# Patient Record
Sex: MALE | Race: OTHER | HISPANIC OR LATINO
[De-identification: names, ages, dates, MRNs, and addresses within clinical notes are randomized per-mention and may not be internally consistent; named-entity substitution may affect disease eponyms.]

---

## 2019-01-23 ENCOUNTER — TRANSCRIPTION ENCOUNTER (OUTPATIENT)
Age: 54
End: 2019-01-23

## 2019-06-26 ENCOUNTER — TRANSCRIPTION ENCOUNTER (OUTPATIENT)
Age: 54
End: 2019-06-26

## 2019-07-04 ENCOUNTER — TRANSCRIPTION ENCOUNTER (OUTPATIENT)
Age: 54
End: 2019-07-04

## 2019-07-22 ENCOUNTER — TRANSCRIPTION ENCOUNTER (OUTPATIENT)
Age: 54
End: 2019-07-22

## 2020-03-20 ENCOUNTER — INPATIENT (INPATIENT)
Facility: HOSPITAL | Age: 55
LOS: 4 days | Discharge: HOME | End: 2020-03-25
Attending: INTERNAL MEDICINE | Admitting: INTERNAL MEDICINE
Payer: MEDICAID

## 2020-03-20 VITALS
OXYGEN SATURATION: 100 % | WEIGHT: 130.07 LBS | RESPIRATION RATE: 16 BRPM | DIASTOLIC BLOOD PRESSURE: 87 MMHG | HEART RATE: 103 BPM | SYSTOLIC BLOOD PRESSURE: 122 MMHG | TEMPERATURE: 99 F

## 2020-03-20 LAB
ALBUMIN SERPL ELPH-MCNC: 3.2 G/DL — LOW (ref 3.3–5)
ALBUMIN SERPL ELPH-MCNC: 3.5 G/DL — SIGNIFICANT CHANGE UP (ref 3.3–5)
ALP SERPL-CCNC: 47 U/L — SIGNIFICANT CHANGE UP (ref 40–120)
ALP SERPL-CCNC: 51 U/L — SIGNIFICANT CHANGE UP (ref 40–120)
ALT FLD-CCNC: 68 U/L — HIGH (ref 4–41)
ALT FLD-CCNC: 76 U/L — HIGH (ref 4–41)
ANION GAP SERPL CALC-SCNC: 10 MMO/L — SIGNIFICANT CHANGE UP (ref 7–14)
ANION GAP SERPL CALC-SCNC: 11 MMO/L — SIGNIFICANT CHANGE UP (ref 7–14)
APPEARANCE UR: CLEAR — SIGNIFICANT CHANGE UP
APTT BLD: 25.9 SEC — LOW (ref 27.5–36.3)
AST SERPL-CCNC: 250 U/L — HIGH (ref 4–40)
AST SERPL-CCNC: 297 U/L — HIGH (ref 4–40)
B PERT DNA SPEC QL NAA+PROBE: NOT DETECTED — SIGNIFICANT CHANGE UP
BACTERIA # UR AUTO: NEGATIVE — SIGNIFICANT CHANGE UP
BASE EXCESS BLDV CALC-SCNC: 3.8 MMOL/L — SIGNIFICANT CHANGE UP
BASOPHILS # BLD AUTO: 0.01 K/UL — SIGNIFICANT CHANGE UP (ref 0–0.2)
BASOPHILS NFR BLD AUTO: 0.2 % — SIGNIFICANT CHANGE UP (ref 0–2)
BILIRUB SERPL-MCNC: 0.2 MG/DL — SIGNIFICANT CHANGE UP (ref 0.2–1.2)
BILIRUB SERPL-MCNC: 0.3 MG/DL — SIGNIFICANT CHANGE UP (ref 0.2–1.2)
BILIRUB UR-MCNC: NEGATIVE — SIGNIFICANT CHANGE UP
BLOOD GAS VENOUS - CREATININE: 0.88 MG/DL — SIGNIFICANT CHANGE UP (ref 0.5–1.3)
BLOOD GAS VENOUS - FIO2: 21 — SIGNIFICANT CHANGE UP
BLOOD UR QL VISUAL: NEGATIVE — SIGNIFICANT CHANGE UP
BUN SERPL-MCNC: 8 MG/DL — SIGNIFICANT CHANGE UP (ref 7–23)
BUN SERPL-MCNC: 9 MG/DL — SIGNIFICANT CHANGE UP (ref 7–23)
C PNEUM DNA SPEC QL NAA+PROBE: NOT DETECTED — SIGNIFICANT CHANGE UP
CALCIUM SERPL-MCNC: 8 MG/DL — LOW (ref 8.4–10.5)
CALCIUM SERPL-MCNC: 8.6 MG/DL — SIGNIFICANT CHANGE UP (ref 8.4–10.5)
CHLORIDE BLDV-SCNC: 103 MMOL/L — SIGNIFICANT CHANGE UP (ref 96–108)
CHLORIDE SERPL-SCNC: 102 MMOL/L — SIGNIFICANT CHANGE UP (ref 98–107)
CHLORIDE SERPL-SCNC: 98 MMOL/L — SIGNIFICANT CHANGE UP (ref 98–107)
CO2 SERPL-SCNC: 23 MMOL/L — SIGNIFICANT CHANGE UP (ref 22–31)
CO2 SERPL-SCNC: 27 MMOL/L — SIGNIFICANT CHANGE UP (ref 22–31)
COLOR SPEC: SIGNIFICANT CHANGE UP
CREAT SERPL-MCNC: 0.71 MG/DL — SIGNIFICANT CHANGE UP (ref 0.5–1.3)
CREAT SERPL-MCNC: 0.72 MG/DL — SIGNIFICANT CHANGE UP (ref 0.5–1.3)
EOSINOPHIL # BLD AUTO: 0 K/UL — SIGNIFICANT CHANGE UP (ref 0–0.5)
EOSINOPHIL NFR BLD AUTO: 0 % — SIGNIFICANT CHANGE UP (ref 0–6)
FLUAV H1 2009 PAND RNA SPEC QL NAA+PROBE: NOT DETECTED — SIGNIFICANT CHANGE UP
FLUAV H1 RNA SPEC QL NAA+PROBE: NOT DETECTED — SIGNIFICANT CHANGE UP
FLUAV H3 RNA SPEC QL NAA+PROBE: NOT DETECTED — SIGNIFICANT CHANGE UP
FLUAV SUBTYP SPEC NAA+PROBE: NOT DETECTED — SIGNIFICANT CHANGE UP
FLUBV RNA SPEC QL NAA+PROBE: NOT DETECTED — SIGNIFICANT CHANGE UP
GAS PNL BLDV: 136 MMOL/L — SIGNIFICANT CHANGE UP (ref 136–146)
GLUCOSE BLDV-MCNC: 100 MG/DL — HIGH (ref 70–99)
GLUCOSE SERPL-MCNC: 119 MG/DL — HIGH (ref 70–99)
GLUCOSE SERPL-MCNC: 84 MG/DL — SIGNIFICANT CHANGE UP (ref 70–99)
GLUCOSE UR-MCNC: NEGATIVE — SIGNIFICANT CHANGE UP
HADV DNA SPEC QL NAA+PROBE: NOT DETECTED — SIGNIFICANT CHANGE UP
HCO3 BLDV-SCNC: 27 MMOL/L — SIGNIFICANT CHANGE UP (ref 20–27)
HCOV PNL SPEC NAA+PROBE: SIGNIFICANT CHANGE UP
HCT VFR BLD CALC: 41.3 % — SIGNIFICANT CHANGE UP (ref 39–50)
HCT VFR BLDV CALC: 41.8 % — SIGNIFICANT CHANGE UP (ref 39–51)
HGB BLD-MCNC: 14 G/DL — SIGNIFICANT CHANGE UP (ref 13–17)
HGB BLDV-MCNC: 13.6 G/DL — SIGNIFICANT CHANGE UP (ref 13–17)
HMPV RNA SPEC QL NAA+PROBE: NOT DETECTED — SIGNIFICANT CHANGE UP
HPIV1 RNA SPEC QL NAA+PROBE: NOT DETECTED — SIGNIFICANT CHANGE UP
HPIV2 RNA SPEC QL NAA+PROBE: NOT DETECTED — SIGNIFICANT CHANGE UP
HPIV3 RNA SPEC QL NAA+PROBE: NOT DETECTED — SIGNIFICANT CHANGE UP
HPIV4 RNA SPEC QL NAA+PROBE: NOT DETECTED — SIGNIFICANT CHANGE UP
HYALINE CASTS # UR AUTO: NEGATIVE — SIGNIFICANT CHANGE UP
IMM GRANULOCYTES NFR BLD AUTO: 0.2 % — SIGNIFICANT CHANGE UP (ref 0–1.5)
INR BLD: 0.99 — SIGNIFICANT CHANGE UP (ref 0.88–1.17)
KETONES UR-MCNC: HIGH
LACTATE BLDV-MCNC: 1.1 MMOL/L — SIGNIFICANT CHANGE UP (ref 0.5–2)
LEUKOCYTE ESTERASE UR-ACNC: NEGATIVE — SIGNIFICANT CHANGE UP
LYMPHOCYTES # BLD AUTO: 0.98 K/UL — LOW (ref 1–3.3)
LYMPHOCYTES # BLD AUTO: 22.1 % — SIGNIFICANT CHANGE UP (ref 13–44)
MANUAL SMEAR VERIFICATION: SIGNIFICANT CHANGE UP
MCHC RBC-ENTMCNC: 30.4 PG — SIGNIFICANT CHANGE UP (ref 27–34)
MCHC RBC-ENTMCNC: 33.9 % — SIGNIFICANT CHANGE UP (ref 32–36)
MCV RBC AUTO: 89.8 FL — SIGNIFICANT CHANGE UP (ref 80–100)
MONOCYTES # BLD AUTO: 0.23 K/UL — SIGNIFICANT CHANGE UP (ref 0–0.9)
MONOCYTES NFR BLD AUTO: 5.2 % — SIGNIFICANT CHANGE UP (ref 2–14)
MORPHOLOGY BLD-IMP: SIGNIFICANT CHANGE UP
NEUTROPHILS # BLD AUTO: 3.21 K/UL — SIGNIFICANT CHANGE UP (ref 1.8–7.4)
NEUTROPHILS NFR BLD AUTO: 72.3 % — SIGNIFICANT CHANGE UP (ref 43–77)
NITRITE UR-MCNC: NEGATIVE — SIGNIFICANT CHANGE UP
NRBC # FLD: 0 K/UL — SIGNIFICANT CHANGE UP (ref 0–0)
PCO2 BLDV: 47 MMHG — SIGNIFICANT CHANGE UP (ref 41–51)
PH BLDV: 7.4 PH — SIGNIFICANT CHANGE UP (ref 7.32–7.43)
PH UR: 7 — SIGNIFICANT CHANGE UP (ref 5–8)
PLATELET # BLD AUTO: 75 K/UL — LOW (ref 150–400)
PLATELET COUNT - ESTIMATE: SIGNIFICANT CHANGE UP
PMV BLD: 11.8 FL — SIGNIFICANT CHANGE UP (ref 7–13)
PO2 BLDV: 37 MMHG — SIGNIFICANT CHANGE UP (ref 35–40)
POTASSIUM BLDV-SCNC: 3.7 MMOL/L — SIGNIFICANT CHANGE UP (ref 3.4–4.5)
POTASSIUM SERPL-MCNC: 3.7 MMOL/L — SIGNIFICANT CHANGE UP (ref 3.5–5.3)
POTASSIUM SERPL-MCNC: 4.6 MMOL/L — SIGNIFICANT CHANGE UP (ref 3.5–5.3)
POTASSIUM SERPL-SCNC: 3.7 MMOL/L — SIGNIFICANT CHANGE UP (ref 3.5–5.3)
POTASSIUM SERPL-SCNC: 4.6 MMOL/L — SIGNIFICANT CHANGE UP (ref 3.5–5.3)
PROT SERPL-MCNC: 6.3 G/DL — SIGNIFICANT CHANGE UP (ref 6–8.3)
PROT SERPL-MCNC: 6.8 G/DL — SIGNIFICANT CHANGE UP (ref 6–8.3)
PROT UR-MCNC: 20 — SIGNIFICANT CHANGE UP
PROTHROM AB SERPL-ACNC: 11.4 SEC — SIGNIFICANT CHANGE UP (ref 9.8–13.1)
RBC # BLD: 4.6 M/UL — SIGNIFICANT CHANGE UP (ref 4.2–5.8)
RBC # FLD: 12.4 % — SIGNIFICANT CHANGE UP (ref 10.3–14.5)
RBC CASTS # UR COMP ASSIST: SIGNIFICANT CHANGE UP (ref 0–?)
RSV RNA SPEC QL NAA+PROBE: NOT DETECTED — SIGNIFICANT CHANGE UP
RV+EV RNA SPEC QL NAA+PROBE: NOT DETECTED — SIGNIFICANT CHANGE UP
SAO2 % BLDV: 67.2 % — SIGNIFICANT CHANGE UP (ref 60–85)
SODIUM SERPL-SCNC: 135 MMOL/L — SIGNIFICANT CHANGE UP (ref 135–145)
SODIUM SERPL-SCNC: 136 MMOL/L — SIGNIFICANT CHANGE UP (ref 135–145)
SP GR SPEC: 1.01 — SIGNIFICANT CHANGE UP (ref 1–1.04)
SQUAMOUS # UR AUTO: SIGNIFICANT CHANGE UP
UROBILINOGEN FLD QL: NORMAL — SIGNIFICANT CHANGE UP
WBC # BLD: 4.44 K/UL — SIGNIFICANT CHANGE UP (ref 3.8–10.5)
WBC # FLD AUTO: 4.44 K/UL — SIGNIFICANT CHANGE UP (ref 3.8–10.5)
WBC UR QL: SIGNIFICANT CHANGE UP (ref 0–?)

## 2020-03-20 PROCEDURE — 71045 X-RAY EXAM CHEST 1 VIEW: CPT | Mod: 26

## 2020-03-20 RX ORDER — SODIUM CHLORIDE 9 MG/ML
1000 INJECTION INTRAMUSCULAR; INTRAVENOUS; SUBCUTANEOUS ONCE
Refills: 0 | Status: COMPLETED | OUTPATIENT
Start: 2020-03-20 | End: 2020-03-20

## 2020-03-20 RX ADMIN — SODIUM CHLORIDE 1000 MILLILITER(S): 9 INJECTION INTRAMUSCULAR; INTRAVENOUS; SUBCUTANEOUS at 19:37

## 2020-03-20 RX ADMIN — SODIUM CHLORIDE 1000 MILLILITER(S): 9 INJECTION INTRAMUSCULAR; INTRAVENOUS; SUBCUTANEOUS at 17:40

## 2020-03-20 NOTE — ED ADULT NURSE NOTE - INTERVENTIONS DEFINITIONS
Room bathroom lighting operational/Provide visual clues: red socks/Review medications for side effects contributing to fall risk

## 2020-03-20 NOTE — ED ADULT TRIAGE NOTE - CHIEF COMPLAINT QUOTE
from [facility] - group home with aide- fever (100) x 4 days- no other complaints. pt nonverbal= baseline

## 2020-03-20 NOTE — ED PROVIDER NOTE - PHYSICAL EXAMINATION
Const: Well-nourished, Well-developed, appearing stated age.  Eyes: no conjunctival injection, and symmetrical lids.  HEENT: Head NCAT, no lesions. Atraumatic external nose and ears + Dry MM  Neck: Symmetric, trachea midline,  CVS: +S1/S2, Peripheral pulses 2+ and equal in all extremities.  RESP: Unlabored respiratory effort. Clear to auscultation bilaterally.  GI: Nontender/Nondistended, No hepatosplenomegaly.  MSK: Normocephalic/Atraumatic, Extremities w/o deformity or ttp.   Skin: Warm, dry and intact. No rashes or lesions.  Psych:  Appropriate mood and affect.

## 2020-03-20 NOTE — ED PROVIDER NOTE - ATTENDING CONTRIBUTION TO CARE
54M, MR (non verbal at baseline) p/w fevers x5 days. Pper nursing home nurse, Tm 102 and has been getting tylenol daily for fevers. No reported cough, abd pain, vomiting/diarrhea. No foul smelling urine or urinary frequency. Pt has been at baseline per staff though did note decreased appetite x2 days. No known sick contacts in nursing home. Pt has been isolated from other members due to fevers.  VS reviewed - tachycardic and afebrile here (last dose tylenol last night)  Gen: nad, grunting which is baseline per nursing home staff  CV: tachy, regular  Pulm: clear lungs  Abd: soft, nt, nd  Ext: no edema, no rash  MDM: 54M, MR (non verbal at baseline) p/w fevers x5 days without any other symptoms- unclear source of fevers at time time, as such will do infectious workup including labs, blood and urine cultures, UA, RVP and covid given residence in group home, CXR. Dispo pending w/u

## 2020-03-20 NOTE — ED PROVIDER NOTE - OBJECTIVE STATEMENT
54M, MR (non verbal at baseline) no pmhx presenting with CC of fevers x 5days (per nursing home nurse). No complaints of fowl urine, nasal congestion, cough, diarrhea. Other than the fevers, patient has been at his baseline. Fevers have mostly been in the evening with Tmax of 102F, given tylenol. Last dose was yesterday evening. No known sick contacts in nursing home.     [phone], Nurse Essie

## 2020-03-20 NOTE — ED CLERICAL - NS ED CLERK NOTE PRE-ARRIVAL INFORMATION; ADDITIONAL PRE-ARRIVAL INFORMATION
Pt being sent in for fever x 4 days sent in for R/O aspiration PNA pt has difficulty swallowing as per RN.

## 2020-03-20 NOTE — ED ADULT NURSE REASSESSMENT NOTE - NS ED NURSE REASSESS COMMENT FT1
pt straight cathed for urine using sterile technique. Pt noted to have 700 cc of dark yellow urine noted. repeat labs drawn and sent. pt resting comfortably in bed. NAD. awaiting further orders will continue to monitor.

## 2020-03-20 NOTE — ED PROVIDER NOTE - NS ED ROS FT
Obtained from Nurse Essie    CONST: + fevers, + chills, no trauma  ENT: no rhinorrhea  CV: no extremity pain or swelling  RESP: no shortness of breath, no cough, no sputum, no pleurisy, no wheezing  ABD: no abdominal pain, no vomiting, no diarrhea  : no dysuria, no hematuria, no frequency, no urgency  NEURO: no focal weakness  HEME: no easy bleeding or bruising  SKIN: no diaphoresis, no rash

## 2020-03-20 NOTE — ED ADULT NURSE NOTE - CHIEF COMPLAINT QUOTE
from [facility] - group home with aide- fever (100) x 4 days- no other complaints. pt nonverbal= baseline

## 2020-03-20 NOTE — ED PROVIDER NOTE - CLINICAL SUMMARY MEDICAL DECISION MAKING FREE TEXT BOX
54M hx of MR presenting for fevers PE: tachycardic to 103, otherwise unremarkable exam Ddx: Viral illness 2/2 to flu vs COVID vs other Plan: Septic work up, labs, cxr, urine, fluids

## 2020-03-21 DIAGNOSIS — Z02.9 ENCOUNTER FOR ADMINISTRATIVE EXAMINATIONS, UNSPECIFIED: ICD-10-CM

## 2020-03-21 DIAGNOSIS — R62.50 UNSPECIFIED LACK OF EXPECTED NORMAL PHYSIOLOGICAL DEVELOPMENT IN CHILDHOOD: ICD-10-CM

## 2020-03-21 DIAGNOSIS — B34.9 VIRAL INFECTION, UNSPECIFIED: ICD-10-CM

## 2020-03-21 DIAGNOSIS — D69.6 THROMBOCYTOPENIA, UNSPECIFIED: ICD-10-CM

## 2020-03-21 LAB
ALBUMIN SERPL ELPH-MCNC: 3.4 G/DL — SIGNIFICANT CHANGE UP (ref 3.3–5)
ALP SERPL-CCNC: 53 U/L — SIGNIFICANT CHANGE UP (ref 40–120)
ALT FLD-CCNC: 72 U/L — HIGH (ref 4–41)
ANION GAP SERPL CALC-SCNC: 11 MMO/L — SIGNIFICANT CHANGE UP (ref 7–14)
AST SERPL-CCNC: 235 U/L — HIGH (ref 4–40)
BASOPHILS # BLD AUTO: 0 K/UL — SIGNIFICANT CHANGE UP (ref 0–0.2)
BASOPHILS NFR BLD AUTO: 0 % — SIGNIFICANT CHANGE UP (ref 0–2)
BILIRUB SERPL-MCNC: 0.3 MG/DL — SIGNIFICANT CHANGE UP (ref 0.2–1.2)
BUN SERPL-MCNC: 10 MG/DL — SIGNIFICANT CHANGE UP (ref 7–23)
CALCIUM SERPL-MCNC: 8.3 MG/DL — LOW (ref 8.4–10.5)
CHLORIDE SERPL-SCNC: 101 MMOL/L — SIGNIFICANT CHANGE UP (ref 98–107)
CO2 SERPL-SCNC: 24 MMOL/L — SIGNIFICANT CHANGE UP (ref 22–31)
CREAT SERPL-MCNC: 0.79 MG/DL — SIGNIFICANT CHANGE UP (ref 0.5–1.3)
D DIMER BLD IA.RAPID-MCNC: 263 NG/ML — SIGNIFICANT CHANGE UP
EOSINOPHIL # BLD AUTO: 0.01 K/UL — SIGNIFICANT CHANGE UP (ref 0–0.5)
EOSINOPHIL NFR BLD AUTO: 0.2 % — SIGNIFICANT CHANGE UP (ref 0–6)
FIBRINOGEN PPP-MCNC: 553 MG/DL — HIGH (ref 300–520)
GLUCOSE SERPL-MCNC: 75 MG/DL — SIGNIFICANT CHANGE UP (ref 70–99)
HCT VFR BLD CALC: 41.9 % — SIGNIFICANT CHANGE UP (ref 39–50)
HGB BLD-MCNC: 13.9 G/DL — SIGNIFICANT CHANGE UP (ref 13–17)
IMM GRANULOCYTES NFR BLD AUTO: 0.4 % — SIGNIFICANT CHANGE UP (ref 0–1.5)
LYMPHOCYTES # BLD AUTO: 1.19 K/UL — SIGNIFICANT CHANGE UP (ref 1–3.3)
LYMPHOCYTES # BLD AUTO: 22.1 % — SIGNIFICANT CHANGE UP (ref 13–44)
MAGNESIUM SERPL-MCNC: 2.4 MG/DL — SIGNIFICANT CHANGE UP (ref 1.6–2.6)
MCHC RBC-ENTMCNC: 30.3 PG — SIGNIFICANT CHANGE UP (ref 27–34)
MCHC RBC-ENTMCNC: 33.2 % — SIGNIFICANT CHANGE UP (ref 32–36)
MCV RBC AUTO: 91.3 FL — SIGNIFICANT CHANGE UP (ref 80–100)
MONOCYTES # BLD AUTO: 0.25 K/UL — SIGNIFICANT CHANGE UP (ref 0–0.9)
MONOCYTES NFR BLD AUTO: 4.6 % — SIGNIFICANT CHANGE UP (ref 2–14)
NEUTROPHILS # BLD AUTO: 3.92 K/UL — SIGNIFICANT CHANGE UP (ref 1.8–7.4)
NEUTROPHILS NFR BLD AUTO: 72.7 % — SIGNIFICANT CHANGE UP (ref 43–77)
NRBC # FLD: 0 K/UL — SIGNIFICANT CHANGE UP (ref 0–0)
PHOSPHATE SERPL-MCNC: 2.9 MG/DL — SIGNIFICANT CHANGE UP (ref 2.5–4.5)
PLATELET # BLD AUTO: 84 K/UL — LOW (ref 150–400)
PMV BLD: 12.3 FL — SIGNIFICANT CHANGE UP (ref 7–13)
POTASSIUM SERPL-MCNC: 3.8 MMOL/L — SIGNIFICANT CHANGE UP (ref 3.5–5.3)
POTASSIUM SERPL-SCNC: 3.8 MMOL/L — SIGNIFICANT CHANGE UP (ref 3.5–5.3)
PROT SERPL-MCNC: 6.7 G/DL — SIGNIFICANT CHANGE UP (ref 6–8.3)
RBC # BLD: 4.59 M/UL — SIGNIFICANT CHANGE UP (ref 4.2–5.8)
RBC # FLD: 12.8 % — SIGNIFICANT CHANGE UP (ref 10.3–14.5)
SODIUM SERPL-SCNC: 136 MMOL/L — SIGNIFICANT CHANGE UP (ref 135–145)
WBC # BLD: 5.39 K/UL — SIGNIFICANT CHANGE UP (ref 3.8–10.5)
WBC # FLD AUTO: 5.39 K/UL — SIGNIFICANT CHANGE UP (ref 3.8–10.5)

## 2020-03-21 PROCEDURE — 99223 1ST HOSP IP/OBS HIGH 75: CPT

## 2020-03-21 PROCEDURE — 12345: CPT | Mod: NC

## 2020-03-21 RX ORDER — TAMSULOSIN HYDROCHLORIDE 0.4 MG/1
0.4 CAPSULE ORAL AT BEDTIME
Refills: 0 | Status: DISCONTINUED | OUTPATIENT
Start: 2020-03-21 | End: 2020-03-25

## 2020-03-21 RX ORDER — KETOTIFEN FUMARATE 0.34 MG/ML
1 SOLUTION OPHTHALMIC
Refills: 0 | Status: DISCONTINUED | OUTPATIENT
Start: 2020-03-21 | End: 2020-03-25

## 2020-03-21 RX ORDER — ACETAMINOPHEN 500 MG
650 TABLET ORAL EVERY 6 HOURS
Refills: 0 | Status: DISCONTINUED | OUTPATIENT
Start: 2020-03-21 | End: 2020-03-25

## 2020-03-21 RX ORDER — BENZTROPINE MESYLATE 1 MG
0.5 TABLET ORAL
Refills: 0 | Status: DISCONTINUED | OUTPATIENT
Start: 2020-03-21 | End: 2020-03-25

## 2020-03-21 RX ORDER — CHLORPROMAZINE HCL 10 MG
25 TABLET ORAL DAILY
Refills: 0 | Status: DISCONTINUED | OUTPATIENT
Start: 2020-03-21 | End: 2020-03-25

## 2020-03-21 RX ORDER — CHLORPROMAZINE HCL 10 MG
100 TABLET ORAL AT BEDTIME
Refills: 0 | Status: DISCONTINUED | OUTPATIENT
Start: 2020-03-21 | End: 2020-03-25

## 2020-03-21 RX ORDER — POLYETHYLENE GLYCOL 3350 17 G/17G
17 POWDER, FOR SOLUTION ORAL DAILY
Refills: 0 | Status: DISCONTINUED | OUTPATIENT
Start: 2020-03-21 | End: 2020-03-25

## 2020-03-21 RX ADMIN — KETOTIFEN FUMARATE 1 DROP(S): 0.34 SOLUTION OPHTHALMIC at 17:16

## 2020-03-21 RX ADMIN — Medication 25 MILLIGRAM(S): at 11:46

## 2020-03-21 RX ADMIN — Medication 0.5 MILLIGRAM(S): at 07:55

## 2020-03-21 RX ADMIN — Medication 100 MILLIGRAM(S): at 21:51

## 2020-03-21 RX ADMIN — Medication 0.5 MILLIGRAM(S): at 17:16

## 2020-03-21 RX ADMIN — Medication 1 TABLET(S): at 11:46

## 2020-03-21 RX ADMIN — TAMSULOSIN HYDROCHLORIDE 0.4 MILLIGRAM(S): 0.4 CAPSULE ORAL at 21:51

## 2020-03-21 RX ADMIN — KETOTIFEN FUMARATE 1 DROP(S): 0.34 SOLUTION OPHTHALMIC at 11:46

## 2020-03-21 NOTE — PROGRESS NOTE ADULT - PROBLEM SELECTOR PLAN 4
If pt remains clinically stable without O2 requirements, can consider d/c home tomorrow or the day after.

## 2020-03-21 NOTE — H&P ADULT - PROBLEM SELECTOR PLAN 2
- continue chlorpromazine Unclear cause.  No baseline level available  - will recheck in am, and check d dimer and fibrinogen

## 2020-03-21 NOTE — H&P ADULT - NSHPLABSRESULTS_GEN_ALL_CORE
136  |  102  |  8   ----------------------------<  84  3.7   |  23  |  0.71    Ca    8.0<L>      20 Mar 2020 22:28    TPro  6.3  /  Alb  3.2<L>  /  TBili  0.2  /  DBili  x   /  AST  250<H>  /  ALT  68<H>  /  AlkPhos  47                              14.0   4.44  )-----------( 75       ( 20 Mar 2020 16:55 )             41.3             PT/INR - ( 20 Mar 2020 16:55 )   PT: 11.4 SEC;   INR: 0.99          PTT - ( 20 Mar 2020 16:55 )  PTT:25.9 SEC    Urinalysis Basic - ( 20 Mar 2020 22:28 )    Color: LIGHT YELLOW / Appearance: CLEAR / S.012 / pH: 7.0  Gluc: NEGATIVE / Ketone: MODERATE  / Bili: NEGATIVE / Urobili: NORMAL   Blood: NEGATIVE / Protein: 20 / Nitrite: NEGATIVE   Leuk Esterase: NEGATIVE / RBC: 0-2 / WBC 0-2   Sq Epi: OCC / Non Sq Epi: x / Bacteria: NEGATIVE    cxr film reviewed: Clear lungs

## 2020-03-21 NOTE — PROVIDER CONTACT NOTE (OTHER) - ASSESSMENT
pt nonverbal, has MR, A&OX0, incontinent x2. ate breakfast and lunch, able to swallow without difficulty. VS stable, room air, not in any acute distress.

## 2020-03-21 NOTE — PROGRESS NOTE ADULT - SUBJECTIVE AND OBJECTIVE BOX
Patient is a 54y old  Male who presents with a chief complaint of fever (21 Mar 2020 02:01)        SUBJECTIVE / OVERNIGHT EVENTS:      MEDICATIONS  (STANDING):  benztropine 0.5 milliGRAM(s) Oral two times a day  chlorproMAZINE    Tablet 100 milliGRAM(s) Oral at bedtime  chlorproMAZINE    Tablet 25 milliGRAM(s) Oral daily  ketotifen 0.025% Ophthalmic Solution 1 Drop(s) Both EYES two times a day  multivitamin 1 Tablet(s) Oral daily  tamsulosin 0.4 milliGRAM(s) Oral at bedtime    MEDICATIONS  (PRN):  acetaminophen   Tablet .. 650 milliGRAM(s) Oral every 6 hours PRN Temp greater or equal to 38C (100.4F)  polyethylene glycol 3350 17 Gram(s) Oral daily PRN Constipation      Vital Signs Last 24 Hrs  T(C): 36.6 (21 Mar 2020 15:40), Max: 36.9 (21 Mar 2020 11:44)  T(F): 97.9 (21 Mar 2020 15:40), Max: 98.4 (21 Mar 2020 11:44)  HR: 84 (21 Mar 2020 15:40) (84 - 95)  BP: 119/80 (21 Mar 2020 15:40) (111/70 - 119/80)  BP(mean): --  RR: 20 (21 Mar 2020 15:40) (17 - 20)  SpO2: 99% (21 Mar 2020 15:40) (98% - 100%)  CAPILLARY BLOOD GLUCOSE        I&O's Summary        PHYSICAL EXAM  GENERAL: NAD, well-developed  HEAD:  Atraumatic, Normocephalic  EYES: EOMI, PERRLA, conjunctiva and sclera clear  NECK: Supple, No JVD  CHEST/LUNG: Clear to auscultation bilaterally; No wheeze  HEART: Regular rate and rhythm; No murmurs, rubs, or gallops  ABDOMEN: Soft, Nontender, Nondistended; Bowel sounds present  EXTREMITIES:  2+ Peripheral Pulses, No clubbing, cyanosis, or edema  PSYCH: AAOx3  SKIN: No rashes or lesions    LABS:                        13.9   5.39  )-----------( 84       ( 21 Mar 2020 07:05 )             41.9     03-21    136  |  101  |  10  ----------------------------<  75  3.8   |  24  |  0.79    Ca    8.3<L>      21 Mar 2020 07:05  Phos  2.9     -  Mg     2.4         TPro  6.7  /  Alb  3.4  /  TBili  0.3  /  DBili  x   /  AST  235<H>  /  ALT  72<H>  /  AlkPhos  53  03-21    PT/INR - ( 20 Mar 2020 16:55 )   PT: 11.4 SEC;   INR: 0.99          PTT - ( 20 Mar 2020 16:55 )  PTT:25.9 SEC      Urinalysis Basic - ( 20 Mar 2020 22:28 )    Color: LIGHT YELLOW / Appearance: CLEAR / S.012 / pH: 7.0  Gluc: NEGATIVE / Ketone: MODERATE  / Bili: NEGATIVE / Urobili: NORMAL   Blood: NEGATIVE / Protein: 20 / Nitrite: NEGATIVE   Leuk Esterase: NEGATIVE / RBC: 0-2 / WBC 0-2   Sq Epi: OCC / Non Sq Epi: x / Bacteria: NEGATIVE        RADIOLOGY & ADDITIONAL TESTS:    Imaging Personally Reviewed:  Consultant(s) Notes Reviewed:    Care Discussed with Consultants/Other Providers: Patient is a 54y old  Male who presents with a chief complaint of fever (21 Mar 2020 02:01)    SUBJECTIVE / OVERNIGHT EVENTS:  Unable to obtain ROS. Patient non-verbal and not following commands. No active issues as per patient's day nurse.     MEDICATIONS  (STANDING):  benztropine 0.5 milliGRAM(s) Oral two times a day  chlorproMAZINE    Tablet 100 milliGRAM(s) Oral at bedtime  chlorproMAZINE    Tablet 25 milliGRAM(s) Oral daily  ketotifen 0.025% Ophthalmic Solution 1 Drop(s) Both EYES two times a day  multivitamin 1 Tablet(s) Oral daily  tamsulosin 0.4 milliGRAM(s) Oral at bedtime    MEDICATIONS  (PRN):  acetaminophen   Tablet .. 650 milliGRAM(s) Oral every 6 hours PRN Temp greater or equal to 38C (100.4F)  polyethylene glycol 3350 17 Gram(s) Oral daily PRN Constipation      Vital Signs Last 24 Hrs  T(C): 36.6 (21 Mar 2020 15:40), Max: 36.9 (21 Mar 2020 11:44)  T(F): 97.9 (21 Mar 2020 15:40), Max: 98.4 (21 Mar 2020 11:44)  HR: 84 (21 Mar 2020 15:40) (84 - 95)  BP: 119/80 (21 Mar 2020 15:40) (111/70 - 119/80)  BP(mean): --  RR: 20 (21 Mar 2020 15:40) (17 - 20)  SpO2: 99% (21 Mar 2020 15:40) (98% - 100%)        PHYSICAL EXAM  GENERAL: NAD  CHEST/LUNG: Normal respiratory effort, no use of accessory muscles.   HEART: Regular rate and rhythm;  ABDOMEN: Soft, Nontender, Nondistended; Bowel sounds present  EXTREMITIES:  2+ Peripheral Pulses, No clubbing, cyanosis, or edema  PSYCH: AAOx3        LABS:                        13.9   5.39  )-----------( 84       ( 21 Mar 2020 07:05 )             41.9     03-21    136  |  101  |  10  ----------------------------<  75  3.8   |  24  |  0.79    Ca    8.3<L>      21 Mar 2020 07:05  Phos  2.9     03-21  Mg     2.4     03-21    TPro  6.7  /  Alb  3.4  /  TBili  0.3  /  DBili  x   /  AST  235<H>  /  ALT  72<H>  /  AlkPhos  53  03-21

## 2020-03-21 NOTE — H&P ADULT - HISTORY OF PRESENT ILLNESS
53 y/o M with MR (non verbal at baseline) 2M, MR p/w fever for the past 5 days.  Pt had temp as high as 102F today.  Aide at bedside also reports that pt has had cough.  No abdominal pain noted.  No vomiting or diarrhea.  Per aide, no other known sick contacts in group home.      In the ED, pt was tachycardic to 103.  He was given 2L NS. 55 y/o M with MR (non verbal at baseline), autism p/w fever for the past 5 days.  Pt had temp as high as 102F today.  Aide at bedside also reports that pt has had cough.  No abdominal pain noted.  No vomiting or diarrhea.  Per aide, no other known sick contacts in group home.      In the ED, pt was tachycardic to 103.  He was given 2L NS.

## 2020-03-21 NOTE — PROVIDER CONTACT NOTE (OTHER) - ASSESSMENT
pt nonverbal, unable to make his needs known, a&ox0 and MR.  pt voided once throughout shift (incontinent count) and put out 175ml post straight catheterization.

## 2020-03-22 LAB
ANION GAP SERPL CALC-SCNC: 12 MMO/L — SIGNIFICANT CHANGE UP (ref 7–14)
BASOPHILS # BLD AUTO: 0.01 K/UL — SIGNIFICANT CHANGE UP (ref 0–0.2)
BASOPHILS NFR BLD AUTO: 0.3 % — SIGNIFICANT CHANGE UP (ref 0–2)
BUN SERPL-MCNC: 11 MG/DL — SIGNIFICANT CHANGE UP (ref 7–23)
CALCIUM SERPL-MCNC: 8.7 MG/DL — SIGNIFICANT CHANGE UP (ref 8.4–10.5)
CHLORIDE SERPL-SCNC: 102 MMOL/L — SIGNIFICANT CHANGE UP (ref 98–107)
CO2 SERPL-SCNC: 24 MMOL/L — SIGNIFICANT CHANGE UP (ref 22–31)
CREAT SERPL-MCNC: 0.69 MG/DL — SIGNIFICANT CHANGE UP (ref 0.5–1.3)
CULTURE RESULTS: SIGNIFICANT CHANGE UP
EOSINOPHIL # BLD AUTO: 0.02 K/UL — SIGNIFICANT CHANGE UP (ref 0–0.5)
EOSINOPHIL NFR BLD AUTO: 0.5 % — SIGNIFICANT CHANGE UP (ref 0–6)
GLUCOSE SERPL-MCNC: 92 MG/DL — SIGNIFICANT CHANGE UP (ref 70–99)
HCT VFR BLD CALC: 44.8 % — SIGNIFICANT CHANGE UP (ref 39–50)
HGB BLD-MCNC: 15.1 G/DL — SIGNIFICANT CHANGE UP (ref 13–17)
IMM GRANULOCYTES NFR BLD AUTO: 0.3 % — SIGNIFICANT CHANGE UP (ref 0–1.5)
LYMPHOCYTES # BLD AUTO: 1.31 K/UL — SIGNIFICANT CHANGE UP (ref 1–3.3)
LYMPHOCYTES # BLD AUTO: 35.6 % — SIGNIFICANT CHANGE UP (ref 13–44)
MAGNESIUM SERPL-MCNC: 2.3 MG/DL — SIGNIFICANT CHANGE UP (ref 1.6–2.6)
MCHC RBC-ENTMCNC: 30.8 PG — SIGNIFICANT CHANGE UP (ref 27–34)
MCHC RBC-ENTMCNC: 33.7 % — SIGNIFICANT CHANGE UP (ref 32–36)
MCV RBC AUTO: 91.4 FL — SIGNIFICANT CHANGE UP (ref 80–100)
MONOCYTES # BLD AUTO: 0.28 K/UL — SIGNIFICANT CHANGE UP (ref 0–0.9)
MONOCYTES NFR BLD AUTO: 7.6 % — SIGNIFICANT CHANGE UP (ref 2–14)
NEUTROPHILS # BLD AUTO: 2.05 K/UL — SIGNIFICANT CHANGE UP (ref 1.8–7.4)
NEUTROPHILS NFR BLD AUTO: 55.7 % — SIGNIFICANT CHANGE UP (ref 43–77)
NRBC # FLD: 0 K/UL — SIGNIFICANT CHANGE UP (ref 0–0)
PLATELET # BLD AUTO: 97 K/UL — LOW (ref 150–400)
PMV BLD: 11.7 FL — SIGNIFICANT CHANGE UP (ref 7–13)
POTASSIUM SERPL-MCNC: 4 MMOL/L — SIGNIFICANT CHANGE UP (ref 3.5–5.3)
POTASSIUM SERPL-SCNC: 4 MMOL/L — SIGNIFICANT CHANGE UP (ref 3.5–5.3)
RBC # BLD: 4.9 M/UL — SIGNIFICANT CHANGE UP (ref 4.2–5.8)
RBC # FLD: 12.7 % — SIGNIFICANT CHANGE UP (ref 10.3–14.5)
SARS-COV-2 RNA SPEC QL NAA+PROBE: DETECTED
SODIUM SERPL-SCNC: 138 MMOL/L — SIGNIFICANT CHANGE UP (ref 135–145)
SPECIMEN SOURCE: SIGNIFICANT CHANGE UP
WBC # BLD: 3.68 K/UL — LOW (ref 3.8–10.5)
WBC # FLD AUTO: 3.68 K/UL — LOW (ref 3.8–10.5)

## 2020-03-22 PROCEDURE — 99232 SBSQ HOSP IP/OBS MODERATE 35: CPT

## 2020-03-22 RX ADMIN — Medication 25 MILLIGRAM(S): at 12:07

## 2020-03-22 RX ADMIN — KETOTIFEN FUMARATE 1 DROP(S): 0.34 SOLUTION OPHTHALMIC at 06:11

## 2020-03-22 RX ADMIN — TAMSULOSIN HYDROCHLORIDE 0.4 MILLIGRAM(S): 0.4 CAPSULE ORAL at 21:53

## 2020-03-22 RX ADMIN — Medication 0.5 MILLIGRAM(S): at 18:32

## 2020-03-22 RX ADMIN — Medication 0.5 MILLIGRAM(S): at 06:11

## 2020-03-22 RX ADMIN — Medication 1 TABLET(S): at 12:07

## 2020-03-22 RX ADMIN — Medication 100 MILLIGRAM(S): at 21:53

## 2020-03-22 RX ADMIN — KETOTIFEN FUMARATE 1 DROP(S): 0.34 SOLUTION OPHTHALMIC at 18:24

## 2020-03-22 NOTE — PROGRESS NOTE ADULT - SUBJECTIVE AND OBJECTIVE BOX
PROGRESS NOTE:   Authored by Dr. Sugar Quevedo Pager [phone]    Patient is a 54y old  Male who presents with a chief complaint of fever (21 Mar 2020 17:38)      SUBJECTIVE / OVERNIGHT EVENTS: Patient non verbal at baseline however appears to be in good spirits - smiling and shook my hand.     ADDITIONAL REVIEW OF SYSTEMS:  Unable to obtain.    MEDICATIONS  (STANDING):  benztropine 0.5 milliGRAM(s) Oral two times a day  chlorproMAZINE    Tablet 100 milliGRAM(s) Oral at bedtime  chlorproMAZINE    Tablet 25 milliGRAM(s) Oral daily  ketotifen 0.025% Ophthalmic Solution 1 Drop(s) Both EYES two times a day  multivitamin 1 Tablet(s) Oral daily  tamsulosin 0.4 milliGRAM(s) Oral at bedtime    MEDICATIONS  (PRN):  acetaminophen   Tablet .. 650 milliGRAM(s) Oral every 6 hours PRN Temp greater or equal to 38C (100.4F)  polyethylene glycol 3350 17 Gram(s) Oral daily PRN Constipation      CAPILLARY BLOOD GLUCOSE        I&O's Summary    21 Mar 2020 07:01  -  22 Mar 2020 07:00  --------------------------------------------------------  IN: 0 mL / OUT: 175 mL / NET: -175 mL        PHYSICAL EXAM:  Vital Signs Last 24 Hrs  T(C): 36.7 (22 Mar 2020 12:03), Max: 36.8 (21 Mar 2020 19:12)  T(F): 98 (22 Mar 2020 12:03), Max: 98.2 (21 Mar 2020 19:12)  HR: 82 (22 Mar 2020 12:03) (80 - 90)  BP: 100/68 (22 Mar 2020 12:03) (100/61 - 119/80)  BP(mean): --  RR: 18 (22 Mar 2020 12:03) (17 - 20)  SpO2: 100% (22 Mar 2020 12:03) (98% - 100%)    CONSTITUTIONAL: NAD, well-developed  RESPIRATORY: Normal respiratory effort; lungs are clear to auscultation bilaterally  CARDIOVASCULAR: Regular rate and rhythm, normal S1 and S2, no murmur/rub/gallop; No lower extremity edema; Peripheral pulses are 2+ bilaterally  ABDOMEN: Nontender to palpation, normoactive bowel sounds, no rebound/guarding; No hepatosplenomegaly  MUSCLOSKELETAL: no clubbing or cyanosis of digits; no joint swelling or tenderness to palpation  PSYCH: unable to assess orientation.    LABS:                        15.1   3.68  )-----------( 97       ( 22 Mar 2020 06:05 )             44.8     03-22    138  |  102  |  11  ----------------------------<  92  4.0   |  24  |  0.69    Ca    8.7      22 Mar 2020 06:05  Phos  2.9     -21  Mg     2.3     22    TPro  6.7  /  Alb  3.4  /  TBili  0.3  /  DBili  x   /  AST  235<H>  /  ALT  72<H>  /  AlkPhos  53  03-21    PT/INR - ( 20 Mar 2020 16:55 )   PT: 11.4 SEC;   INR: 0.99          PTT - ( 20 Mar 2020 16:55 )  PTT:25.9 SEC      Urinalysis Basic - ( 20 Mar 2020 22:28 )    Color: LIGHT YELLOW / Appearance: CLEAR / S.012 / pH: 7.0  Gluc: NEGATIVE / Ketone: MODERATE  / Bili: NEGATIVE / Urobili: NORMAL   Blood: NEGATIVE / Protein: 20 / Nitrite: NEGATIVE   Leuk Esterase: NEGATIVE / RBC: 0-2 / WBC 0-2   Sq Epi: OCC / Non Sq Epi: x / Bacteria: NEGATIVE        Culture - Urine (collected 21 Mar 2020 06:14)  Source: .Urine Clean Catch (Midstream)  Final Report (22 Mar 2020 08:19):    <10,000 CFU/mL Normal Urogenital Yael    Culture - Blood (collected 20 Mar 2020 21:05)  Source: .Blood Blood-Peripheral  Preliminary Report (21 Mar 2020 22:01):    No growth to date.    Culture - Blood (collected 20 Mar 2020 21:05)  Source: .Blood Blood-Peripheral  Preliminary Report (21 Mar 2020 22:01):    No growth to date.        RADIOLOGY & ADDITIONAL TESTS:  Results Reviewed:   Imaging Personally Reviewed:  Electrocardiogram Personally Reviewed:    COORDINATION OF CARE:  Care Discussed with Consultants/Other Providers [Y/N]:  Prior or Outpatient Records Reviewed [Y/N]:

## 2020-03-22 NOTE — PROGRESS NOTE ADULT - PROBLEM SELECTOR PLAN 4
-clinically stable for discharge however group home is not sure if they can receive him. I spoke with AMADOR Fountain and explained his positive result to him. She is going to speak with her medical director and after they speak will reach out to me to speak with the medical director as well. If this does not occur today then will call tomorrow.

## 2020-03-22 NOTE — CHART NOTE - NSCHARTNOTEFT_GEN_A_CORE
I spoke with  AMADOR Fountain at [phone] from Group Home again who gave me Medical Director's phone number: [phone] to call tomorrow as the Group Home will prepare for Mr. Olivera's discharge.     She also reviewed his labs and confirmed thrombocytopenia is chronic however in 100-200 range and was being followed by his PMD and hematology however hematology recently signed off. She is not sure if labs can be checked in the group home  next week to ensure they are improving but will look into this.    Sugar Quevedo MD  Internal Medicine  Pager [phone] | [phone]

## 2020-03-23 DIAGNOSIS — B97.21 SARS-ASSOCIATED CORONAVIRUS AS THE CAUSE OF DISEASES CLASSIFIED ELSEWHERE: ICD-10-CM

## 2020-03-23 LAB
ALBUMIN SERPL ELPH-MCNC: 3.5 G/DL — SIGNIFICANT CHANGE UP (ref 3.3–5)
ALP SERPL-CCNC: 150 U/L — HIGH (ref 40–120)
ALT FLD-CCNC: 36 U/L — SIGNIFICANT CHANGE UP (ref 4–41)
ANION GAP SERPL CALC-SCNC: 12 MMO/L — SIGNIFICANT CHANGE UP (ref 7–14)
AST SERPL-CCNC: 42 U/L — HIGH (ref 4–40)
BILIRUB DIRECT SERPL-MCNC: < 0.2 MG/DL — SIGNIFICANT CHANGE UP (ref 0.1–0.2)
BILIRUB SERPL-MCNC: 0.3 MG/DL — SIGNIFICANT CHANGE UP (ref 0.2–1.2)
BUN SERPL-MCNC: 11 MG/DL — SIGNIFICANT CHANGE UP (ref 7–23)
CALCIUM SERPL-MCNC: 8.9 MG/DL — SIGNIFICANT CHANGE UP (ref 8.4–10.5)
CHLORIDE SERPL-SCNC: 103 MMOL/L — SIGNIFICANT CHANGE UP (ref 98–107)
CO2 SERPL-SCNC: 20 MMOL/L — LOW (ref 22–31)
CREAT SERPL-MCNC: 0.61 MG/DL — SIGNIFICANT CHANGE UP (ref 0.5–1.3)
GLUCOSE SERPL-MCNC: 93 MG/DL — SIGNIFICANT CHANGE UP (ref 70–99)
HCT VFR BLD CALC: 33.9 % — LOW (ref 39–50)
HCT VFR BLD CALC: 42.2 % — SIGNIFICANT CHANGE UP (ref 39–50)
HGB BLD-MCNC: 11 G/DL — LOW (ref 13–17)
HGB BLD-MCNC: 14.2 G/DL — SIGNIFICANT CHANGE UP (ref 13–17)
MAGNESIUM SERPL-MCNC: 2 MG/DL — SIGNIFICANT CHANGE UP (ref 1.6–2.6)
MCHC RBC-ENTMCNC: 25.9 PG — LOW (ref 27–34)
MCHC RBC-ENTMCNC: 30.3 PG — SIGNIFICANT CHANGE UP (ref 27–34)
MCHC RBC-ENTMCNC: 32.4 % — SIGNIFICANT CHANGE UP (ref 32–36)
MCHC RBC-ENTMCNC: 33.6 % — SIGNIFICANT CHANGE UP (ref 32–36)
MCV RBC AUTO: 80 FL — SIGNIFICANT CHANGE UP (ref 80–100)
MCV RBC AUTO: 90.2 FL — SIGNIFICANT CHANGE UP (ref 80–100)
NRBC # FLD: 0 K/UL — SIGNIFICANT CHANGE UP (ref 0–0)
NRBC # FLD: 0 K/UL — SIGNIFICANT CHANGE UP (ref 0–0)
PLATELET # BLD AUTO: 119 K/UL — LOW (ref 150–400)
PLATELET # BLD AUTO: 223 K/UL — SIGNIFICANT CHANGE UP (ref 150–400)
PMV BLD: 11.3 FL — SIGNIFICANT CHANGE UP (ref 7–13)
PMV BLD: 11.6 FL — SIGNIFICANT CHANGE UP (ref 7–13)
POTASSIUM SERPL-MCNC: 4 MMOL/L — SIGNIFICANT CHANGE UP (ref 3.5–5.3)
POTASSIUM SERPL-SCNC: 4 MMOL/L — SIGNIFICANT CHANGE UP (ref 3.5–5.3)
PROT SERPL-MCNC: 7.4 G/DL — SIGNIFICANT CHANGE UP (ref 6–8.3)
RBC # BLD: 4.24 M/UL — SIGNIFICANT CHANGE UP (ref 4.2–5.8)
RBC # BLD: 4.68 M/UL — SIGNIFICANT CHANGE UP (ref 4.2–5.8)
RBC # FLD: 12.6 % — SIGNIFICANT CHANGE UP (ref 10.3–14.5)
RBC # FLD: 13.8 % — SIGNIFICANT CHANGE UP (ref 10.3–14.5)
SODIUM SERPL-SCNC: 135 MMOL/L — SIGNIFICANT CHANGE UP (ref 135–145)
WBC # BLD: 3.63 K/UL — LOW (ref 3.8–10.5)
WBC # BLD: 4.69 K/UL — SIGNIFICANT CHANGE UP (ref 3.8–10.5)
WBC # FLD AUTO: 3.63 K/UL — LOW (ref 3.8–10.5)
WBC # FLD AUTO: 4.69 K/UL — SIGNIFICANT CHANGE UP (ref 3.8–10.5)

## 2020-03-23 PROCEDURE — 99232 SBSQ HOSP IP/OBS MODERATE 35: CPT

## 2020-03-23 RX ADMIN — Medication 25 MILLIGRAM(S): at 11:23

## 2020-03-23 RX ADMIN — Medication 0.5 MILLIGRAM(S): at 17:05

## 2020-03-23 RX ADMIN — TAMSULOSIN HYDROCHLORIDE 0.4 MILLIGRAM(S): 0.4 CAPSULE ORAL at 21:30

## 2020-03-23 RX ADMIN — KETOTIFEN FUMARATE 1 DROP(S): 0.34 SOLUTION OPHTHALMIC at 05:28

## 2020-03-23 RX ADMIN — Medication 0.5 MILLIGRAM(S): at 05:28

## 2020-03-23 RX ADMIN — Medication 1 TABLET(S): at 11:23

## 2020-03-23 RX ADMIN — KETOTIFEN FUMARATE 1 DROP(S): 0.34 SOLUTION OPHTHALMIC at 17:05

## 2020-03-23 RX ADMIN — Medication 100 MILLIGRAM(S): at 21:29

## 2020-03-23 NOTE — PROGRESS NOTE ADULT - ATTENDING COMMENTS
- Patient is clinically and hemodynamically stable for discharge.   - Discharge back to group home, SW involvement to coordinate with group home.  - Will need to self quarantine for 14 days post discharge.   - Determine if group home can accommodate the SAFEST COVID d/c instructions.  - Discharge planning time: 34 min in coordinating discharge plan with team.

## 2020-03-23 NOTE — PROGRESS NOTE ADULT - SUBJECTIVE AND OBJECTIVE BOX
PROGRESS NOTE:     Patient is a 54y old  Male who presents with a chief complaint of fever (22 Mar 2020 13:17)    SUBJECTIVE / OVERNIGHT EVENTS:  Patient seen and evaluated at bedside.   Appears comfortable, nonverbal, follows minimal commands.   Put his finger forward to allow me to check his 02 saturation.   Saturating well on room air, 97%.   No distress evident, no coughing noted, afebrile overnight.     ADDITIONAL REVIEW OF SYSTEMS:    MEDICATIONS  (STANDING):  benztropine 0.5 milliGRAM(s) Oral two times a day  chlorproMAZINE    Tablet 100 milliGRAM(s) Oral at bedtime  chlorproMAZINE    Tablet 25 milliGRAM(s) Oral daily  ketotifen 0.025% Ophthalmic Solution 1 Drop(s) Both EYES two times a day  multivitamin 1 Tablet(s) Oral daily  tamsulosin 0.4 milliGRAM(s) Oral at bedtime    MEDICATIONS  (PRN):  acetaminophen   Tablet .. 650 milliGRAM(s) Oral every 6 hours PRN Temp greater or equal to 38C (100.4F)  polyethylene glycol 3350 17 Gram(s) Oral daily PRN Constipation    CAPILLARY BLOOD GLUCOSE    I&O's Summary    22 Mar 2020 07:01  -  23 Mar 2020 07:00  --------------------------------------------------------  IN: 100 mL / OUT: 200 mL / NET: -100 mL    PHYSICAL EXAM:  Vital Signs Last 24 Hrs  T(C): 36.9 (23 Mar 2020 12:40), Max: 36.9 (22 Mar 2020 20:45)  T(F): 98.5 (23 Mar 2020 12:40), Max: 98.5 (22 Mar 2020 20:45)  HR: 88 (23 Mar 2020 12:40) (88 - 100)  BP: 111/72 (23 Mar 2020 12:40) (105/67 - 112/70)  BP(mean): --  RR: 17 (23 Mar 2020 12:40) (17 - 18)  SpO2: 97% (23 Mar 2020 12:40) (97% - 100%)    CONSTITUTIONAL: NAD, alert, awake, makes sounds to communicate, comfortable.   RESPIRATORY: Normal respiratory effort; lungs are clear to auscultation bilaterally  CARDIOVASCULAR: Regular rate and rhythm, normal S1 and S2, No lower extremity edema;   Peripheral pulses are 2+ bilaterally  ABDOMEN: Nontender to palpation, normoactive bowel sounds,  MUSCLOSKELETAL: no clubbing or cyanosis of digits; no joint swelling or tenderness to palpation  PSYCH: unable to assess given intellectual disability.     LABS: reviewed                         11.0   3.63  )-----------( 223      ( 23 Mar 2020 06:00 )             33.9     03-23    135  |  103  |  11  ----------------------------<  93  4.0   |  20<L>  |  0.61    Ca    8.9      23 Mar 2020 06:00  Mg     2.0     03-23    TPro  7.4  /  Alb  3.5  /  TBili  0.3  /  DBili  < 0.2  /  AST  42<H>  /  ALT  36  /  AlkPhos  150<H>  03-23    Culture - Urine (collected 21 Mar 2020 06:14)  Source: .Urine Clean Catch (Midstream)  Final Report (22 Mar 2020 08:19):    <10,000 CFU/mL Normal Urogenital Yael    Culture - Blood (collected 20 Mar 2020 21:05)  Source: .Blood Blood-Peripheral  Preliminary Report (21 Mar 2020 22:01):    No growth to date.    Culture - Blood (collected 20 Mar 2020 21:05)  Source: .Blood Blood-Peripheral  Preliminary Report (21 Mar 2020 22:01):    No growth to date.    RADIOLOGY & ADDITIONAL TESTS:  Results Reviewed:   Imaging Personally Reviewed:  Electrocardiogram Personally Reviewed:    COORDINATION OF CARE:  Care Discussed with Consultants/Other Providers [Y/N]:  Prior or Outpatient Records Reviewed [Y/N]:

## 2020-03-23 NOTE — PROGRESS NOTE ADULT - PROBLEM SELECTOR PLAN 4
Transitions of Care Status:  1.  Name of PCP:  2.  PCP Contacted on Admission: [ ] Y    [x ] N    3.  PCP contacted at Discharge: [ ] Y    [ ] N    [ ] N/A  4.  Post-Discharge Appointment Date and Location:  5.  Summary of Handoff given to PCP:

## 2020-03-24 ENCOUNTER — EMERGENCY (EMERGENCY)
Facility: HOSPITAL | Age: 55
LOS: 1 days | Discharge: STILL ADMITTED OR ADMINISTRATIVE | End: 2020-03-24
Admitting: EMERGENCY MEDICINE

## 2020-03-24 ENCOUNTER — TRANSCRIPTION ENCOUNTER (OUTPATIENT)
Age: 55
End: 2020-03-24

## 2020-03-24 PROCEDURE — 99231 SBSQ HOSP IP/OBS SF/LOW 25: CPT

## 2020-03-24 RX ORDER — BENZTROPINE MESYLATE 1 MG
1 TABLET ORAL
Qty: 60 | Refills: 0
Start: 2020-03-24

## 2020-03-24 RX ORDER — TAMSULOSIN HYDROCHLORIDE 0.4 MG/1
1 CAPSULE ORAL
Qty: 0 | Refills: 0 | DISCHARGE

## 2020-03-24 RX ORDER — CHLORPROMAZINE HCL 10 MG
1 TABLET ORAL
Qty: 30 | Refills: 0
Start: 2020-03-24

## 2020-03-24 RX ORDER — MIRABEGRON 50 MG/1
1 TABLET, EXTENDED RELEASE ORAL
Qty: 0 | Refills: 0 | DISCHARGE

## 2020-03-24 RX ORDER — POLYETHYLENE GLYCOL 3350 17 G/17G
0 POWDER, FOR SOLUTION ORAL
Qty: 0 | Refills: 0 | DISCHARGE

## 2020-03-24 RX ORDER — CHLORPROMAZINE HCL 10 MG
1 TABLET ORAL
Qty: 0 | Refills: 0 | DISCHARGE

## 2020-03-24 RX ORDER — OLOPATADINE HYDROCHLORIDE 1 MG/ML
1 SOLUTION/ DROPS OPHTHALMIC
Qty: 1 | Refills: 0
Start: 2020-03-24

## 2020-03-24 RX ORDER — TAMSULOSIN HYDROCHLORIDE 0.4 MG/1
1 CAPSULE ORAL
Qty: 30 | Refills: 0
Start: 2020-03-24

## 2020-03-24 RX ORDER — OLOPATADINE HYDROCHLORIDE 1 MG/ML
1 SOLUTION/ DROPS OPHTHALMIC
Qty: 0 | Refills: 0 | DISCHARGE

## 2020-03-24 RX ORDER — BENZTROPINE MESYLATE 1 MG
1 TABLET ORAL
Qty: 0 | Refills: 0 | DISCHARGE

## 2020-03-24 RX ADMIN — TAMSULOSIN HYDROCHLORIDE 0.4 MILLIGRAM(S): 0.4 CAPSULE ORAL at 22:20

## 2020-03-24 RX ADMIN — Medication 25 MILLIGRAM(S): at 12:34

## 2020-03-24 RX ADMIN — Medication 1 TABLET(S): at 12:34

## 2020-03-24 RX ADMIN — KETOTIFEN FUMARATE 1 DROP(S): 0.34 SOLUTION OPHTHALMIC at 05:43

## 2020-03-24 RX ADMIN — KETOTIFEN FUMARATE 1 DROP(S): 0.34 SOLUTION OPHTHALMIC at 17:36

## 2020-03-24 RX ADMIN — Medication 0.5 MILLIGRAM(S): at 05:43

## 2020-03-24 RX ADMIN — Medication 0.5 MILLIGRAM(S): at 17:37

## 2020-03-24 RX ADMIN — Medication 100 MILLIGRAM(S): at 22:20

## 2020-03-24 NOTE — DISCHARGE NOTE PROVIDER - NSDCCPCAREPLAN_GEN_ALL_CORE_FT
PRINCIPAL DISCHARGE DIAGNOSIS  Diagnosis: Viral syndrome  Assessment and Plan of Treatment: You were found to be positive COVID 19 virus. Please follow full instructions listed in your paperwork. Please self quarantine at home for 14 days from discharge and stay 6 feet away minimum from other individuals or animals. Do not go to work, school, public areas, or use public transportaion. Restrict outside activity except for  medical care. Please call ahead if you have an appointment with your doctor to inform them of your COVID positive status. Always wear a facemask at home. Cover your sneezes and coughs, and wash hands with soap and water for at least 20 seconds frequently. Avoid sharing personal household items. Clean all surfaces where you contact frequently such as coutners and doorknobs frequently.     If you have any worsening breathing, faster breathing or trouble with breathing call 911 immediately or your healthcare provider ahead of time and wear facemask before being seen by medical personel.   Take tylenol for your fevers. Please follow state and local rules and regulations regarding coming off of isolation.      SECONDARY DISCHARGE DIAGNOSES  Diagnosis: Thrombocytopenia  Assessment and Plan of Treatment: Stable - Outpatient follow-up

## 2020-03-24 NOTE — PROGRESS NOTE ADULT - SUBJECTIVE AND OBJECTIVE BOX
PROGRESS NOTE:     Patient is a 54y old  Male who presents with a chief complaint of fever (22 Mar 2020 13:17)    SUBJECTIVE / OVERNIGHT EVENTS:  Patient seen and evaluated at bedside.   Appears comfortable, nonverbal, follows minimal commands.   Again placed finger forward to allow me to check his 02 saturation.   Smiled, no distress evident, no coughing noted, afebrile overnight.   Saturating well on room air.    ADDITIONAL REVIEW OF SYSTEMS:    MEDICATIONS  (STANDING):  benztropine 0.5 milliGRAM(s) Oral two times a day  chlorproMAZINE    Tablet 100 milliGRAM(s) Oral at bedtime  chlorproMAZINE    Tablet 25 milliGRAM(s) Oral daily  ketotifen 0.025% Ophthalmic Solution 1 Drop(s) Both EYES two times a day  multivitamin 1 Tablet(s) Oral daily  tamsulosin 0.4 milliGRAM(s) Oral at bedtime    MEDICATIONS  (PRN):  acetaminophen   Tablet .. 650 milliGRAM(s) Oral every 6 hours PRN Temp greater or equal to 38C (100.4F)  polyethylene glycol 3350 17 Gram(s) Oral daily PRN Constipation    CAPILLARY BLOOD GLUCOSE    I&O's Summary    23 Mar 2020 07:01  -  24 Mar 2020 07:00  --------------------------------------------------------  IN: 458 mL / OUT: 400 mL / NET: 58 mL    PHYSICAL EXAM:  Vital Signs Last 24 Hrs  T(C): 36.6 (24 Mar 2020 05:33), Max: 36.9 (23 Mar 2020 21:23)  T(F): 97.9 (24 Mar 2020 05:33), Max: 98.4 (23 Mar 2020 21:23)  HR: 91 (24 Mar 2020 05:33) (84 - 91)  BP: 110/69 (24 Mar 2020 05:33) (103/66 - 110/69)  BP(mean): --  RR: 17 (24 Mar 2020 05:33) (17 - 18)  SpO2: 98% (24 Mar 2020 05:33) (97% - 98%)    CONSTITUTIONAL: NAD, alert, awake, +eye contact, makes sounds to communicate, comfortable.   RESPIRATORY: Normal respiratory effort; lungs are clear to auscultation bilaterally  CARDIOVASCULAR: Regular rate and rhythm, normal S1 and S2, No lower extremity edema;   Peripheral pulses are 2+ bilaterally  ABDOMEN: Nontender to palpation, normoactive bowel sounds,  MUSCLOSKELETAL: no clubbing or cyanosis of digits; no joint swelling or tenderness to palpation  PSYCH: unable to assess given intellectual disability.     LABS: reviewed                         14.2   4.69  )-----------( 119      ( 23 Mar 2020 18:14 )             42.2     03-23    135  |  103  |  11  ----------------------------<  93  4.0   |  20<L>  |  0.61    Ca    8.9      23 Mar 2020 06:00  Mg     2.0     03-23    TPro  7.4  /  Alb  3.5  /  TBili  0.3  /  DBili  < 0.2  /  AST  42<H>  /  ALT  36  /  AlkPhos  150<H>  03-23    RADIOLOGY & ADDITIONAL TESTS:  Results Reviewed:   Imaging Personally Reviewed:  Electrocardiogram Personally Reviewed:    COORDINATION OF CARE:  Care Discussed with Consultants/Other Providers [Y/N]:  Prior or Outpatient Records Reviewed [Y/N]:

## 2020-03-24 NOTE — PROGRESS NOTE ADULT - PROBLEM SELECTOR PLAN 4
Transitions of Care Status:  1.  Name of PCP:  2.  PCP Contacted on Admission: [ ] Y    [x ] N    3.  PCP contacted at Discharge: [ ] Y    [ ] N    [ ] N/A  4.  Post-Discharge Appointment Date and Location:  5.  Summary of Handoff given to PCP: Transitions of Care Status:  1.  Name of PCP:  2.  PCP Contacted on Admission: [ ] Y    [x ] N    3.  PCP contacted at Discharge: [ ] Y    [ ] N    [ ] N/A  4.  Post-Discharge Appointment Date and Location:  5.  Summary of Handoff given to PCP:    - Discharge planning time 34 min in coordinating d/c plan with team.

## 2020-03-24 NOTE — DISCHARGE NOTE PROVIDER - NSDCMRMEDTOKEN_GEN_ALL_CORE_FT
benztropine 0.5 mg oral tablet: 1 tab(s) orally 2 times a day  chlorproMAZINE 100 mg oral tablet: 1 tab(s) orally once a day (at bedtime)  chlorproMAZINE 25 mg oral tablet: 1 tab(s) orally once a day in the morning  Flomax 0.4 mg oral capsule: 1 cap(s) orally once a day  MiraLax oral powder for reconstitution: 1 packet(s) orally once a day  Multiple Vitamins oral tablet: 1 tab(s) orally once a day  Pazeo 0.7% ophthalmic solution: 1 drop(s) to each affected eye 2 times a day

## 2020-03-24 NOTE — DISCHARGE NOTE PROVIDER - HOSPITAL COURSE
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 On room air not requiring supplemental O2.        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 Plan to discharge patient back to group home.

## 2020-03-25 ENCOUNTER — TRANSCRIPTION ENCOUNTER (OUTPATIENT)
Age: 55
End: 2020-03-25

## 2020-03-25 VITALS
TEMPERATURE: 98 F | SYSTOLIC BLOOD PRESSURE: 106 MMHG | DIASTOLIC BLOOD PRESSURE: 67 MMHG | RESPIRATION RATE: 17 BRPM | HEART RATE: 83 BPM | OXYGEN SATURATION: 100 %

## 2020-03-25 LAB
CULTURE RESULTS: SIGNIFICANT CHANGE UP
CULTURE RESULTS: SIGNIFICANT CHANGE UP
SPECIMEN SOURCE: SIGNIFICANT CHANGE UP
SPECIMEN SOURCE: SIGNIFICANT CHANGE UP

## 2020-03-25 PROCEDURE — 99239 HOSP IP/OBS DSCHRG MGMT >30: CPT

## 2020-03-25 RX ADMIN — KETOTIFEN FUMARATE 1 DROP(S): 0.34 SOLUTION OPHTHALMIC at 06:29

## 2020-03-25 RX ADMIN — Medication 25 MILLIGRAM(S): at 11:45

## 2020-03-25 RX ADMIN — Medication 1 TABLET(S): at 11:46

## 2020-03-25 RX ADMIN — Medication 0.5 MILLIGRAM(S): at 06:29

## 2020-03-25 NOTE — PROGRESS NOTE ADULT - PROBLEM SELECTOR PROBLEM 1
SARS-associated coronavirus infection
Viral syndrome
Viral syndrome

## 2020-03-25 NOTE — PROGRESS NOTE ADULT - PROBLEM SELECTOR PLAN 4
Transitions of Care Status:  1.  Name of PCP:  2.  PCP Contacted on Admission: [ ] Y    [x ] N    3.  PCP contacted at Discharge: [ ] Y    [ ] N    [ ] N/A  4.  Post-Discharge Appointment Date and Location:  5.  Summary of Handoff given to PCP:    - Discharge planning time 34 min in coordinating d/c plan with team.

## 2020-03-25 NOTE — PROGRESS NOTE ADULT - SUBJECTIVE AND OBJECTIVE BOX
PROGRESS NOTE:     Patient is a 54y old  Male who presents with a chief complaint of fever (22 Mar 2020 13:17)    SUBJECTIVE / OVERNIGHT EVENTS:  Patient seen and evaluated at bedside.   No acute distress noted, patient appears at baseline: nonverbal, maintaining eye contact.   No overnight events reported.   Remains clinically and hemodynamically stable, afebrile, saturating well.     ADDITIONAL REVIEW OF SYSTEMS: unable to attain given baseline mental status.     MEDICATIONS  (STANDING):  benztropine 0.5 milliGRAM(s) Oral two times a day  chlorproMAZINE    Tablet 100 milliGRAM(s) Oral at bedtime  chlorproMAZINE    Tablet 25 milliGRAM(s) Oral daily  ketotifen 0.025% Ophthalmic Solution 1 Drop(s) Both EYES two times a day  multivitamin 1 Tablet(s) Oral daily  tamsulosin 0.4 milliGRAM(s) Oral at bedtime    MEDICATIONS  (PRN):  acetaminophen   Tablet .. 650 milliGRAM(s) Oral every 6 hours PRN Temp greater or equal to 38C (100.4F)  polyethylene glycol 3350 17 Gram(s) Oral daily PRN Constipation    CAPILLARY BLOOD GLUCOSE    I&O's Summary    PHYSICAL EXAM:  Vital Signs Last 24 Hrs  T(C): 36.7 (25 Mar 2020 12:57), Max: 37.1 (24 Mar 2020 21:34)  T(F): 98.1 (25 Mar 2020 12:57), Max: 98.7 (24 Mar 2020 21:34)  HR: 83 (25 Mar 2020 12:57) (83 - 88)  BP: 106/67 (25 Mar 2020 12:57) (103/69 - 110/72)  BP(mean): --  RR: 17 (25 Mar 2020 12:57) (17 - 18)  SpO2: 100% (25 Mar 2020 12:57) (97% - 100%)    CONSTITUTIONAL: NAD, alert, awake, +eye contact, makes sounds to communicate, comfortable.   RESPIRATORY: Normal respiratory effort; lungs are clear to auscultation bilaterally  CARDIOVASCULAR: Regular rate and rhythm, normal S1 and S2, No lower extremity edema;   Peripheral pulses are 2+ bilaterally  ABDOMEN: Nontender to palpation, normoactive bowel sounds,  MUSCLOSKELETAL: no clubbing or cyanosis of digits; no joint swelling or tenderness to palpation  PSYCH: unable to assess given intellectual disability.     LABS: none new to review.    RADIOLOGY & ADDITIONAL TESTS:  Results Reviewed:   Imaging Personally Reviewed:  Electrocardiogram Personally Reviewed:    COORDINATION OF CARE:  Care Discussed with Consultants/Other Providers [Y/N]:  Prior or Outpatient Records Reviewed [Y/N]:

## 2020-03-25 NOTE — PROGRESS NOTE ADULT - PROBLEM SELECTOR PLAN 3
- Continue Chlorpromazine and Benztropine

## 2020-03-25 NOTE — PROGRESS NOTE ADULT - PROBLEM SELECTOR PLAN 2
- Chlorpromazine can cause thrombocytopenia  - Per RN Essie from [facility] reports thrombocytopenia chronic.
- Chlorpromazine can cause thrombocytopenia  - Per RN Essie from [facility] reports thrombocytopenia chronic.
- Chlorpromazine can cause thrombocytopenia  - Per RN Essie from [facility] reports thrombocytopenia chronic.
-chlorpromazine can cause thrombocytopenia  -will spoke with AMADOR Fountain from [facility] who stated she would have records available tomorrow to see if thrombocytopenia was chronic
Unclear etiology. No baseline level available. Spontaneously improved on repeat testing. No signs of acute bleeding  - no need for further inpatient w/u. Recommend outpatient f/u

## 2020-03-25 NOTE — PROGRESS NOTE ADULT - ASSESSMENT
54M with developmental delay (non verbal at baseline) presenting from adult home residence with symptoms of fever and cough likely secondary to COVID 19.
A 54 year old Male patient with developmental delay (non verbal at baseline) presenting from adult home residence with symptoms of fever and cough likely secondary to COVID 19.
Mr. Olivera is a 53 yo man with developmental delay (non verbal at baseline) presenting from adult home residence with symptoms of fever and cough, admitted for w/u and COVID-19 testing. Pt currently clinically stable.

## 2020-03-25 NOTE — PROGRESS NOTE ADULT - PROBLEM SELECTOR PLAN 1
- COVID 19 Positive for SARS associated coronavirus infection.   - Symptoms: non able to attain but appears comfortable, no coughing, or distress.  - Oxygen requirements: Still saturating 100% on room air.   - Monitored respiratory status, stable >24 hours.   - Supportive measures, Tylenol PRN,  monitor respiratory status  - Patient is clinically and hemodynamically stable for discharge.   - Discharge back to group home, SW involvement to coordinate with group home.  - Will need to self quarantine for 14 days post discharge.   - Determine if group home can accommodate the SAFEST COVID d/c instructions.
- COVID 19 Positive for SARS associated coronavirus infection.   - Symptoms: non able to attain but appears comfortable, no coughing, or distress.  - Oxygen requirements: Still saturating 100% on room air.   - Monitored respiratory status, stable >24 hours.   - Supportive measures, Tylenol PRN,  monitor respiratory status  - Patient is clinically and hemodynamically stable for discharge.   - Discharge back to group home, SW involvement to coordinate with group home.  - Will need to self quarantine for 14 days post discharge.   - Determine if group home can accommodate the SAFEST COVID d/c instructions.
- COVID 19 Positive for SARS associated coronavirus infection.   - Symptoms: non able to attain but appears comfortable, no coughing, or distress.  - Oxygen requirements: Still saturating 100% on room air.   - Monitored respiratory status, stable >24 hours.   - Supportive measures, Tylenol PRN,  monitor respiratory status  - Patient is clinically and hemodynamically stable for discharge.   - Discharge back to group home, SW involvement to coordinate with group home.  - Will need to self quarantine for 14 days post discharge.   - Determine if group home can accommodate the SAFEST COVID d/c instructions.  - Per SW, patient accepted back to group home on 3/25.
-COVID Positive, still saturating 100% on room air  - Tylenol PRN,  monitor respiratory status  -possibly ready for discharge
RVP negative. CXR negative. Currently with adequate oxygenation on RA. VS stable.   - f/u results of COVID 19 test  - Tylenol PRN  - monitor respiratory status.

## 2020-03-25 NOTE — DISCHARGE NOTE NURSING/CASE MANAGEMENT/SOCIAL WORK - PATIENT PORTAL LINK FT
You can access the FollowMyHealth Patient Portal offered by [facility] by registering at the following website: [email or web address]. By joining [employer]’s FollowMyHealth portal, you will also be able to view your health information using other applications (apps) compatible with our system.

## 2021-01-14 ENCOUNTER — INPATIENT (INPATIENT)
Facility: HOSPITAL | Age: 56
LOS: 20 days | Discharge: NURSING, REHAB OR LONG-TERM CARE | End: 2021-02-04
Attending: SURGERY | Admitting: SURGERY
Payer: MEDICAID

## 2021-01-14 VITALS
OXYGEN SATURATION: 99 % | DIASTOLIC BLOOD PRESSURE: 83 MMHG | HEART RATE: 128 BPM | RESPIRATION RATE: 18 BRPM | TEMPERATURE: 98 F | SYSTOLIC BLOOD PRESSURE: 123 MMHG

## 2021-01-14 DIAGNOSIS — K56.2 VOLVULUS: ICD-10-CM

## 2021-01-14 LAB
ALBUMIN SERPL ELPH-MCNC: 4.3 G/DL — SIGNIFICANT CHANGE UP (ref 3.3–5)
ALP SERPL-CCNC: 77 U/L — SIGNIFICANT CHANGE UP (ref 40–120)
ALT FLD-CCNC: 17 U/L — SIGNIFICANT CHANGE UP (ref 4–41)
ANION GAP SERPL CALC-SCNC: 10 MMOL/L — SIGNIFICANT CHANGE UP (ref 7–14)
ANION GAP SERPL CALC-SCNC: 15 MMOL/L — HIGH (ref 7–14)
ANISOCYTOSIS BLD QL: SLIGHT — SIGNIFICANT CHANGE UP
APTT BLD: 33.8 SEC — SIGNIFICANT CHANGE UP (ref 27–36.3)
AST SERPL-CCNC: 32 U/L — SIGNIFICANT CHANGE UP (ref 4–40)
BASE EXCESS BLDV CALC-SCNC: 2.8 MMOL/L — HIGH (ref -3–2)
BASOPHILS # BLD AUTO: 0.01 K/UL — SIGNIFICANT CHANGE UP (ref 0–0.2)
BASOPHILS NFR BLD AUTO: 0.1 % — SIGNIFICANT CHANGE UP (ref 0–2)
BILIRUB SERPL-MCNC: 0.2 MG/DL — SIGNIFICANT CHANGE UP (ref 0.2–1.2)
BLD GP AB SCN SERPL QL: NEGATIVE — SIGNIFICANT CHANGE UP
BLOOD GAS VENOUS - CREATININE: 0.7 MG/DL — SIGNIFICANT CHANGE UP (ref 0.5–1.3)
BLOOD GAS VENOUS COMPREHENSIVE RESULT: SIGNIFICANT CHANGE UP
BLOOD GAS VENOUS COMPREHENSIVE RESULT: SIGNIFICANT CHANGE UP
BUN SERPL-MCNC: 21 MG/DL — SIGNIFICANT CHANGE UP (ref 7–23)
BUN SERPL-MCNC: 24 MG/DL — HIGH (ref 7–23)
CALCIUM SERPL-MCNC: 8.1 MG/DL — LOW (ref 8.4–10.5)
CALCIUM SERPL-MCNC: 8.9 MG/DL — SIGNIFICANT CHANGE UP (ref 8.4–10.5)
CHLORIDE BLDV-SCNC: 110 MMOL/L — HIGH (ref 96–108)
CHLORIDE SERPL-SCNC: 100 MMOL/L — SIGNIFICANT CHANGE UP (ref 98–107)
CHLORIDE SERPL-SCNC: 106 MMOL/L — SIGNIFICANT CHANGE UP (ref 98–107)
CO2 SERPL-SCNC: 24 MMOL/L — SIGNIFICANT CHANGE UP (ref 22–31)
CO2 SERPL-SCNC: 25 MMOL/L — SIGNIFICANT CHANGE UP (ref 22–31)
CREAT SERPL-MCNC: 0.71 MG/DL — SIGNIFICANT CHANGE UP (ref 0.5–1.3)
CREAT SERPL-MCNC: 0.78 MG/DL — SIGNIFICANT CHANGE UP (ref 0.5–1.3)
EOSINOPHIL # BLD AUTO: 0 K/UL — SIGNIFICANT CHANGE UP (ref 0–0.5)
EOSINOPHIL NFR BLD AUTO: 0 % — SIGNIFICANT CHANGE UP (ref 0–6)
GAS PNL BLDV: 140 MMOL/L — SIGNIFICANT CHANGE UP (ref 136–146)
GLUCOSE BLDC GLUCOMTR-MCNC: 111 MG/DL — HIGH (ref 70–99)
GLUCOSE BLDV-MCNC: 100 MG/DL — HIGH (ref 70–99)
GLUCOSE SERPL-MCNC: 106 MG/DL — HIGH (ref 70–99)
GLUCOSE SERPL-MCNC: 289 MG/DL — HIGH (ref 70–99)
HCO3 BLDV-SCNC: 26 MMOL/L — SIGNIFICANT CHANGE UP (ref 20–27)
HCT VFR BLD CALC: 40 % — SIGNIFICANT CHANGE UP (ref 39–50)
HCT VFR BLD CALC: 43 % — SIGNIFICANT CHANGE UP (ref 39–50)
HCT VFR BLDA CALC: 42.3 % — SIGNIFICANT CHANGE UP (ref 39–51)
HGB BLD CALC-MCNC: 13.8 G/DL — SIGNIFICANT CHANGE UP (ref 13–17)
HGB BLD-MCNC: 13.3 G/DL — SIGNIFICANT CHANGE UP (ref 13–17)
HGB BLD-MCNC: 14.3 G/DL — SIGNIFICANT CHANGE UP (ref 13–17)
IANC: 9.62 K/UL — HIGH (ref 1.5–8.5)
IMM GRANULOCYTES NFR BLD AUTO: 0.6 % — SIGNIFICANT CHANGE UP (ref 0–1.5)
INR BLD: 1.1 RATIO — SIGNIFICANT CHANGE UP (ref 0.88–1.16)
LACTATE BLDV-MCNC: 0.9 MMOL/L — SIGNIFICANT CHANGE UP (ref 0.5–2)
LACTATE SERPL-SCNC: 0.8 MMOL/L — SIGNIFICANT CHANGE UP (ref 0.5–2)
LIDOCAIN IGE QN: 14 U/L — SIGNIFICANT CHANGE UP (ref 7–60)
LYMPHOCYTES # BLD AUTO: 0.42 K/UL — LOW (ref 1–3.3)
LYMPHOCYTES # BLD AUTO: 4 % — LOW (ref 13–44)
MACROCYTES BLD QL: SLIGHT — SIGNIFICANT CHANGE UP
MAGNESIUM SERPL-MCNC: 2.7 MG/DL — HIGH (ref 1.6–2.6)
MANUAL SMEAR VERIFICATION: SIGNIFICANT CHANGE UP
MCHC RBC-ENTMCNC: 30.2 PG — SIGNIFICANT CHANGE UP (ref 27–34)
MCHC RBC-ENTMCNC: 30.4 PG — SIGNIFICANT CHANGE UP (ref 27–34)
MCHC RBC-ENTMCNC: 33.3 GM/DL — SIGNIFICANT CHANGE UP (ref 32–36)
MCHC RBC-ENTMCNC: 33.3 GM/DL — SIGNIFICANT CHANGE UP (ref 32–36)
MCV RBC AUTO: 90.9 FL — SIGNIFICANT CHANGE UP (ref 80–100)
MCV RBC AUTO: 91.3 FL — SIGNIFICANT CHANGE UP (ref 80–100)
MONOCYTES # BLD AUTO: 0.43 K/UL — SIGNIFICANT CHANGE UP (ref 0–0.9)
MONOCYTES NFR BLD AUTO: 4.1 % — SIGNIFICANT CHANGE UP (ref 2–14)
NEUTROPHILS # BLD AUTO: 9.62 K/UL — HIGH (ref 1.8–7.4)
NEUTROPHILS NFR BLD AUTO: 91.2 % — HIGH (ref 43–77)
NRBC # BLD: 0 /100 WBCS — SIGNIFICANT CHANGE UP
NRBC # BLD: 0 /100 WBCS — SIGNIFICANT CHANGE UP
NRBC # FLD: 0 K/UL — SIGNIFICANT CHANGE UP
NRBC # FLD: 0 K/UL — SIGNIFICANT CHANGE UP
PCO2 BLDV: 46 MMHG — SIGNIFICANT CHANGE UP (ref 41–51)
PH BLDV: 7.39 — SIGNIFICANT CHANGE UP (ref 7.32–7.43)
PHOSPHATE SERPL-MCNC: 2 MG/DL — LOW (ref 2.5–4.5)
PLAT MORPH BLD: NORMAL — SIGNIFICANT CHANGE UP
PLATELET # BLD AUTO: 146 K/UL — LOW (ref 150–400)
PLATELET # BLD AUTO: 155 K/UL — SIGNIFICANT CHANGE UP (ref 150–400)
PLATELET COUNT - ESTIMATE: NORMAL — SIGNIFICANT CHANGE UP
PO2 BLDV: 68 MMHG — HIGH (ref 35–40)
POTASSIUM BLDV-SCNC: 2.9 MMOL/L — CRITICAL LOW (ref 3.4–4.5)
POTASSIUM SERPL-MCNC: 3.1 MMOL/L — LOW (ref 3.5–5.3)
POTASSIUM SERPL-MCNC: 4.1 MMOL/L — SIGNIFICANT CHANGE UP (ref 3.5–5.3)
POTASSIUM SERPL-SCNC: 3.1 MMOL/L — LOW (ref 3.5–5.3)
POTASSIUM SERPL-SCNC: 4.1 MMOL/L — SIGNIFICANT CHANGE UP (ref 3.5–5.3)
PROT SERPL-MCNC: 7.5 G/DL — SIGNIFICANT CHANGE UP (ref 6–8.3)
PROTHROM AB SERPL-ACNC: 12.6 SEC — SIGNIFICANT CHANGE UP (ref 10.6–13.6)
RBC # BLD: 4.38 M/UL — SIGNIFICANT CHANGE UP (ref 4.2–5.8)
RBC # BLD: 4.73 M/UL — SIGNIFICANT CHANGE UP (ref 4.2–5.8)
RBC # FLD: 12.9 % — SIGNIFICANT CHANGE UP (ref 10.3–14.5)
RBC # FLD: 12.9 % — SIGNIFICANT CHANGE UP (ref 10.3–14.5)
RBC BLD AUTO: ABNORMAL
RH IG SCN BLD-IMP: POSITIVE — SIGNIFICANT CHANGE UP
SAO2 % BLDV: 92.8 % — HIGH (ref 60–85)
SARS-COV-2 RNA SPEC QL NAA+PROBE: SIGNIFICANT CHANGE UP
SODIUM SERPL-SCNC: 139 MMOL/L — SIGNIFICANT CHANGE UP (ref 135–145)
SODIUM SERPL-SCNC: 141 MMOL/L — SIGNIFICANT CHANGE UP (ref 135–145)
VARIANT LYMPHS # BLD: 1.7 % — SIGNIFICANT CHANGE UP (ref 0–6)
WBC # BLD: 10.54 K/UL — HIGH (ref 3.8–10.5)
WBC # BLD: 13.09 K/UL — HIGH (ref 3.8–10.5)
WBC # FLD AUTO: 10.54 K/UL — HIGH (ref 3.8–10.5)
WBC # FLD AUTO: 13.09 K/UL — HIGH (ref 3.8–10.5)

## 2021-01-14 PROCEDURE — 99222 1ST HOSP IP/OBS MODERATE 55: CPT

## 2021-01-14 PROCEDURE — 74177 CT ABD & PELVIS W/CONTRAST: CPT | Mod: 26

## 2021-01-14 PROCEDURE — 99285 EMERGENCY DEPT VISIT HI MDM: CPT

## 2021-01-14 RX ORDER — ACETAMINOPHEN 500 MG
650 TABLET ORAL EVERY 6 HOURS
Refills: 0 | Status: DISCONTINUED | OUTPATIENT
Start: 2021-01-14 | End: 2021-01-18

## 2021-01-14 RX ORDER — POTASSIUM CHLORIDE 20 MEQ
10 PACKET (EA) ORAL
Refills: 0 | Status: COMPLETED | OUTPATIENT
Start: 2021-01-14 | End: 2021-01-14

## 2021-01-14 RX ORDER — SODIUM CHLORIDE 9 MG/ML
1000 INJECTION INTRAMUSCULAR; INTRAVENOUS; SUBCUTANEOUS ONCE
Refills: 0 | Status: COMPLETED | OUTPATIENT
Start: 2021-01-14 | End: 2021-01-14

## 2021-01-14 RX ORDER — TAMSULOSIN HYDROCHLORIDE 0.4 MG/1
0.4 CAPSULE ORAL AT BEDTIME
Refills: 0 | Status: DISCONTINUED | OUTPATIENT
Start: 2021-01-14 | End: 2021-02-04

## 2021-01-14 RX ORDER — CHLORPROMAZINE HCL 10 MG
100 TABLET ORAL AT BEDTIME
Refills: 0 | Status: DISCONTINUED | OUTPATIENT
Start: 2021-01-14 | End: 2021-02-04

## 2021-01-14 RX ORDER — BENZTROPINE MESYLATE 1 MG
0.5 TABLET ORAL
Refills: 0 | Status: DISCONTINUED | OUTPATIENT
Start: 2021-01-14 | End: 2021-02-04

## 2021-01-14 RX ORDER — CHLORPROMAZINE HCL 10 MG
25 TABLET ORAL DAILY
Refills: 0 | Status: DISCONTINUED | OUTPATIENT
Start: 2021-01-14 | End: 2021-02-04

## 2021-01-14 RX ORDER — ENOXAPARIN SODIUM 100 MG/ML
40 INJECTION SUBCUTANEOUS DAILY
Refills: 0 | Status: DISCONTINUED | OUTPATIENT
Start: 2021-01-14 | End: 2021-02-04

## 2021-01-14 RX ORDER — MORPHINE SULFATE 50 MG/1
4 CAPSULE, EXTENDED RELEASE ORAL ONCE
Refills: 0 | Status: DISCONTINUED | OUTPATIENT
Start: 2021-01-14 | End: 2021-01-14

## 2021-01-14 RX ORDER — SODIUM CHLORIDE 9 MG/ML
1000 INJECTION, SOLUTION INTRAVENOUS
Refills: 0 | Status: DISCONTINUED | OUTPATIENT
Start: 2021-01-14 | End: 2021-01-15

## 2021-01-14 RX ORDER — POTASSIUM PHOSPHATE, MONOBASIC POTASSIUM PHOSPHATE, DIBASIC 236; 224 MG/ML; MG/ML
15 INJECTION, SOLUTION INTRAVENOUS ONCE
Refills: 0 | Status: COMPLETED | OUTPATIENT
Start: 2021-01-14 | End: 2021-01-14

## 2021-01-14 RX ADMIN — ENOXAPARIN SODIUM 40 MILLIGRAM(S): 100 INJECTION SUBCUTANEOUS at 14:39

## 2021-01-14 RX ADMIN — SODIUM CHLORIDE 1000 MILLILITER(S): 9 INJECTION INTRAMUSCULAR; INTRAVENOUS; SUBCUTANEOUS at 12:27

## 2021-01-14 RX ADMIN — SODIUM CHLORIDE 1000 MILLILITER(S): 9 INJECTION INTRAMUSCULAR; INTRAVENOUS; SUBCUTANEOUS at 11:07

## 2021-01-14 RX ADMIN — Medication 100 MILLIEQUIVALENT(S): at 20:26

## 2021-01-14 RX ADMIN — TAMSULOSIN HYDROCHLORIDE 0.4 MILLIGRAM(S): 0.4 CAPSULE ORAL at 22:33

## 2021-01-14 RX ADMIN — Medication 100 MILLIGRAM(S): at 23:08

## 2021-01-14 RX ADMIN — SODIUM CHLORIDE 125 MILLILITER(S): 9 INJECTION, SOLUTION INTRAVENOUS at 14:39

## 2021-01-14 RX ADMIN — Medication 100 MILLIEQUIVALENT(S): at 18:00

## 2021-01-14 RX ADMIN — MORPHINE SULFATE 4 MILLIGRAM(S): 50 CAPSULE, EXTENDED RELEASE ORAL at 11:07

## 2021-01-14 RX ADMIN — POTASSIUM PHOSPHATE, MONOBASIC POTASSIUM PHOSPHATE, DIBASIC 62.5 MILLIMOLE(S): 236; 224 INJECTION, SOLUTION INTRAVENOUS at 18:09

## 2021-01-14 RX ADMIN — Medication 0.5 MILLIGRAM(S): at 22:33

## 2021-01-14 RX ADMIN — SODIUM CHLORIDE 125 MILLILITER(S): 9 INJECTION, SOLUTION INTRAVENOUS at 22:33

## 2021-01-14 RX ADMIN — Medication 100 MILLIEQUIVALENT(S): at 19:12

## 2021-01-14 NOTE — ED PROVIDER NOTE - SEVERE SEPSIS ALERT DETAILS
Dr. Dumont: Noted elevated lactate and tachycardia mostly likely do to volvulus over an infectious etiology.

## 2021-01-14 NOTE — H&P ADULT - ATTENDING COMMENTS
The patient was seen and examined, chart and notes reviewed.  The current diagnosis, plan of care and alternatives have been discussed with the patient.  All questions have been answered and updates have been discussed.  The case was discussed with B team residents/PA's and medical students at morning B surgical rounds and throughout the course of the day.    Sigmoid volvulus  a.  Admit to B surgery  b.  CT reviewed  c.  Consistent with sigmoid volvulus  d.  Discuss with GI, to undergo emergent colonoscopy  e.  Concern for ischemia as pt both hypotensive and with elevated lactate  but exam reveals distention and non-peritoneal    At risk for malnutrition  a.  NPO  b.  Continue IVF resuscitation

## 2021-01-14 NOTE — ED PROVIDER NOTE - CLINICAL SUMMARY MEDICAL DECISION MAKING FREE TEXT BOX
middle aged M very uncomfortable appearing with distended rigid abdomen, tachycardic. Concern for peritonitis. will check labs, lactate, IVF and morphine. Surg consult 56 y/o male male with PMhx of MR and Seizures, very uncomfortable appearing with distended rigid abdomen, tachycardic. Concern for peritonitis/perforation/SBO  will check labs, lactate, IVF and morphine. Surg consult

## 2021-01-14 NOTE — ED ADULT NURSE NOTE - OBJECTIVE STATEMENT
Pt to ED from [facility] with [facility] staff, A/o to stimuli, non verbal at baseline, but with aid who is familiar with the pt. Per aid pt c/o abd pain. She states the pt has been acting differently for the past 2 days. Noticed a rigid, distended abdomin and is grunting more than usual. Pt had a BM last night and ate breakfast this morning. Pt had one episode of vomiting this AM in route to ED. Pt ambulates with assistance, denies fever, chills, cough, diarrhea, or hematuria. Connected to cardiac monitor, sinus tach, 20g IV RAC placed, patent, and no pain at site. Bed in lowest position, will continue to assess.

## 2021-01-14 NOTE — ED PROVIDER NOTE - ATTENDING CONTRIBUTION TO CARE
Pt was seen and evaluated by me. Pt is a 56y/o M w/ h/o MR, non verbal, and Seizures who presented to the ED for abd pain X 2 days. Pt presented from a facility with aides who noted pt has had abd pain for the past 2 days and then distention with grunting today. Pt did attempt to eat today but reportedly spit it out. Pt is reported to have a BM this morning. Pt is noted to be uncomfortable with abd distention. Lungs CTA b/l. Tachy. Firm abd pain.   Concern for peritonitis/perforation/SBO  Labs, CT, Analgesia

## 2021-01-14 NOTE — CONSULT NOTE ADULT - SUBJECTIVE AND OBJECTIVE BOX
Chief Complaint:  Patient is a 55y old  Male who presents with a chief complaint of     HPI:  Mr. Olivera is a 54yo M with PMH of MR (non verbal at baseline) who presents with abdominal pain. GI consulted for volvulus.    Per aid patient was seem to be more uncomfortable for the past 2days, with worsening abdominal distention for 1 day. Patient is constipated at baseline, last BM was this morning. Denies fevers, chills, cough, diarrhea, hematuria, history of surgeries. No similar episodes in the past.  Patient reportedly ate the morning of admission (but spit out some food).    In the ED: VSS, CT abdomen done, GI consulted.     Allergies:  No Known Allergies      Home Medications:  MiraLax oral powder for reconstitution: 1 packet(s) orally once a day (24 Mar 2020 17:54)  Multiple Vitamins oral tablet: 1 tab(s) orally once a day (21 Mar 2020 02:01)    Hospital Medications:      PMHX/PSHX:  Seizure disorder    Autism disorder    No significant past surgical history        Family history:  No pertinent family history in first degree relatives        Denies family history of colon cancer/polyps, stomach cancer/polyps, pancreatic cancer/masses, liver cancer/disease, ovarian cancer and endometrial cancer.    Social History:     Tob: Denies  EtOH: Denies  Illicit Drugs: Denies    ROS:     General:  No wt loss, fevers, chills, night sweats, fatigue  Eyes:  Good vision, no reported pain  ENT:  No sore throat, pain, runny nose, dysphagia  CV:  No pain, palpitations, hypo/hypertension  Pulm:  No dyspnea, cough, tachypnea, wheezing  GI:  No pain, No nausea, No vomiting, No diarrhea, No constipation, No weight loss, No fever, No pruritis, No rectal bleeding, No tarry stools, No dysphagia,  :  No pain, bleeding, incontinence, nocturia  Muscle:  No pain, weakness  Neuro:  No weakness, tingling, memory problems  Psych:  No fatigue, insomnia, mood problems, depression  Endocrine:  No polyuria, polydipsia, cold/heat intolerance  Heme:  No petechiae, ecchymosis, easy bruisability  Skin:  No rash, tattoos, scars, edema    PHYSICAL EXAM:     GENERAL:  No acute distress  HEENT:  Normocephalic/atraumatic, no scleral icterus  CHEST:  Clear to auscultation bilaterally, no wheezes/rales/ronchi, no accessory muscle use  HEART:  Regular rate and rhythm, no murmurs/rubs/gallops  ABDOMEN:  Soft, non-tender, non-distended, normoactive bowel sounds,  no masses, no hepato-splenomegaly, no signs of chronic liver disease  EXTREMITIES: No cyanosis, clubbing, or edema  SKIN:  No rash/erythema/ecchymoses/petechiae/wounds/abscess/warm/dry  NEURO:  Alert and oriented x 3, no asterixis    Vital Signs:  Vital Signs Last 24 Hrs  T(C): 36.7 (14 Jan 2021 09:51), Max: 36.7 (14 Jan 2021 09:51)  T(F): 98 (14 Jan 2021 09:51), Max: 98 (14 Jan 2021 09:51)  HR: 114 (14 Jan 2021 10:51) (114 - 128)  BP: 139/96 (14 Jan 2021 10:51) (123/83 - 139/96)  BP(mean): --  RR: 20 (14 Jan 2021 10:51) (18 - 20)  SpO2: 99% (14 Jan 2021 10:51) (99% - 99%)  Daily     Daily     LABS:                        14.3   10.54 )-----------( 155      ( 14 Jan 2021 11:12 )             43.0     Mean Cell Volume: 90.9 fL (01-14-21 @ 11:12)    01-14    139  |  100  |  24<H>  ----------------------------<  289<H>  4.1   |  24  |  0.78    Ca    8.9      14 Jan 2021 11:12    TPro  7.5  /  Alb  4.3  /  TBili  0.2  /  DBili  x   /  AST  32  /  ALT  17  /  AlkPhos  77  01-14    LIVER FUNCTIONS - ( 14 Jan 2021 11:12 )  Alb: 4.3 g/dL / Pro: 7.5 g/dL / ALK PHOS: 77 U/L / ALT: 17 U/L / AST: 32 U/L / GGT: x           PT/INR - ( 14 Jan 2021 11:12 )   PT: 12.6 sec;   INR: 1.10 ratio         PTT - ( 14 Jan 2021 11:12 )  PTT:33.8 sec    Amylase Serum--      Lipase serum14       Ammonia--                          14.3   10.54 )-----------( 155      ( 14 Jan 2021 11:12 )             43.0       Imaging:           Chief Complaint:  Patient is a 55y old  Male who presents with a chief complaint of abdominal distension    HPI:  Mr. Olivera is a 56yo M with PMH of MR (non verbal at baseline) who presents with abdominal pain. GI consulted for volvulus.    Per aid patient was seem to be more uncomfortable for the past 2days, with worsening abdominal distention for 1 day. Patient is constipated at baseline, last BM was this morning. Denies fevers, chills, cough, diarrhea, hematuria, history of surgeries. No similar episodes in the past.  Patient reportedly ate the morning of admission (but spit out some food).    In the ED: VSS, CT abdomen done, GI consulted.     Allergies:  No Known Allergies      Home Medications:  MiraLax oral powder for reconstitution: 1 packet(s) orally once a day (24 Mar 2020 17:54)  Multiple Vitamins oral tablet: 1 tab(s) orally once a day (21 Mar 2020 02:01)    Hospital Medications:      PMHX/PSHX:  Seizure disorder    Autism disorder    No significant past surgical history        Family history:  No pertinent family history in first degree relatives        Denies family history of colon cancer/polyps, stomach cancer/polyps, pancreatic cancer/masses, liver cancer/disease, ovarian cancer and endometrial cancer.    Social History:     Tob: Denies  EtOH: Denies  Illicit Drugs: Denies    ROS:     General:  No wt loss, fevers, chills, night sweats, fatigue  Eyes:  Good vision, no reported pain  ENT:  No sore throat, pain, runny nose, dysphagia  CV:  No pain, palpitations, hypo/hypertension  Pulm:  No dyspnea, cough, tachypnea, wheezing  GI:  No pain, No nausea, No vomiting, No diarrhea, No constipation, No weight loss, No fever, No pruritis, No rectal bleeding, No tarry stools, No dysphagia,  :  No pain, bleeding, incontinence, nocturia  Muscle:  No pain, weakness  Neuro:  No weakness, tingling, memory problems  Psych:  No fatigue, insomnia, mood problems, depression  Endocrine:  No polyuria, polydipsia, cold/heat intolerance  Heme:  No petechiae, ecchymosis, easy bruisability  Skin:  No rash, tattoos, scars, edema    PHYSICAL EXAM:     GENERAL:  No acute distress  HEENT:  Normocephalic/atraumatic, no scleral icterus  CHEST:  Clear to auscultation bilaterally, no wheezes/rales/ronchi, no accessory muscle use  HEART:  Regular rate and rhythm, no murmurs/rubs/gallops  ABDOMEN:  Soft, non-tender, non-distended, normoactive bowel sounds,  no masses, no hepato-splenomegaly, no signs of chronic liver disease  EXTREMITIES: No cyanosis, clubbing, or edema  SKIN:  No rash/erythema/ecchymoses/petechiae/wounds/abscess/warm/dry  NEURO:  Alert and oriented x 3, no asterixis    Vital Signs:  Vital Signs Last 24 Hrs  T(C): 36.7 (14 Jan 2021 09:51), Max: 36.7 (14 Jan 2021 09:51)  T(F): 98 (14 Jan 2021 09:51), Max: 98 (14 Jan 2021 09:51)  HR: 114 (14 Jan 2021 10:51) (114 - 128)  BP: 139/96 (14 Jan 2021 10:51) (123/83 - 139/96)  BP(mean): --  RR: 20 (14 Jan 2021 10:51) (18 - 20)  SpO2: 99% (14 Jan 2021 10:51) (99% - 99%)  Daily     Daily     LABS:                        14.3   10.54 )-----------( 155      ( 14 Jan 2021 11:12 )             43.0     Mean Cell Volume: 90.9 fL (01-14-21 @ 11:12)    01-14    139  |  100  |  24<H>  ----------------------------<  289<H>  4.1   |  24  |  0.78    Ca    8.9      14 Jan 2021 11:12    TPro  7.5  /  Alb  4.3  /  TBili  0.2  /  DBili  x   /  AST  32  /  ALT  17  /  AlkPhos  77  01-14    LIVER FUNCTIONS - ( 14 Jan 2021 11:12 )  Alb: 4.3 g/dL / Pro: 7.5 g/dL / ALK PHOS: 77 U/L / ALT: 17 U/L / AST: 32 U/L / GGT: x           PT/INR - ( 14 Jan 2021 11:12 )   PT: 12.6 sec;   INR: 1.10 ratio         PTT - ( 14 Jan 2021 11:12 )  PTT:33.8 sec    Amylase Serum--      Lipase serum14       Ammonia--                          14.3   10.54 )-----------( 155      ( 14 Jan 2021 11:12 )             43.0       Imaging:

## 2021-01-14 NOTE — ED ADULT NURSE NOTE - CHIEF COMPLAINT QUOTE
Patient brought to ER by Staff from [facility] for c/o abdominal pain. His stomach is rigid and hard. Had an episode of vomiting times one. Pt is on a thickener diet when drinking. Pt has 2 satff members with him.

## 2021-01-14 NOTE — ED ADULT TRIAGE NOTE - CHIEF COMPLAINT QUOTE
Patient brought to ER by Staff from [facility] for c/o abdominal pain. His stomach is rigid and hard. Had an episode of vomiting times one. Pt is on a thickener diet when drinking. Pt has 2 satff members with him.

## 2021-01-14 NOTE — ED PROVIDER NOTE - OBJECTIVE STATEMENT
56y/o M w/ h/o MR (non verbal at baseline) p/w abdominal pain. Pt non verbal but with aid who knows pt well. States has been acting differently for the past 2d, noticed abdomen distended today and is grunting. Pt had meal this AM and spit it out en route to hospital. 1 BM this morning and urinary incontinence in the stretcher, normally able to urinate ok. Denies fevers, chills, cough, diarrhea, hematuria, history of surgeries.

## 2021-01-14 NOTE — H&P ADULT - HISTORY OF PRESENT ILLNESS
54y/o M w/ h/o MR (non verbal at baseline) p/w abdominal pain. Pt non verbal but with aid who knows pt well. States has been acting differently for the past 2d, noticed abdomen distended today and is grunting. Pt had meal this AM and spit it out en route to hospital. No episodes of vomiting. 1 BM this morning and urinary incontinence in the stretcher, normally able to urinate ok. Denies fevers, chills, cough, diarrhea, hematuria, history of surgeries.

## 2021-01-14 NOTE — H&P ADULT - NSHPLABSRESULTS_GEN_ALL_CORE
Labs:                        14.3   10.54 )-----------( 155      ( 14 Jan 2021 11:12 )             43.0     PT/INR - ( 14 Jan 2021 11:12 )   PT: 12.6 sec;   INR: 1.10 ratio         PTT - ( 14 Jan 2021 11:12 )  PTT:33.8 sec  01-14    139  |  100  |  24<H>  ----------------------------<  289<H>  4.1   |  24  |  0.78    Ca    8.9      14 Jan 2021 11:12    TPro  7.5  /  Alb  4.3  /  TBili  0.2  /  DBili  x   /  AST  32  /  ALT  17  /  AlkPhos  77  01-14            Imaging and other studies:  < from: CT Abdomen and Pelvis w/ IV Cont (01.14.21 @ 10:52) >      COMPARISON: None.    PROCEDURE:  CT of the Abdomen and Pelvis was performed with intravenous contrast.  Intravenous contrast: 90 ml Omnipaque 350. 10 ml discarded.  Oral contrast: None.  Sagittal and coronal reformats were performed.    FINDINGS:  LOWER CHEST: Coronary artery calcification.    LIVER: Within normal limits.  BILE DUCTS: Normal caliber.  GALLBLADDER: Within normal limits.  SPLEEN: Within normal limits.  PANCREAS: Within normal limits.  ADRENALS: Within normal limits.  KIDNEYS/URETERS: Subcentimeter hypodensities bilaterally that are too small to characterize.    BLADDER: Within normal limits.  REPRODUCTIVE ORGANS: The prostate is normal in size.    BOWEL: Markedly dilated and twisted loop of sigmoid colon in a "coffee bean" configuration with 2 adjacent points of transition in the sigmoid mesentery, compatible with sigmoid volvulus resulting in a large bowel obstruction. Upstream colon is dilated with the cecum measuring up to 11.1 cm. Associated mesenteric edema, but no pneumatosis. No small bowel distention. Normal appendix.  PERITONEUM: Mesenteric edema and trace pelvic ascites. No pneumoperitoneum.  VESSELS: Within normal limits. Patent portal veins.  RETROPERITONEUM/LYMPH NODES: No lymphadenopathy.  ABDOMINAL WALL: Within normal limits.  BONES: Degenerative changes.    IMPRESSION:  Sigmoid volvulus resulting in large bowel obstruction.    No pneumatosis or portal venous gas. However, given presence of trace pelvic ascites and mesenteric edema, ischemia cannot be excluded. Correlate clinically.

## 2021-01-14 NOTE — ED PROVIDER NOTE - PROGRESS NOTE DETAILS
José Miguel, PGY2: pt wheeled over to CT scan for concern for peritonitis. surg consulted agree with plan. Becky BRYSON: GI have been paged for consult

## 2021-01-14 NOTE — H&P ADULT - ASSESSMENT
54 y/o non verbal at baseline presenting with abd. distension found to have sigmoid volvulus     - admit to Dr. Boswell   - NPO   - IVF   - trend lactate   - serial abd. exams   - GI consulted- planned for decompression   - patients brother Sarah Veronica consents for patient [phone]     Discussed with attending Dr. Boswell   [id number] 54 y/o non verbal at baseline presenting with abd. distension found to have sigmoid volvulus     - admit to Dr. Boswell   - NPO   - IVF   - trend lactate   - serial abd. exams   - GI consulted- planned for decompression   - patients brother Sarah Veronica consents for patient [phone]      Discussed with attending Dr. Boswell   [id number]

## 2021-01-14 NOTE — H&P ADULT - NSHPPHYSICALEXAM_GEN_ALL_CORE
Physical Exam  T(C): 36.7  HR: 114 (114 - 128)  BP: 139/96 (123/83 - 139/96)  RR: 20 (18 - 20)  SpO2: 99% (99% - 99%)  Tmax: T(C): , Max: 36.7 (01-14-21 @ 09:51)    General: well developed, well nourished, NAD  Neuro: alert and oriented, no focal deficits, moves all extremities spontaneously  HEENT: NCAT, EOMI, anicteric, mucosa moist  Respiratory: airway patent, respirations unlabored  CVS: regular rate and rhythm  Abdomen: soft, distended   Extremities: no edema, sensation and movement grossly intact  Skin: warm, dry, appropriate color

## 2021-01-14 NOTE — ED ADULT NURSE NOTE - NSIMPLEMENTINTERV_GEN_ALL_ED
Implemented All Fall Risk Interventions:  [place] to call system. Call bell, personal items and telephone within reach. Instruct patient to call for assistance. Room bathroom lighting operational. Non-slip footwear when patient is off stretcher. Physically safe environment: no spills, clutter or unnecessary equipment. Stretcher in lowest position, wheels locked, appropriate side rails in place. Provide visual cue, wrist band, yellow gown, etc. Monitor gait and stability. Monitor for mental status changes and reorient to person, place, and time. Review medications for side effects contributing to fall risk. Reinforce activity limits and safety measures with patient and family.

## 2021-01-14 NOTE — CONSULT NOTE ADULT - ASSESSMENT
likely sigmoid volvulus  possible ischemia    Rec:  1. await covid swab to see if he could be brought to endoscopy suite  2. will need urgent decompression flex sig  3. Surgery consultation for redundant sigmoid/pexy/ischemia

## 2021-01-15 LAB
ANION GAP SERPL CALC-SCNC: 6 MMOL/L — LOW (ref 7–14)
ANION GAP SERPL CALC-SCNC: 8 MMOL/L — SIGNIFICANT CHANGE UP (ref 7–14)
ANION GAP SERPL CALC-SCNC: 8 MMOL/L — SIGNIFICANT CHANGE UP (ref 7–14)
BUN SERPL-MCNC: 12 MG/DL — SIGNIFICANT CHANGE UP (ref 7–23)
BUN SERPL-MCNC: 14 MG/DL — SIGNIFICANT CHANGE UP (ref 7–23)
BUN SERPL-MCNC: 14 MG/DL — SIGNIFICANT CHANGE UP (ref 7–23)
CALCIUM SERPL-MCNC: 8 MG/DL — LOW (ref 8.4–10.5)
CALCIUM SERPL-MCNC: 8 MG/DL — LOW (ref 8.4–10.5)
CALCIUM SERPL-MCNC: 8.1 MG/DL — LOW (ref 8.4–10.5)
CHLORIDE SERPL-SCNC: 105 MMOL/L — SIGNIFICANT CHANGE UP (ref 98–107)
CHLORIDE SERPL-SCNC: 105 MMOL/L — SIGNIFICANT CHANGE UP (ref 98–107)
CHLORIDE SERPL-SCNC: 106 MMOL/L — SIGNIFICANT CHANGE UP (ref 98–107)
CO2 SERPL-SCNC: 28 MMOL/L — SIGNIFICANT CHANGE UP (ref 22–31)
CREAT SERPL-MCNC: 0.71 MG/DL — SIGNIFICANT CHANGE UP (ref 0.5–1.3)
CREAT SERPL-MCNC: 0.72 MG/DL — SIGNIFICANT CHANGE UP (ref 0.5–1.3)
CREAT SERPL-MCNC: 0.73 MG/DL — SIGNIFICANT CHANGE UP (ref 0.5–1.3)
GLUCOSE SERPL-MCNC: 77 MG/DL — SIGNIFICANT CHANGE UP (ref 70–99)
GLUCOSE SERPL-MCNC: 78 MG/DL — SIGNIFICANT CHANGE UP (ref 70–99)
GLUCOSE SERPL-MCNC: 97 MG/DL — SIGNIFICANT CHANGE UP (ref 70–99)
HCT VFR BLD CALC: 36.6 % — LOW (ref 39–50)
HGB BLD-MCNC: 11.7 G/DL — LOW (ref 13–17)
MAGNESIUM SERPL-MCNC: 2.3 MG/DL — SIGNIFICANT CHANGE UP (ref 1.6–2.6)
MAGNESIUM SERPL-MCNC: 2.4 MG/DL — SIGNIFICANT CHANGE UP (ref 1.6–2.6)
MAGNESIUM SERPL-MCNC: 2.4 MG/DL — SIGNIFICANT CHANGE UP (ref 1.6–2.6)
MCHC RBC-ENTMCNC: 30.4 PG — SIGNIFICANT CHANGE UP (ref 27–34)
MCHC RBC-ENTMCNC: 32 GM/DL — SIGNIFICANT CHANGE UP (ref 32–36)
MCV RBC AUTO: 95.1 FL — SIGNIFICANT CHANGE UP (ref 80–100)
NRBC # BLD: 0 /100 WBCS — SIGNIFICANT CHANGE UP
NRBC # FLD: 0 K/UL — SIGNIFICANT CHANGE UP
PHOSPHATE SERPL-MCNC: 2 MG/DL — LOW (ref 2.5–4.5)
PHOSPHATE SERPL-MCNC: 2.5 MG/DL — SIGNIFICANT CHANGE UP (ref 2.5–4.5)
PHOSPHATE SERPL-MCNC: 5.5 MG/DL — HIGH (ref 2.5–4.5)
PLATELET # BLD AUTO: 111 K/UL — LOW (ref 150–400)
POTASSIUM SERPL-MCNC: 3.1 MMOL/L — LOW (ref 3.5–5.3)
POTASSIUM SERPL-MCNC: 3.6 MMOL/L — SIGNIFICANT CHANGE UP (ref 3.5–5.3)
POTASSIUM SERPL-MCNC: 3.6 MMOL/L — SIGNIFICANT CHANGE UP (ref 3.5–5.3)
POTASSIUM SERPL-SCNC: 3.1 MMOL/L — LOW (ref 3.5–5.3)
POTASSIUM SERPL-SCNC: 3.6 MMOL/L — SIGNIFICANT CHANGE UP (ref 3.5–5.3)
POTASSIUM SERPL-SCNC: 3.6 MMOL/L — SIGNIFICANT CHANGE UP (ref 3.5–5.3)
RBC # BLD: 3.85 M/UL — LOW (ref 4.2–5.8)
RBC # FLD: 13.1 % — SIGNIFICANT CHANGE UP (ref 10.3–14.5)
SODIUM SERPL-SCNC: 140 MMOL/L — SIGNIFICANT CHANGE UP (ref 135–145)
SODIUM SERPL-SCNC: 141 MMOL/L — SIGNIFICANT CHANGE UP (ref 135–145)
SODIUM SERPL-SCNC: 141 MMOL/L — SIGNIFICANT CHANGE UP (ref 135–145)
WBC # BLD: 8.79 K/UL — SIGNIFICANT CHANGE UP (ref 3.8–10.5)
WBC # FLD AUTO: 8.79 K/UL — SIGNIFICANT CHANGE UP (ref 3.8–10.5)

## 2021-01-15 PROCEDURE — 74018 RADEX ABDOMEN 1 VIEW: CPT | Mod: 26

## 2021-01-15 PROCEDURE — 99232 SBSQ HOSP IP/OBS MODERATE 35: CPT

## 2021-01-15 RX ORDER — POLYETHYLENE GLYCOL 3350 17 G/17G
17 POWDER, FOR SOLUTION ORAL
Refills: 0 | Status: DISCONTINUED | OUTPATIENT
Start: 2021-01-15 | End: 2021-01-15

## 2021-01-15 RX ORDER — METRONIDAZOLE 500 MG
250 TABLET ORAL
Refills: 0 | Status: DISCONTINUED | OUTPATIENT
Start: 2021-01-15 | End: 2021-01-16

## 2021-01-15 RX ORDER — CIPROFLOXACIN LACTATE 400MG/40ML
250 VIAL (ML) INTRAVENOUS ONCE
Refills: 0 | Status: COMPLETED | OUTPATIENT
Start: 2021-01-15 | End: 2021-01-15

## 2021-01-15 RX ORDER — POTASSIUM PHOSPHATE, MONOBASIC POTASSIUM PHOSPHATE, DIBASIC 236; 224 MG/ML; MG/ML
30 INJECTION, SOLUTION INTRAVENOUS ONCE
Refills: 0 | Status: COMPLETED | OUTPATIENT
Start: 2021-01-15 | End: 2021-01-16

## 2021-01-15 RX ORDER — POTASSIUM CHLORIDE 20 MEQ
20 PACKET (EA) ORAL
Refills: 0 | Status: DISCONTINUED | OUTPATIENT
Start: 2021-01-15 | End: 2021-01-15

## 2021-01-15 RX ORDER — POTASSIUM CHLORIDE 20 MEQ
10 PACKET (EA) ORAL
Refills: 0 | Status: COMPLETED | OUTPATIENT
Start: 2021-01-15 | End: 2021-01-16

## 2021-01-15 RX ORDER — DEXTROSE MONOHYDRATE, SODIUM CHLORIDE, AND POTASSIUM CHLORIDE 50; .745; 4.5 G/1000ML; G/1000ML; G/1000ML
1000 INJECTION, SOLUTION INTRAVENOUS
Refills: 0 | Status: DISCONTINUED | OUTPATIENT
Start: 2021-01-15 | End: 2021-01-18

## 2021-01-15 RX ORDER — POLYETHYLENE GLYCOL 3350 17 G/17G
17 POWDER, FOR SOLUTION ORAL
Refills: 0 | Status: DISCONTINUED | OUTPATIENT
Start: 2021-01-15 | End: 2021-01-16

## 2021-01-15 RX ORDER — METRONIDAZOLE 500 MG
250 TABLET ORAL ONCE
Refills: 0 | Status: COMPLETED | OUTPATIENT
Start: 2021-01-15 | End: 2021-01-15

## 2021-01-15 RX ORDER — CIPROFLOXACIN LACTATE 400MG/40ML
250 VIAL (ML) INTRAVENOUS
Refills: 0 | Status: DISCONTINUED | OUTPATIENT
Start: 2021-01-15 | End: 2021-01-16

## 2021-01-15 RX ADMIN — ENOXAPARIN SODIUM 40 MILLIGRAM(S): 100 INJECTION SUBCUTANEOUS at 13:31

## 2021-01-15 RX ADMIN — POLYETHYLENE GLYCOL 3350 17 GRAM(S): 17 POWDER, FOR SOLUTION ORAL at 19:18

## 2021-01-15 RX ADMIN — POLYETHYLENE GLYCOL 3350 17 GRAM(S): 17 POWDER, FOR SOLUTION ORAL at 22:16

## 2021-01-15 RX ADMIN — Medication 100 MILLIEQUIVALENT(S): at 22:16

## 2021-01-15 RX ADMIN — DEXTROSE MONOHYDRATE, SODIUM CHLORIDE, AND POTASSIUM CHLORIDE 125 MILLILITER(S): 50; .745; 4.5 INJECTION, SOLUTION INTRAVENOUS at 21:44

## 2021-01-15 RX ADMIN — Medication 250 MILLIGRAM(S): at 13:10

## 2021-01-15 RX ADMIN — Medication 0.5 MILLIGRAM(S): at 21:44

## 2021-01-15 RX ADMIN — TAMSULOSIN HYDROCHLORIDE 0.4 MILLIGRAM(S): 0.4 CAPSULE ORAL at 22:16

## 2021-01-15 RX ADMIN — POLYETHYLENE GLYCOL 3350 17 GRAM(S): 17 POWDER, FOR SOLUTION ORAL at 21:44

## 2021-01-15 RX ADMIN — POLYETHYLENE GLYCOL 3350 17 GRAM(S): 17 POWDER, FOR SOLUTION ORAL at 18:20

## 2021-01-15 RX ADMIN — DEXTROSE MONOHYDRATE, SODIUM CHLORIDE, AND POTASSIUM CHLORIDE 125 MILLILITER(S): 50; .745; 4.5 INJECTION, SOLUTION INTRAVENOUS at 16:20

## 2021-01-15 RX ADMIN — Medication 0.5 MILLIGRAM(S): at 13:31

## 2021-01-15 RX ADMIN — Medication 250 MILLIGRAM(S): at 21:44

## 2021-01-15 RX ADMIN — POLYETHYLENE GLYCOL 3350 17 GRAM(S): 17 POWDER, FOR SOLUTION ORAL at 16:17

## 2021-01-15 RX ADMIN — Medication 250 MILLIGRAM(S): at 16:17

## 2021-01-15 RX ADMIN — Medication 100 MILLIEQUIVALENT(S): at 23:47

## 2021-01-15 RX ADMIN — Medication 650 MILLIGRAM(S): at 01:26

## 2021-01-15 RX ADMIN — Medication 25 MILLIGRAM(S): at 13:50

## 2021-01-15 RX ADMIN — Medication 100 MILLIGRAM(S): at 22:16

## 2021-01-15 NOTE — PROGRESS NOTE ADULT - SUBJECTIVE AND OBJECTIVE BOX
B TEAM SURGERY    Patient seen and examined at bedside. Patient nonverbal, appears to be resting comfortably.    VITALS  T(C): 37.1 (01-15-21 @ 05:20), Max: 37.2 (01-14-21 @ 21:00)  HR: 98 (01-15-21 @ 05:20) (98 - 128)  BP: 123/67 (01-15-21 @ 05:20) (116/86 - 139/96)  BP(mean): 106 (01-14-21 @ 19:30) (66 - 106)  ABP: --  ABP(mean): --  RR: 16 (01-15-21 @ 05:20) (16 - 20)  SpO2: 98% (01-15-21 @ 05:20) (97% - 100%)  Wt(kg): --  CVP(mm Hg): --  CI: --  CAPILLARY BLOOD GLUCOSE      POCT Blood Glucose.: 111 mg/dL (14 Jan 2021 14:34)   N/A    EXAM  Gen: NAD  Abd: softly distended, NT      LABS  CBC (01-14 @ 15:20)                              13.3                           13.09<H>  )----------------(  146<L>     --    % Neutrophils, --    % Lymphocytes, ANC: --                                  40.0    CBC (01-14 @ 11:12)                              14.3                           10.54<H>  )----------------(  155        91.2<H>% Neutrophils, 4.0<L>% Lymphocytes, ANC: 9.62<H>                              43.0      BMP (01-14 @ 15:20)             141     |  106     |  21    		Ca++ --      Ca 8.1<L>             ---------------------------------( 106<H>		Mg 2.7<H>             3.1<L>  |  25      |  0.71  			Ph 2.0<L>  BMP (01-14 @ 11:12)             139     |  100     |  24<H> 		Ca++ --      Ca 8.9                ---------------------------------( 289<H>		Mg --                 4.1     |  24      |  0.78  			Ph --        LFTs (01-14 @ 11:12)      TPro 7.5 / Alb 4.3 / TBili 0.2 / DBili -- / AST 32 / ALT 17 / AlkPhos 77    Coags (01-14 @ 11:12)  aPTT 33.8 / INR 1.10 / PT 12.6      ABG (01-14 @ 15:20)      /  /  /  /  / %     Lactate:  0.8   ABG (01-14 @ 14:19)      /  /  /  /  / %     Lactate:   0.9    VBG (01-14 @ 14:19)     7.39 / 46 / 68<H> / 26 / 2.8<H> / 92.8<H>%  VBG (01-14 @ 11:41)     7.40 / 45 / 66<H> / 27 / 3.1<H> / 92.9<H>%      A/P 55M with a sigmoid volvulus s/p GI decompression  - LR @125, consider changing to maintenance IVF  - DVT ppx with lovenox  - NPO x meds  - continue home meds  possible surgery for bowel resection next week    [id number]

## 2021-01-15 NOTE — PROGRESS NOTE ADULT - SUBJECTIVE AND OBJECTIVE BOX
Chief Complaint:  Patient is a 55y old  Male who presents with a chief complaint of sigmoid volvulus (15 Dmitry 2021 07:37)      Interval Events:   - Pt with abd much improved today compared to yesterday, soft   - Rectal tube in place in AM     Allergies:  No Known Allergies        Hospital Medications:  acetaminophen   Tablet .. 650 milliGRAM(s) Oral every 6 hours PRN  benztropine 0.5 milliGRAM(s) Oral two times a day  chlorproMAZINE    Tablet 100 milliGRAM(s) Oral at bedtime  chlorproMAZINE    Tablet 25 milliGRAM(s) Oral daily  ciprofloxacin     Tablet 250 milliGRAM(s) Oral <User Schedule>  ciprofloxacin     Tablet 250 milliGRAM(s) Oral once  enoxaparin Injectable 40 milliGRAM(s) SubCutaneous daily  lactated ringers. 1000 milliLiter(s) IV Continuous <Continuous>  metroNIDAZOLE    Tablet 250 milliGRAM(s) Oral once  metroNIDAZOLE    Tablet 250 milliGRAM(s) Oral <User Schedule>  polyethylene glycol 3350 17 Gram(s) Oral <User Schedule>  tamsulosin 0.4 milliGRAM(s) Oral at bedtime      PMHX/PSHX:  Seizure disorder    Autism disorder    No significant past surgical history        Family history:  No pertinent family history in first degree relatives        ROS:     General:  No wt loss, fevers, chills, night sweats, fatigue,   Eyes:  Good vision, no reported pain  ENT:  No sore throat, pain, runny nose, dysphagia  CV:  No pain, palpitations, hypo/hypertension  Pulm:  No dyspnea, cough, tachypnea, wheezing  GI:  No pain, No nausea, No vomiting, No diarrhea, No constipation, No weight loss, No fever, No pruritis, No rectal bleeding, No tarry stools, No dysphagia  :  No pain, bleeding, incontinence, nocturia  Muscle:  No pain, weakness  Neuro:  No weakness, tingling, memory problems  Psych:  No fatigue, insomnia, mood problems, depression  Endocrine:  No polyuria, polydipsia, cold/heat intolerance  Heme:  No petechiae, ecchymosis, easy bruisability  Skin:  No rash, tattoos, scars, edema      PHYSICAL EXAM:   Vital Signs:  Vital Signs Last 24 Hrs  T(C): 37.1 (15 Dmitry 2021 05:20), Max: 37.2 (14 Jan 2021 21:00)  T(F): 98.8 (15 Dmitry 2021 05:20), Max: 98.9 (14 Jan 2021 21:00)  HR: 98 (15 Dmitry 2021 05:20) (98 - 112)  BP: 123/67 (15 Dmitry 2021 05:20) (116/86 - 139/85)  BP(mean): 106 (14 Jan 2021 19:30) (66 - 106)  RR: 16 (15 Dmitry 2021 05:20) (16 - 17)  SpO2: 98% (15 Dmitry 2021 05:20) (97% - 100%)  Daily     Daily     GENERAL:  No acute distress, comfortably sleeping in bed, sleepy on exam, arousable   HEENT:  Normocephalic/atraumatic, no scleral icterus  CHEST:  Clear to auscultation bilaterally, no wheezes/rales/ronchi, no accessory muscle use  HEART:  Regular rate and rhythm, no murmurs/rubs/gallops  ABDOMEN:  Soft, non-tender, non-distended, normoactive bowel sounds, +rectal tube  EXTREMITIES: No cyanosis, clubbing, or edema  SKIN:  No rash/warm/dry  NEURO:  Alert and oriented x 0, non verbal at baseline, tracks    LABS:                        11.7   8.79  )-----------( 111      ( 15 Dmitry 2021 07:34 )             36.6     Mean Cell Volume: 95.1 fL (01-15-21 @ 07:34)    01-15    141  |  105  |  14  ----------------------------<  78  3.6   |  28  |  0.73    Ca    8.1<L>      15 Dmitry 2021 07:34  Phos  5.5     01-15  Mg     2.4     01-15    TPro  7.5  /  Alb  4.3  /  TBili  0.2  /  DBili  x   /  AST  32  /  ALT  17  /  AlkPhos  77  01-14    LIVER FUNCTIONS - ( 14 Jan 2021 11:12 )  Alb: 4.3 g/dL / Pro: 7.5 g/dL / ALK PHOS: 77 U/L / ALT: 17 U/L / AST: 32 U/L / GGT: x           PT/INR - ( 14 Jan 2021 11:12 )   PT: 12.6 sec;   INR: 1.10 ratio         PTT - ( 14 Jan 2021 11:12 )  PTT:33.8 sec                            11.7   8.79  )-----------( 111      ( 15 Dmitry 2021 07:34 )             36.6                         13.3   13.09 )-----------( 146      ( 14 Jan 2021 15:20 )             40.0                         14.3   10.54 )-----------( 155      ( 14 Jan 2021 11:12 )             43.0       Imaging:

## 2021-01-15 NOTE — PATIENT PROFILE ADULT - PRO INTERPRETER NEED 2
INTERNAL MEDICINE OFFICE VISIT    Erika Bonilla is a 44 year old adult who presents today for   Chief Complaint   Patient presents with   • Follow-up     Gerd- needs Pantoprazole refills. Also c/o Anxiety.    • Foot Pain     Right foot above ankle        SUBJECTIVE:   HISTORY OF PRESENT ILLNESS:  Video visit during COVID-19 pandemic  Patient complains of acid reflux for past few weeks and she is running out of pantoprazole which she was taking as and when necessary.  She has modified her diet and tries not to eat fried and greasy food especially for dinner.  She denies chest pain, dysphagia, regurgitation of food in her mouth but she continues to have frequent burping and at times it comes in bouts.  She has pain in her right foot especially back of the foot intermittently.  This pain that she describes is not very typical of plantar fasciitis but she believes that she has a heel spur and pain is coming from the heel spur.  Additionally, she has pain in the lower third of her right leg she has no rash and she has no tenderness, she has no edema but she feels pain in in the right lateral lower leg and this is not related to posture.  She denies back pain numbness or weakness of lower extremities.  Besides intermittent use of pantoprazole she takes Biotene multivitamin and vitamin D fish oil etc.  She has not been exercising on a regular basis.  She was losing weight for month of January or so but after this lockdown she has not been able to exercise and has not been able to lose weight.  She does weigh herself and claimed that her weight has been stable.    PAST MEDICAL HISTORY:  Past Medical History:   Diagnosis Date   • GERD (gastroesophageal reflux disease)    • No known problems        PAST SURGICAL HISTORY:  Past Surgical History:   Procedure Laterality Date   • Cholecystectomy         FAMILY HISTORY:  Family History   Problem Relation Age of Onset   • Diabetes Mother    • Stomach Cancer Mother    • Diabetes  Father    • Diabetes Brother    • Cancer, Breast Paternal Grandmother        SOCIAL HISTORY:  Social History     Tobacco Use   • Smoking status: Never Smoker   • Smokeless tobacco: Never Used   Substance Use Topics   • Alcohol use: Yes     Comment: occasional    • Drug use: Not on file       ALLERGIES:  ALLERGIES:  No Known Allergies    MEDICATIONS:  Current Outpatient Medications   Medication Sig Dispense Refill   • pantoprazole (PROTONIX) 40 MG tablet Take 1 tablet by mouth daily. 30 tablet 1   • fluocinonide (LIDEX) 0.05 % cream APPLY TO AFFECTED AREA TWICE A DAY 60 g 0   • Lactobacillus-Inulin ([facility] D[facility]) capsule      • Cetirizine HCl (ZYRTEC ALLERGY) 10 MG Cap        No current facility-administered medications for this visit.        Review of Systems     OBJECTIVE:   There were no vitals taken for this visit.    Physical Exam    DIAGNOSTIC STUDIES:   LAB RESULTS:    No results found for this visit on 05/12/20.    ASSESSMENT AND PLAN:     Problem List Items Addressed This Visit        Behavioral    Anxiety - Primary       Respiratory    Non-seasonal allergic rhinitis       Digestive    Gastroesophageal reflux disease without esophagitis    Relevant Medications    pantoprazole (PROTONIX) 40 MG tablet       Musculoskeletal    Lumbar radiculopathy      Dietary counseling was done to reduce GERD symptoms and she was advised to take pantoprazole 40 mg at bedtime for 2 weeks and subsequently she may take it as and when necessary.  Exercises to prevent plantar fasciitis were discussed and we are planning to email her exercises that she should do to prevent plantar fasciitis pain.  Additionally how to treat once she has a pain in her foot was also explained to her.  I believe her pain in the right lower leg is probably manifestation of lumbar radiculopathy.  Back strengthening exercises and weight reduction will help reduce the symptoms and we will mail her leaflets explaining what exercises to do to  strengthen back muscles.  She should practice mindfulness and meditation to relieve her anxiety.  Obviously, exercise on a regular basis would also help reduce anxiety.  Weight reduction will not only help lumbar radiculopathy but also help sleep apnea which I believe she has.  Her labs were reviewed.  She was advised to have reevaluation in person in 4 weeks.    No follow-ups on file.    Instructions provided as documented in the AVS.    Greater than 50% of the visit was spent counseling regarding above issues; total time spent 25 minutes.     This transcript is produced with the help of voice recognition system.  Spelling and grammar may not be accurate due to problem with voice recognition system.  I have signed it without reading.   The patient indicated understanding of the diagnosis, agreed with the plan of care and agreed to call or return with any new or worsening symptoms.    Jay Salas MD  5/12/2020    English

## 2021-01-15 NOTE — PROGRESS NOTE ADULT - ASSESSMENT
54yo M with PMH of MR (non verbal at baseline) who presents with abdominal pain. GI consulted for volvulus.    IMPRESSION:   #Sigmoid volvulus: s/p decompression with rectal tube successful on 1/14/21. Now clinically improving with soft abd.        RECOMMENDATIONS:   - Serial abd exams   - Maintain rectal tube for decompression   - Please call GI back as needed      Thank you for involving us in the care of this patient, please reach out if any further questions.     Dillan Mariscal MD  Gastroenterology Fellow, PGY4    Available on [employer] Teams  [phone] ([facility])  [phone] ([facility])  Please contact on call fellow weekdays after 5pm-7am and weekends: [phone]

## 2021-01-16 LAB
ANION GAP SERPL CALC-SCNC: 6 MMOL/L — LOW (ref 7–14)
BUN SERPL-MCNC: 10 MG/DL — SIGNIFICANT CHANGE UP (ref 7–23)
CALCIUM SERPL-MCNC: 7.8 MG/DL — LOW (ref 8.4–10.5)
CHLORIDE SERPL-SCNC: 106 MMOL/L — SIGNIFICANT CHANGE UP (ref 98–107)
CO2 SERPL-SCNC: 27 MMOL/L — SIGNIFICANT CHANGE UP (ref 22–31)
CREAT SERPL-MCNC: 0.67 MG/DL — SIGNIFICANT CHANGE UP (ref 0.5–1.3)
GLUCOSE SERPL-MCNC: 88 MG/DL — SIGNIFICANT CHANGE UP (ref 70–99)
HCT VFR BLD CALC: 34.6 % — LOW (ref 39–50)
HGB BLD-MCNC: 11.1 G/DL — LOW (ref 13–17)
MAGNESIUM SERPL-MCNC: 2.3 MG/DL — SIGNIFICANT CHANGE UP (ref 1.6–2.6)
MCHC RBC-ENTMCNC: 29.9 PG — SIGNIFICANT CHANGE UP (ref 27–34)
MCHC RBC-ENTMCNC: 32.1 GM/DL — SIGNIFICANT CHANGE UP (ref 32–36)
MCV RBC AUTO: 93.3 FL — SIGNIFICANT CHANGE UP (ref 80–100)
NRBC # BLD: 0 /100 WBCS — SIGNIFICANT CHANGE UP
NRBC # FLD: 0 K/UL — SIGNIFICANT CHANGE UP
PHOSPHATE SERPL-MCNC: 3.8 MG/DL — SIGNIFICANT CHANGE UP (ref 2.5–4.5)
PLATELET # BLD AUTO: 102 K/UL — LOW (ref 150–400)
POTASSIUM SERPL-MCNC: 3.7 MMOL/L — SIGNIFICANT CHANGE UP (ref 3.5–5.3)
POTASSIUM SERPL-SCNC: 3.7 MMOL/L — SIGNIFICANT CHANGE UP (ref 3.5–5.3)
RBC # BLD: 3.71 M/UL — LOW (ref 4.2–5.8)
RBC # FLD: 12.8 % — SIGNIFICANT CHANGE UP (ref 10.3–14.5)
SODIUM SERPL-SCNC: 139 MMOL/L — SIGNIFICANT CHANGE UP (ref 135–145)
WBC # BLD: 4.29 K/UL — SIGNIFICANT CHANGE UP (ref 3.8–10.5)
WBC # FLD AUTO: 4.29 K/UL — SIGNIFICANT CHANGE UP (ref 3.8–10.5)

## 2021-01-16 PROCEDURE — 99231 SBSQ HOSP IP/OBS SF/LOW 25: CPT

## 2021-01-16 RX ADMIN — Medication 100 MILLIEQUIVALENT(S): at 00:53

## 2021-01-16 RX ADMIN — DEXTROSE MONOHYDRATE, SODIUM CHLORIDE, AND POTASSIUM CHLORIDE 125 MILLILITER(S): 50; .745; 4.5 INJECTION, SOLUTION INTRAVENOUS at 21:52

## 2021-01-16 RX ADMIN — Medication 100 MILLIGRAM(S): at 21:49

## 2021-01-16 RX ADMIN — Medication 0.5 MILLIGRAM(S): at 21:49

## 2021-01-16 RX ADMIN — POTASSIUM PHOSPHATE, MONOBASIC POTASSIUM PHOSPHATE, DIBASIC 83.33 MILLIMOLE(S): 236; 224 INJECTION, SOLUTION INTRAVENOUS at 02:37

## 2021-01-16 RX ADMIN — Medication 25 MILLIGRAM(S): at 12:50

## 2021-01-16 RX ADMIN — ENOXAPARIN SODIUM 40 MILLIGRAM(S): 100 INJECTION SUBCUTANEOUS at 12:50

## 2021-01-16 RX ADMIN — TAMSULOSIN HYDROCHLORIDE 0.4 MILLIGRAM(S): 0.4 CAPSULE ORAL at 21:49

## 2021-01-16 RX ADMIN — Medication 0.5 MILLIGRAM(S): at 12:50

## 2021-01-16 RX ADMIN — DEXTROSE MONOHYDRATE, SODIUM CHLORIDE, AND POTASSIUM CHLORIDE 125 MILLILITER(S): 50; .745; 4.5 INJECTION, SOLUTION INTRAVENOUS at 12:50

## 2021-01-16 NOTE — PROGRESS NOTE ADULT - SUBJECTIVE AND OBJECTIVE BOX
SURGERY PROGRESS NOTE    Subjective:   Patient seen and examined at bedside. Patient nonverbal, appears to be resting comfortably.    Objective:  Vital Signs  T(C): 37.1 (01-16 @ 09:25), Max: 37.1 (01-16 @ 09:25)  HR: 79 (01-16 @ 09:25) (79 - 96)  BP: 111/72 (01-16 @ 09:25) (103/72 - 121/79)  RR: 16 (01-16 @ 09:25) (16 - 18)  SpO2: 98% (01-16 @ 09:25) (97% - 99%)  01-15-21 @ 07:01  -  01-16-21 @ 07:00  --------------------------------------------------------  IN:  Total IN: 0 mL    OUT:    Incontinent per Collection Bag (mL): 1250 mL  Total OUT: 1250 mL    Total NET: -1250 mL      01-16-21 @ 07:01  -  01-16-21 @ 10:20  --------------------------------------------------------  IN:  Total IN: 0 mL    OUT:    Incontinent per Collection Bag (mL): 400 mL  Total OUT: 400 mL    Total NET: -400 mL      Physical Exam:  General: alert and oriented, NAD  Resp: respirations unlabored  Abd: softly distended, NT    Labs:                        11.1   4.29  )-----------( 102      ( 16 Jan 2021 05:09 )             34.6   01-16    139  |  106  |  10  ----------------------------<  88  3.7   |  27  |  0.67    Ca    7.8<L>      16 Jan 2021 05:09  Phos  3.8     01-16  Mg     2.3     01-16    TPro  7.5  /  Alb  4.3  /  TBili  0.2  /  DBili  x   /  AST  32  /  ALT  17  /  AlkPhos  77  01-14    CAPILLARY BLOOD GLUCOSE          Medications:   MEDICATIONS  (STANDING):  benztropine 0.5 milliGRAM(s) Oral two times a day  chlorproMAZINE    Tablet 100 milliGRAM(s) Oral at bedtime  chlorproMAZINE    Tablet 25 milliGRAM(s) Oral daily  dextrose 5% + sodium chloride 0.45% with potassium chloride 20 mEq/L 1000 milliLiter(s) (125 mL/Hr) IV Continuous <Continuous>  enoxaparin Injectable 40 milliGRAM(s) SubCutaneous daily  tamsulosin 0.4 milliGRAM(s) Oral at bedtime    MEDICATIONS  (PRN):  acetaminophen   Tablet .. 650 milliGRAM(s) Oral every 6 hours PRN Mild Pain (1 - 3)

## 2021-01-16 NOTE — PROGRESS NOTE ADULT - ASSESSMENT
Assessment:   55M with a sigmoid volvulus s/p GI decompression    - LR @125, consider changing to maintenance IVF  - DVT ppx with lovenox  - NPO x meds  - continue home meds  - possible surgery for bowel resection next week    Adolfo Bell, PGY-1  B Team Surgery [phone]

## 2021-01-17 ENCOUNTER — TRANSCRIPTION ENCOUNTER (OUTPATIENT)
Age: 56
End: 2021-01-17

## 2021-01-17 LAB
ANION GAP SERPL CALC-SCNC: 8 MMOL/L — SIGNIFICANT CHANGE UP (ref 7–14)
APTT BLD: 37.2 SEC — HIGH (ref 27–36.3)
BLD GP AB SCN SERPL QL: NEGATIVE — SIGNIFICANT CHANGE UP
BUN SERPL-MCNC: 7 MG/DL — SIGNIFICANT CHANGE UP (ref 7–23)
CALCIUM SERPL-MCNC: 8.5 MG/DL — SIGNIFICANT CHANGE UP (ref 8.4–10.5)
CHLORIDE SERPL-SCNC: 104 MMOL/L — SIGNIFICANT CHANGE UP (ref 98–107)
CO2 SERPL-SCNC: 27 MMOL/L — SIGNIFICANT CHANGE UP (ref 22–31)
CREAT SERPL-MCNC: 0.67 MG/DL — SIGNIFICANT CHANGE UP (ref 0.5–1.3)
GLUCOSE SERPL-MCNC: 105 MG/DL — HIGH (ref 70–99)
HCT VFR BLD CALC: 40.5 % — SIGNIFICANT CHANGE UP (ref 39–50)
HGB BLD-MCNC: 12.8 G/DL — LOW (ref 13–17)
INR BLD: 1.09 RATIO — SIGNIFICANT CHANGE UP (ref 0.88–1.16)
MAGNESIUM SERPL-MCNC: 2.3 MG/DL — SIGNIFICANT CHANGE UP (ref 1.6–2.6)
MCHC RBC-ENTMCNC: 29.6 PG — SIGNIFICANT CHANGE UP (ref 27–34)
MCHC RBC-ENTMCNC: 31.6 GM/DL — LOW (ref 32–36)
MCV RBC AUTO: 93.8 FL — SIGNIFICANT CHANGE UP (ref 80–100)
NRBC # BLD: 0 /100 WBCS — SIGNIFICANT CHANGE UP
NRBC # FLD: 0 K/UL — SIGNIFICANT CHANGE UP
PHOSPHATE SERPL-MCNC: 3.4 MG/DL — SIGNIFICANT CHANGE UP (ref 2.5–4.5)
PLATELET # BLD AUTO: 124 K/UL — LOW (ref 150–400)
POTASSIUM SERPL-MCNC: 3.6 MMOL/L — SIGNIFICANT CHANGE UP (ref 3.5–5.3)
POTASSIUM SERPL-SCNC: 3.6 MMOL/L — SIGNIFICANT CHANGE UP (ref 3.5–5.3)
PROTHROM AB SERPL-ACNC: 12.4 SEC — SIGNIFICANT CHANGE UP (ref 10.6–13.6)
RBC # BLD: 4.32 M/UL — SIGNIFICANT CHANGE UP (ref 4.2–5.8)
RBC # FLD: 12.5 % — SIGNIFICANT CHANGE UP (ref 10.3–14.5)
RH IG SCN BLD-IMP: POSITIVE — SIGNIFICANT CHANGE UP
SODIUM SERPL-SCNC: 139 MMOL/L — SIGNIFICANT CHANGE UP (ref 135–145)
WBC # BLD: 3.6 K/UL — LOW (ref 3.8–10.5)
WBC # FLD AUTO: 3.6 K/UL — LOW (ref 3.8–10.5)

## 2021-01-17 PROCEDURE — 99231 SBSQ HOSP IP/OBS SF/LOW 25: CPT | Mod: 57

## 2021-01-17 RX ORDER — POTASSIUM CHLORIDE 20 MEQ
10 PACKET (EA) ORAL
Refills: 0 | Status: COMPLETED | OUTPATIENT
Start: 2021-01-17 | End: 2021-01-17

## 2021-01-17 RX ADMIN — TAMSULOSIN HYDROCHLORIDE 0.4 MILLIGRAM(S): 0.4 CAPSULE ORAL at 22:09

## 2021-01-17 RX ADMIN — DEXTROSE MONOHYDRATE, SODIUM CHLORIDE, AND POTASSIUM CHLORIDE 125 MILLILITER(S): 50; .745; 4.5 INJECTION, SOLUTION INTRAVENOUS at 14:00

## 2021-01-17 RX ADMIN — Medication 25 MILLIGRAM(S): at 11:17

## 2021-01-17 RX ADMIN — Medication 0.5 MILLIGRAM(S): at 22:09

## 2021-01-17 RX ADMIN — Medication 100 MILLIEQUIVALENT(S): at 12:38

## 2021-01-17 RX ADMIN — Medication 100 MILLIGRAM(S): at 22:09

## 2021-01-17 RX ADMIN — Medication 100 MILLIEQUIVALENT(S): at 14:01

## 2021-01-17 RX ADMIN — Medication 650 MILLIGRAM(S): at 17:23

## 2021-01-17 RX ADMIN — Medication 0.5 MILLIGRAM(S): at 11:17

## 2021-01-17 RX ADMIN — Medication 100 MILLIEQUIVALENT(S): at 11:16

## 2021-01-17 RX ADMIN — DEXTROSE MONOHYDRATE, SODIUM CHLORIDE, AND POTASSIUM CHLORIDE 125 MILLILITER(S): 50; .745; 4.5 INJECTION, SOLUTION INTRAVENOUS at 11:18

## 2021-01-17 RX ADMIN — ENOXAPARIN SODIUM 40 MILLIGRAM(S): 100 INJECTION SUBCUTANEOUS at 11:18

## 2021-01-17 NOTE — PROGRESS NOTE ADULT - SUBJECTIVE AND OBJECTIVE BOX
Interval Events:  - No acute events overnight    S: Patient is nonverbal 2/2 developmental delay; doing OK;   Denies fevers, chills, nausea, emesis, chest pain, SOB.    O: Vital Signs  T(C): 36.7 (01-17 @ 05:39), Max: 36.9 (01-16 @ 22:07)  HR: 72 (01-17 @ 05:39) (72 - 81)  BP: 111/75 (01-17 @ 05:39) (105/70 - 115/74)  RR: 17 (01-17 @ 05:39) (17 - 17)  SpO2: 100% (01-17 @ 05:39) (96% - 100%)  01-16-21 @ 07:01  -  01-17-21 @ 07:00  --------------------------------------------------------  IN:  Total IN: 0 mL    OUT:    Incontinent per Collection Bag (mL): 3000 mL    Rectal Tube (mL): 0 mL  Total OUT: 3000 mL    Total NET: -3000 mL        General: developmental delay; NAD  Resp: airway patent, respirations unlabored  CVS: appears well perfused  Abdomen: soft, nontender, nondistended; incisions c/d/i  Extremities: no edema  Skin: warm, dry, appropriate color                          12.8   3.60  )-----------( 124      ( 17 Jan 2021 07:56 )             40.5   01-17    139  |  104  |  7   ----------------------------<  105<H>  3.6   |  27  |  0.67    Ca    8.5      17 Jan 2021 07:56  Phos  3.4     01-17  Mg     2.3     01-17

## 2021-01-17 NOTE — PROGRESS NOTE ADULT - ASSESSMENT
55M with a sigmoid volvulus s/p GI decompression    PLAN:  - D51/2NS @125  - DVT ppx: lovenox  - CLD; NPO @midnight  - C/w home meds  - OR tomorrow for sigmoidectomy      B Team Surgery [id number]

## 2021-01-18 ENCOUNTER — RESULT REVIEW (OUTPATIENT)
Age: 56
End: 2021-01-18

## 2021-01-18 LAB
BLD GP AB SCN SERPL QL: NEGATIVE — SIGNIFICANT CHANGE UP
RH IG SCN BLD-IMP: POSITIVE — SIGNIFICANT CHANGE UP

## 2021-01-18 PROCEDURE — 88307 TISSUE EXAM BY PATHOLOGIST: CPT | Mod: 26

## 2021-01-18 PROCEDURE — 44143 PARTIAL REMOVAL OF COLON: CPT | Mod: GC

## 2021-01-18 RX ORDER — ONDANSETRON 8 MG/1
4 TABLET, FILM COATED ORAL ONCE
Refills: 0 | Status: DISCONTINUED | OUTPATIENT
Start: 2021-01-18 | End: 2021-01-18

## 2021-01-18 RX ORDER — HYDROMORPHONE HYDROCHLORIDE 2 MG/ML
0.5 INJECTION INTRAMUSCULAR; INTRAVENOUS; SUBCUTANEOUS
Refills: 0 | Status: DISCONTINUED | OUTPATIENT
Start: 2021-01-18 | End: 2021-01-18

## 2021-01-18 RX ORDER — SODIUM CHLORIDE 9 MG/ML
1000 INJECTION, SOLUTION INTRAVENOUS
Refills: 0 | Status: DISCONTINUED | OUTPATIENT
Start: 2021-01-18 | End: 2021-01-19

## 2021-01-18 RX ORDER — FENTANYL CITRATE 50 UG/ML
25 INJECTION INTRAVENOUS
Refills: 0 | Status: DISCONTINUED | OUTPATIENT
Start: 2021-01-18 | End: 2021-01-18

## 2021-01-18 RX ORDER — HYDROMORPHONE HYDROCHLORIDE 2 MG/ML
1 INJECTION INTRAMUSCULAR; INTRAVENOUS; SUBCUTANEOUS
Refills: 0 | Status: DISCONTINUED | OUTPATIENT
Start: 2021-01-18 | End: 2021-01-18

## 2021-01-18 RX ORDER — ACETAMINOPHEN 500 MG
1000 TABLET ORAL EVERY 6 HOURS
Refills: 0 | Status: COMPLETED | OUTPATIENT
Start: 2021-01-18 | End: 2021-01-19

## 2021-01-18 RX ORDER — HYDROMORPHONE HYDROCHLORIDE 2 MG/ML
0.25 INJECTION INTRAMUSCULAR; INTRAVENOUS; SUBCUTANEOUS EVERY 4 HOURS
Refills: 0 | Status: DISCONTINUED | OUTPATIENT
Start: 2021-01-18 | End: 2021-01-19

## 2021-01-18 RX ORDER — OXYCODONE HYDROCHLORIDE 5 MG/1
5 TABLET ORAL EVERY 4 HOURS
Refills: 0 | Status: DISCONTINUED | OUTPATIENT
Start: 2021-01-18 | End: 2021-01-19

## 2021-01-18 RX ORDER — POTASSIUM CHLORIDE 20 MEQ
10 PACKET (EA) ORAL
Refills: 0 | Status: DISCONTINUED | OUTPATIENT
Start: 2021-01-18 | End: 2021-01-18

## 2021-01-18 RX ORDER — HYDROMORPHONE HYDROCHLORIDE 2 MG/ML
0.25 INJECTION INTRAMUSCULAR; INTRAVENOUS; SUBCUTANEOUS ONCE
Refills: 0 | Status: DISCONTINUED | OUTPATIENT
Start: 2021-01-18 | End: 2021-01-18

## 2021-01-18 RX ADMIN — Medication 100 MILLIGRAM(S): at 21:44

## 2021-01-18 RX ADMIN — ENOXAPARIN SODIUM 40 MILLIGRAM(S): 100 INJECTION SUBCUTANEOUS at 11:57

## 2021-01-18 RX ADMIN — HYDROMORPHONE HYDROCHLORIDE 0.5 MILLIGRAM(S): 2 INJECTION INTRAMUSCULAR; INTRAVENOUS; SUBCUTANEOUS at 16:57

## 2021-01-18 RX ADMIN — HYDROMORPHONE HYDROCHLORIDE 0.25 MILLIGRAM(S): 2 INJECTION INTRAMUSCULAR; INTRAVENOUS; SUBCUTANEOUS at 17:53

## 2021-01-18 RX ADMIN — Medication 0.5 MILLIGRAM(S): at 21:44

## 2021-01-18 RX ADMIN — Medication 400 MILLIGRAM(S): at 17:51

## 2021-01-18 RX ADMIN — OXYCODONE HYDROCHLORIDE 5 MILLIGRAM(S): 5 TABLET ORAL at 19:23

## 2021-01-18 RX ADMIN — HYDROMORPHONE HYDROCHLORIDE 0.25 MILLIGRAM(S): 2 INJECTION INTRAMUSCULAR; INTRAVENOUS; SUBCUTANEOUS at 17:33

## 2021-01-18 RX ADMIN — TAMSULOSIN HYDROCHLORIDE 0.4 MILLIGRAM(S): 0.4 CAPSULE ORAL at 21:44

## 2021-01-18 NOTE — PROGRESS NOTE ADULT - SUBJECTIVE AND OBJECTIVE BOX
GENERAL SURGERY PROGRESS NOTE    SUBJECTIVE  Patient seen and examined. No acute events overnight.  Denies fever, chills, SOB, chest pain.       OBJECTIVE    PHYSICAL EXAM  General: Appears well, NAD  CHEST: breathing comfortably  CV: appears well perfused  Abdomen: soft, nontender, nondistended, no rebound or guarding  Extremities: Grossly symmetric    T(C): 36.3 (01-18-21 @ 06:05), Max: 36.6 (01-17-21 @ 21:28)  HR: 70 (01-18-21 @ 06:05) (70 - 77)  BP: 97/54 (01-18-21 @ 06:05) (97/54 - 117/76)  RR: 17 (01-18-21 @ 06:05) (17 - 18)  SpO2: 98% (01-18-21 @ 06:05) (94% - 100%)    01-17-21 @ 07:01  -  01-18-21 @ 07:00  --------------------------------------------------------  IN: 1500 mL / OUT: 1101 mL / NET: 399 mL        MEDICATIONS  acetaminophen   Tablet .. 650 milliGRAM(s) Oral every 6 hours PRN  benztropine 0.5 milliGRAM(s) Oral two times a day  chlorproMAZINE    Tablet 100 milliGRAM(s) Oral at bedtime  chlorproMAZINE    Tablet 25 milliGRAM(s) Oral daily  dextrose 5% + sodium chloride 0.45% with potassium chloride 20 mEq/L 1000 milliLiter(s) IV Continuous <Continuous>  enoxaparin Injectable 40 milliGRAM(s) SubCutaneous daily  potassium chloride  10 mEq/100 mL IVPB 10 milliEquivalent(s) IV Intermittent every 1 hour  tamsulosin 0.4 milliGRAM(s) Oral at bedtime      LABS                        12.8   3.60  )-----------( 124      ( 17 Jan 2021 07:56 )             40.5     01-17    139  |  104  |  7   ----------------------------<  105<H>  3.6   |  27  |  0.67    Ca    8.5      17 Jan 2021 07:56  Phos  3.4     01-17  Mg     2.3     01-17      PT/INR - ( 17 Jan 2021 07:56 )   PT: 12.4 sec;   INR: 1.09 ratio         PTT - ( 17 Jan 2021 07:56 )  PTT:37.2 sec

## 2021-01-18 NOTE — PROGRESS NOTE ADULT - ASSESSMENT
55M with a sigmoid volvulus s/p GI decompression    PLAN:  - D51/2NS @125  - DVT ppx: lovenox  - NPO  - C/w home meds  - OR for sigmoidectomy      B Team Surgery [id number]

## 2021-01-19 LAB
ANION GAP SERPL CALC-SCNC: 8 MMOL/L — SIGNIFICANT CHANGE UP (ref 7–14)
APTT BLD: 38.2 SEC — HIGH (ref 27–36.3)
BUN SERPL-MCNC: 8 MG/DL — SIGNIFICANT CHANGE UP (ref 7–23)
CALCIUM SERPL-MCNC: 9.1 MG/DL — SIGNIFICANT CHANGE UP (ref 8.4–10.5)
CHLORIDE SERPL-SCNC: 100 MMOL/L — SIGNIFICANT CHANGE UP (ref 98–107)
CO2 SERPL-SCNC: 29 MMOL/L — SIGNIFICANT CHANGE UP (ref 22–31)
CREAT SERPL-MCNC: 0.65 MG/DL — SIGNIFICANT CHANGE UP (ref 0.5–1.3)
GLUCOSE SERPL-MCNC: 96 MG/DL — SIGNIFICANT CHANGE UP (ref 70–99)
HCT VFR BLD CALC: 46.8 % — SIGNIFICANT CHANGE UP (ref 39–50)
HGB BLD-MCNC: 15.3 G/DL — SIGNIFICANT CHANGE UP (ref 13–17)
INR BLD: 1.19 RATIO — HIGH (ref 0.88–1.16)
MAGNESIUM SERPL-MCNC: 2 MG/DL — SIGNIFICANT CHANGE UP (ref 1.6–2.6)
MCHC RBC-ENTMCNC: 30.1 PG — SIGNIFICANT CHANGE UP (ref 27–34)
MCHC RBC-ENTMCNC: 32.7 GM/DL — SIGNIFICANT CHANGE UP (ref 32–36)
MCV RBC AUTO: 92.1 FL — SIGNIFICANT CHANGE UP (ref 80–100)
NRBC # BLD: 0 /100 WBCS — SIGNIFICANT CHANGE UP
NRBC # FLD: 0 K/UL — SIGNIFICANT CHANGE UP
PHOSPHATE SERPL-MCNC: 3.4 MG/DL — SIGNIFICANT CHANGE UP (ref 2.5–4.5)
PLATELET # BLD AUTO: 141 K/UL — LOW (ref 150–400)
POTASSIUM SERPL-MCNC: 4.3 MMOL/L — SIGNIFICANT CHANGE UP (ref 3.5–5.3)
POTASSIUM SERPL-SCNC: 4.3 MMOL/L — SIGNIFICANT CHANGE UP (ref 3.5–5.3)
PROTHROM AB SERPL-ACNC: 13.5 SEC — SIGNIFICANT CHANGE UP (ref 10.6–13.6)
RBC # BLD: 5.08 M/UL — SIGNIFICANT CHANGE UP (ref 4.2–5.8)
RBC # FLD: 12.2 % — SIGNIFICANT CHANGE UP (ref 10.3–14.5)
SODIUM SERPL-SCNC: 137 MMOL/L — SIGNIFICANT CHANGE UP (ref 135–145)
WBC # BLD: 9.93 K/UL — SIGNIFICANT CHANGE UP (ref 3.8–10.5)
WBC # FLD AUTO: 9.93 K/UL — SIGNIFICANT CHANGE UP (ref 3.8–10.5)

## 2021-01-19 RX ORDER — ACETAMINOPHEN 500 MG
650 TABLET ORAL EVERY 4 HOURS
Refills: 0 | Status: DISCONTINUED | OUTPATIENT
Start: 2021-01-19 | End: 2021-02-04

## 2021-01-19 RX ORDER — SODIUM CHLORIDE 9 MG/ML
1000 INJECTION, SOLUTION INTRAVENOUS
Refills: 0 | Status: DISCONTINUED | OUTPATIENT
Start: 2021-01-19 | End: 2021-01-21

## 2021-01-19 RX ORDER — SODIUM CHLORIDE 9 MG/ML
1000 INJECTION, SOLUTION INTRAVENOUS
Refills: 0 | Status: DISCONTINUED | OUTPATIENT
Start: 2021-01-19 | End: 2021-01-19

## 2021-01-19 RX ORDER — HYDROMORPHONE HYDROCHLORIDE 2 MG/ML
0.5 INJECTION INTRAMUSCULAR; INTRAVENOUS; SUBCUTANEOUS ONCE
Refills: 0 | Status: DISCONTINUED | OUTPATIENT
Start: 2021-01-19 | End: 2021-01-19

## 2021-01-19 RX ADMIN — ENOXAPARIN SODIUM 40 MILLIGRAM(S): 100 INJECTION SUBCUTANEOUS at 11:31

## 2021-01-19 RX ADMIN — Medication 0.5 MILLIGRAM(S): at 11:31

## 2021-01-19 RX ADMIN — HYDROMORPHONE HYDROCHLORIDE 0.5 MILLIGRAM(S): 2 INJECTION INTRAMUSCULAR; INTRAVENOUS; SUBCUTANEOUS at 20:30

## 2021-01-19 RX ADMIN — Medication 650 MILLIGRAM(S): at 23:13

## 2021-01-19 RX ADMIN — Medication 0.5 MILLIGRAM(S): at 23:11

## 2021-01-19 RX ADMIN — TAMSULOSIN HYDROCHLORIDE 0.4 MILLIGRAM(S): 0.4 CAPSULE ORAL at 23:11

## 2021-01-19 RX ADMIN — Medication 100 MILLIGRAM(S): at 23:12

## 2021-01-19 RX ADMIN — Medication 400 MILLIGRAM(S): at 11:31

## 2021-01-19 RX ADMIN — SODIUM CHLORIDE 50 MILLILITER(S): 9 INJECTION, SOLUTION INTRAVENOUS at 11:17

## 2021-01-19 RX ADMIN — Medication 25 MILLIGRAM(S): at 11:31

## 2021-01-19 RX ADMIN — SODIUM CHLORIDE 100 MILLILITER(S): 9 INJECTION, SOLUTION INTRAVENOUS at 05:33

## 2021-01-19 RX ADMIN — SODIUM CHLORIDE 50 MILLILITER(S): 9 INJECTION, SOLUTION INTRAVENOUS at 08:42

## 2021-01-19 RX ADMIN — Medication 650 MILLIGRAM(S): at 17:50

## 2021-01-19 RX ADMIN — Medication 400 MILLIGRAM(S): at 00:16

## 2021-01-19 RX ADMIN — Medication 400 MILLIGRAM(S): at 05:29

## 2021-01-19 NOTE — PROGRESS NOTE ADULT - ASSESSMENT
55M with a sigmoid volvulus s/p GI decompression now POD1 ex-lap, sigmoidectomy and creation of end colostomy    Plan:  - tolerating CLD, quantify intake  - mIVF, likely decrease to 50cc/hr if adequate intake  - continue home meds  - pain control  - dc barnhart  - follow up AM labs, replete electrolytes as necessary  - DVT ppx with lovenox  - Dispo: lives at group home however new ostomy, will follow up if can return with ostomy or if will require placement into new home,    B team Surgery  [id number]

## 2021-01-19 NOTE — CHART NOTE - NSCHARTNOTEFT_GEN_A_CORE
GENERAL SURGERY POST-OPERATIVE NOTE    PIOTR MURPHY | [id number] | [id number]    Procedure: s/p rectal decompression for sigmoid volvulus    SUBJECTIVE: Patient seen approximately 4 hours after procedure. Patient tolerated procedure well  SOB:  [ ] YES [ x] NO  Chest Discomfort: [ ] YES [x ] NO    Nausea: [ ] YES [x ] NO           Vomiting: [ ] YES [x ] NO  Diarrhea: [ ] YES [x ] NO         Void: [ ]YES [x ]No           Pain Control Adequate: [x ] YES [ ] NO    PAST MEDICAL & SURGICAL HISTORY:  Seizure disorder    Autism disorder    No significant past surgical history        PHYSICAL EXAM:  Gen: NAD  Resp: breathing easily, no respiratory distress  CV: RRR  Abdomen: soft, nontender, nondistended      Vital Signs Last 24 Hrs  T(C): 36.7 (14 Jan 2021 19:30), Max: 36.8 (14 Jan 2021 14:40)  T(F): 98.1 (14 Jan 2021 19:30), Max: 98.2 (14 Jan 2021 14:40)  HR: 109 (14 Jan 2021 19:30) (100 - 128)  BP: 134/92 (14 Jan 2021 19:30) (116/86 - 139/96)  BP(mean): 106 (14 Jan 2021 19:30) (66 - 106)  RR: 16 (14 Jan 2021 19:30) (16 - 20)  SpO2: 99% (14 Jan 2021 19:30) (97% - 99%)  I&O's Summary    I&O's Detail                          13.3   13.09 )-----------( 146      ( 14 Jan 2021 15:20 )             40.0     01-14    141  |  106  |  21  ----------------------------<  106<H>  3.1<L>   |  25  |  0.71    Ca    8.1<L>      14 Jan 2021 15:20  Phos  2.0     01-14  Mg     2.7     01-14    TPro  7.5  /  Alb  4.3  /  TBili  0.2  /  DBili  x   /  AST  32  /  ALT  17  /  AlkPhos  77  01-14   PT/INR - ( 14 Jan 2021 11:12 )   PT: 12.6 sec;   INR: 1.10 ratio         PTT - ( 14 Jan 2021 11:12 )  PTT:33.8 sec    Assessment:  The patient is a 55y M who is now several hours post-op from a rectal decompression for sigmoid volvulus    Plan:  - Pain control as needed  - Assess if tolerating diet and advance as indicated  - DVT ppx  - Encourage OOB and ambulating as tolerated  - F/U BMP, replete potassium as appropriate  - IS    Adolfo Bell, PGY-1  Surgery B Team
Team spoke with patient's outside group home who reports that the patient was on Honey Thickened, Puree diet. Currently patient only on clear liquids, ordered as honey thickened clear liquids. Will continue to assess for tolerance and dietary intake. Order placed to provide assistance with bedside feeding for patient.
CAPRINI SCORE    AGE RELATED RISK FACTORS                                                             [x] Age 41-60 years                                            (1 Point)  [ ] Age: 61-74 years                                           (2 Points)                 [ ] Age= 75 years                                                (3 Points)             DISEASE RELATED RISK FACTORS                                                       [ ] Edema in the lower extremities                 (1 Point)                     [ ] Varicose veins                                               (1 Point)                                 [ ] BMI > 25 Kg/m2                                            (1 Point)                                  [ ] Serious infection (ie PNA)                            (1 Point)                     [ ] Lung disease ( COPD, Emphysema)            (1 Point)                                                                          [ ] Acute myocardial infarction                         (1 Point)                  [ ] Congestive heart failure (in the previous month)  (1 Point)         [ ] Inflammatory bowel disease                            (1 Point)                  [ ] Central venous access, PICC or Port               (2 points)       (within the last month)                                                                [ ] Stroke (in the previous month)                        (5 Points)    [ ] Previous or present malignancy                       (2 points)                                                                                                                                                         HEMATOLOGY RELATED FACTORS                                                         [ ] Prior episodes of VTE                                     (3 Points)                     [ ] Positive family history for VTE                      (3 Points)                  [ ] Prothrombin 33342 A                                     (3 Points)                     [ ] Factor V Leiden                                                (3 Points)                        [ ] Lupus anticoagulants                                      (3 Points)                                                           [ ] Anticardiolipin antibodies                              (3 Points)                                                       [ ] High homocysteine in the blood                   (3 Points)                                             [ ] Other congenital or acquired thrombophilia      (3 Points)                                                [ ] Heparin induced thrombocytopenia                  (3 Points)                                        MOBILITY RELATED FACTORS  [ ] Bed rest                                                         (1 Point)  [ ] Plaster cast                                                    (2 points)  [ ] Bed bound for more than 72 hours           (2 Points)    GENDER SPECIFIC FACTORS  [ ] Pregnancy or had a baby within the last month   (1 Point)  [ ] Post-partum < 6 weeks                                   (1 Point)  [ ] Hormonal therapy  or oral contraception   (1 Point)  [ ] History of pregnancy complications              (1 point)  [ ] Unexplained or recurrent              (1 Point)    OTHER RISK FACTORS                                           (1 Point)  BMI >40, smoking, diabetes requiring insulin, chemotherapy  blood transfusions and length of surgery over 2 hours    SURGERY RELATED RISK FACTORS  [ ]  Section within the last month     (1 Point)  [x ] Minor surgery                                                  (1 Point)  [ ] Arthroscopic surgery                                       (2 Points)  [ ] Planned major surgery lasting more            (2 Points)      than 45 minutes     [ ] Elective hip or knee joint replacement       (5 points)       surgery                                                TRAUMA RELATED RISK FACTORS  [ ] Fracture of the hip, pelvis, or leg                       (5 Points)  [ ] Spinal cord injury resulting in paralysis             (5 points)       (in the previous month)    [ ] Paralysis  (less than 1 month)                             (5 Points)  [ ] Multiple Trauma within 1 month                        (5 Points)    Total Score [  2  ]    Caprini Score 0-2: Low Risk, NO VTE prophylaxis required for most patients, encourage ambulation  Caprini Score 3-6: Moderate Risk , pharmacologic VTE prophylaxis is indicated for most patients (in the absence of contraindications)  Caprini Score Greater than or =7: High risk, pharmacologic VTE prophylaxis indicated for most patients (in the absence of contraindications)
SUBJECTIVE: Pt seen in 4 hours after procedure. Pt tolerated procedure well. Pt reports pain is well controlled.  SOB:  [ ] YES [x ] NO  Chest Discomfort: [ ] YES [x ] NO    Nausea: [ ] YES [x ] NO           Vomiting: [ ] YES [x ] NO  Flatus: [ ] YES [x ] NO             Bowel Movement: [ ] YES [x ] NO  Diarrhea: [ ] YES [x ] NO         Constipation: [ ] YES [x ] NO             Pain Control Adequate: [x ] YES [ ] NO  Tolerating PO sips of water: [x ] YES [ ] NO  Pulliam: 550cc      OBJECTIVE:  PHYSICAL EXAM:  Gen: AAOx3, NAD  HEENT: NC/AT  RESP: Non labored breathing  ABDOMEN: Soft, mildly TTP around incisions, ND. Incision site dressing C/D/I    Vital Signs Last 24 Hrs  T(C): 36.9 (18 Jan 2021 21:34), Max: 36.9 (18 Jan 2021 21:34)  T(F): 98.4 (18 Jan 2021 21:34), Max: 98.4 (18 Jan 2021 21:34)  HR: 100 (18 Jan 2021 21:34) (59 - 106)  BP: 110/72 (18 Jan 2021 21:52) (97/54 - 128/72)  BP(mean): 79 (18 Jan 2021 18:00) (73 - 86)  RR: 17 (18 Jan 2021 21:52) (9 - 17)  SpO2: 100% (18 Jan 2021 21:52) (95% - 100%)      A/P: 55y Male s/p Friedman's  - Diet: CLear  - Activity: OOBAT  - Labs   - Pain medication  -Pulliam    [id number]

## 2021-01-19 NOTE — PROGRESS NOTE ADULT - SUBJECTIVE AND OBJECTIVE BOX
TEAM [ B ] Surgery Daily Progress Note  =====================================================    SUBJECTIVE: Patient seen and examined on AM rounds. Subjective history limited patient nonverbal at baseline.    PAST MEDICAL & SURGICAL HISTORY:  Seizure disorder  Autism disorder  No significant past surgical history    ALLERGIES:  No Known Allergies    --------------------------------------------------------------------------------------    MEDICATIONS:    Neurologic Medications  acetaminophen  IVPB .. 1000 milliGRAM(s) IV Intermittent every 6 hours  benztropine 0.5 milliGRAM(s) Oral two times a day  chlorproMAZINE    Tablet 100 milliGRAM(s) Oral at bedtime  chlorproMAZINE    Tablet 25 milliGRAM(s) Oral daily  HYDROmorphone  Injectable 0.25 milliGRAM(s) IV Push every 4 hours PRN Severe Pain (7 - 10)  oxyCODONE    IR 5 milliGRAM(s) Oral every 4 hours PRN Moderate Pain (4 - 6)    Cardiovascular Medications  tamsulosin 0.4 milliGRAM(s) Oral at bedtime    Gastrointestinal Medications  lactated ringers. 1000 milliLiter(s) IV Continuous <Continuous>    Hematologic/Oncologic Medications  enoxaparin Injectable 40 milliGRAM(s) SubCutaneous daily    --------------------------------------------------------------------------------------    VITAL SIGNS:  ICU Vital Signs Last 24 Hrs  T(C): 37.1 (19 Jan 2021 05:33), Max: 37.1 (19 Jan 2021 05:33)  T(F): 98.8 (19 Jan 2021 05:33), Max: 98.8 (19 Jan 2021 05:33)  HR: 82 (19 Jan 2021 05:33) (59 - 106)  BP: 117/74 (19 Jan 2021 05:33) (101/67 - 128/72)  BP(mean): 79 (18 Jan 2021 18:00) (73 - 86)  RR: 17 (19 Jan 2021 05:33) (9 - 17)  SpO2: 99% (19 Jan 2021 05:33) (95% - 100%)      --------------------------------------------------------------------------------------    EXAM    General: NAD, resting in bed comfortably.  Cardiac: regular rate, warm and well perfused.  Respiratory: Nonlabored respirations, normal cw expansion.  Abdomen: soft, nontender, nondistended, midline incision c/d/i, ostomy with bowel sweat -/-  Extremities: ALAN, no deformities.    --------------------------------------------------------------------------------------    LABS            --------------------------------------------------------------------------------------    INS AND OUTS:    I&O's Detail    18 Jan 2021 07:01  -  19 Jan 2021 07:00  --------------------------------------------------------  IN:  Total IN: 0 mL    OUT:    Incontinent per Collection Bag (mL): 1000 mL    Indwelling Catheter - Urethral (mL): 400 mL  Total OUT: 1400 mL    Total NET: -1400 mL        --------------------------------------------------------------------------------------

## 2021-01-20 LAB
ANION GAP SERPL CALC-SCNC: 9 MMOL/L — SIGNIFICANT CHANGE UP (ref 7–14)
BUN SERPL-MCNC: 10 MG/DL — SIGNIFICANT CHANGE UP (ref 7–23)
CALCIUM SERPL-MCNC: 8.9 MG/DL — SIGNIFICANT CHANGE UP (ref 8.4–10.5)
CHLORIDE SERPL-SCNC: 101 MMOL/L — SIGNIFICANT CHANGE UP (ref 98–107)
CO2 SERPL-SCNC: 27 MMOL/L — SIGNIFICANT CHANGE UP (ref 22–31)
CREAT SERPL-MCNC: 0.77 MG/DL — SIGNIFICANT CHANGE UP (ref 0.5–1.3)
GLUCOSE SERPL-MCNC: 112 MG/DL — HIGH (ref 70–99)
HCT VFR BLD CALC: 44.6 % — SIGNIFICANT CHANGE UP (ref 39–50)
HGB BLD-MCNC: 14.5 G/DL — SIGNIFICANT CHANGE UP (ref 13–17)
MAGNESIUM SERPL-MCNC: 2.1 MG/DL — SIGNIFICANT CHANGE UP (ref 1.6–2.6)
MCHC RBC-ENTMCNC: 30.1 PG — SIGNIFICANT CHANGE UP (ref 27–34)
MCHC RBC-ENTMCNC: 32.5 GM/DL — SIGNIFICANT CHANGE UP (ref 32–36)
MCV RBC AUTO: 92.5 FL — SIGNIFICANT CHANGE UP (ref 80–100)
NRBC # BLD: 0 /100 WBCS — SIGNIFICANT CHANGE UP
NRBC # FLD: 0 K/UL — SIGNIFICANT CHANGE UP
PHOSPHATE SERPL-MCNC: 3 MG/DL — SIGNIFICANT CHANGE UP (ref 2.5–4.5)
PLATELET # BLD AUTO: 133 K/UL — LOW (ref 150–400)
POTASSIUM SERPL-MCNC: 3.7 MMOL/L — SIGNIFICANT CHANGE UP (ref 3.5–5.3)
POTASSIUM SERPL-SCNC: 3.7 MMOL/L — SIGNIFICANT CHANGE UP (ref 3.5–5.3)
RBC # BLD: 4.82 M/UL — SIGNIFICANT CHANGE UP (ref 4.2–5.8)
RBC # FLD: 12.4 % — SIGNIFICANT CHANGE UP (ref 10.3–14.5)
SODIUM SERPL-SCNC: 137 MMOL/L — SIGNIFICANT CHANGE UP (ref 135–145)
WBC # BLD: 8.23 K/UL — SIGNIFICANT CHANGE UP (ref 3.8–10.5)
WBC # FLD AUTO: 8.23 K/UL — SIGNIFICANT CHANGE UP (ref 3.8–10.5)

## 2021-01-20 RX ADMIN — SODIUM CHLORIDE 50 MILLILITER(S): 9 INJECTION, SOLUTION INTRAVENOUS at 19:08

## 2021-01-20 RX ADMIN — Medication 650 MILLIGRAM(S): at 07:20

## 2021-01-20 RX ADMIN — Medication 650 MILLIGRAM(S): at 18:41

## 2021-01-20 RX ADMIN — Medication 650 MILLIGRAM(S): at 03:11

## 2021-01-20 RX ADMIN — TAMSULOSIN HYDROCHLORIDE 0.4 MILLIGRAM(S): 0.4 CAPSULE ORAL at 21:45

## 2021-01-20 RX ADMIN — ENOXAPARIN SODIUM 40 MILLIGRAM(S): 100 INJECTION SUBCUTANEOUS at 13:11

## 2021-01-20 RX ADMIN — Medication 650 MILLIGRAM(S): at 21:45

## 2021-01-20 RX ADMIN — Medication 25 MILLIGRAM(S): at 13:11

## 2021-01-20 RX ADMIN — Medication 650 MILLIGRAM(S): at 13:11

## 2021-01-20 RX ADMIN — Medication 100 MILLIGRAM(S): at 21:46

## 2021-01-20 RX ADMIN — Medication 0.5 MILLIGRAM(S): at 10:29

## 2021-01-20 RX ADMIN — Medication 0.5 MILLIGRAM(S): at 21:45

## 2021-01-20 RX ADMIN — Medication 650 MILLIGRAM(S): at 10:30

## 2021-01-20 NOTE — PROGRESS NOTE ADULT - ASSESSMENT
55M with a sigmoid volvulus s/p GI decompression now POD1 ex-lap, sigmoidectomy and creation of end colostomy    Plan:  - tolerating CLD, quantify intake  - mIVF at 50cc/hr   - continue home meds  - pain control  - follow up AM labs, replete electrolytes as necessary  - DVT ppx with lovenox  - Dispo: lives at group home who is willing to teach staff ostomy care    B team Surgery  [id number]   55M with a sigmoid volvulus s/p GI decompression now POD2 ex-lap, sigmoidectomy and creation of end colostomy    Plan:  - tolerating CLD, quantify intake  - mIVF at 50cc/hr   - continue home meds  - pain control  - follow up AM labs, replete electrolytes as necessary  - DVT ppx with lovenox  - Dispo: lives at group home who is willing to teach staff ostomy care    B team Surgery  [id number]

## 2021-01-20 NOTE — PROGRESS NOTE ADULT - SUBJECTIVE AND OBJECTIVE BOX
TEAM [ B ] Surgery Daily Progress Note  =====================================================    SUBJECTIVE: Patient seen and examined at bedside on AM rounds. Subjective history limited secondary to patient nonverbal baseline.    PAST MEDICAL & SURGICAL HISTORY:  Seizure disorder  Autism disorder  No significant past surgical history    ALLERGIES:  No Known Allergies    --------------------------------------------------------------------------------------    MEDICATIONS:    Neurologic Medications  acetaminophen   Tablet .. 650 milliGRAM(s) Oral every 4 hours  benztropine 0.5 milliGRAM(s) Oral two times a day  chlorproMAZINE    Tablet 100 milliGRAM(s) Oral at bedtime  chlorproMAZINE    Tablet 25 milliGRAM(s) Oral daily    Cardiovascular Medications  tamsulosin 0.4 milliGRAM(s) Oral at bedtime    Gastrointestinal Medications  dextrose 5% + lactated ringers. 1000 milliLiter(s) IV Continuous <Continuous>    Hematologic/Oncologic Medications  enoxaparin Injectable 40 milliGRAM(s) SubCutaneous daily      --------------------------------------------------------------------------------------    VITAL SIGNS:  ICU Vital Signs Last 24 Hrs  T(C): 36.7 (20 Jan 2021 07:12), Max: 37.2 (20 Jan 2021 02:28)  T(F): 98.1 (20 Jan 2021 07:12), Max: 99 (20 Jan 2021 02:28)  HR: 86 (20 Jan 2021 07:12) (86 - 96)  BP: 101/63 (20 Jan 2021 07:12) (101/56 - 118/74)  RR: 16 (20 Jan 2021 07:12) (16 - 18)  SpO2: 96% (20 Jan 2021 07:12) (96% - 100%)      --------------------------------------------------------------------------------------    EXAM    General: NAD, resting in bed comfortably.  Cardiac: regular rate, warm and well perfused.  Respiratory: Nonlabored respirations, normal cw expansion.  Abdomen: soft, nontender, nondistended, midline incision c/d/i, colostomy -/-  Extremities: ALAN, no deformities.    --------------------------------------------------------------------------------------    LABS                          14.5   8.23  )-----------( 133      ( 20 Jan 2021 07:04 )             44.6     01-19    137  |  100  |  8   ----------------------------<  96  4.3   |  29  |  0.65    Ca    9.1      19 Jan 2021 07:58  Phos  3.4     01-19  Mg     2.0     01-19          --------------------------------------------------------------------------------------    INS AND OUTS:    I&O's Detail    19 Jan 2021 07:01  -  20 Jan 2021 07:00  --------------------------------------------------------  IN:    dextrose 5% + lactated ringers: 600 mL  Total IN: 600 mL    OUT:    Colostomy (mL): 0 mL    Incontinent per Collection Bag (mL): 300 mL    Indwelling Catheter - Urethral (mL): 1400 mL  Total OUT: 1700 mL    Total NET: -1100 mL        --------------------------------------------------------------------------------------

## 2021-01-21 LAB
ANION GAP SERPL CALC-SCNC: 10 MMOL/L — SIGNIFICANT CHANGE UP (ref 7–14)
BUN SERPL-MCNC: 11 MG/DL — SIGNIFICANT CHANGE UP (ref 7–23)
CALCIUM SERPL-MCNC: 9 MG/DL — SIGNIFICANT CHANGE UP (ref 8.4–10.5)
CHLORIDE SERPL-SCNC: 101 MMOL/L — SIGNIFICANT CHANGE UP (ref 98–107)
CO2 SERPL-SCNC: 27 MMOL/L — SIGNIFICANT CHANGE UP (ref 22–31)
CREAT SERPL-MCNC: 0.68 MG/DL — SIGNIFICANT CHANGE UP (ref 0.5–1.3)
GLUCOSE SERPL-MCNC: 119 MG/DL — HIGH (ref 70–99)
HCT VFR BLD CALC: 41.7 % — SIGNIFICANT CHANGE UP (ref 39–50)
HGB BLD-MCNC: 13.9 G/DL — SIGNIFICANT CHANGE UP (ref 13–17)
MAGNESIUM SERPL-MCNC: 2.2 MG/DL — SIGNIFICANT CHANGE UP (ref 1.6–2.6)
MCHC RBC-ENTMCNC: 30.4 PG — SIGNIFICANT CHANGE UP (ref 27–34)
MCHC RBC-ENTMCNC: 33.3 GM/DL — SIGNIFICANT CHANGE UP (ref 32–36)
MCV RBC AUTO: 91.2 FL — SIGNIFICANT CHANGE UP (ref 80–100)
NRBC # BLD: 0 /100 WBCS — SIGNIFICANT CHANGE UP
NRBC # FLD: 0 K/UL — SIGNIFICANT CHANGE UP
PHOSPHATE SERPL-MCNC: 3.2 MG/DL — SIGNIFICANT CHANGE UP (ref 2.5–4.5)
PLATELET # BLD AUTO: 130 K/UL — LOW (ref 150–400)
POTASSIUM SERPL-MCNC: 3.8 MMOL/L — SIGNIFICANT CHANGE UP (ref 3.5–5.3)
POTASSIUM SERPL-SCNC: 3.8 MMOL/L — SIGNIFICANT CHANGE UP (ref 3.5–5.3)
RBC # BLD: 4.57 M/UL — SIGNIFICANT CHANGE UP (ref 4.2–5.8)
RBC # FLD: 12.6 % — SIGNIFICANT CHANGE UP (ref 10.3–14.5)
SODIUM SERPL-SCNC: 138 MMOL/L — SIGNIFICANT CHANGE UP (ref 135–145)
WBC # BLD: 6.6 K/UL — SIGNIFICANT CHANGE UP (ref 3.8–10.5)
WBC # FLD AUTO: 6.6 K/UL — SIGNIFICANT CHANGE UP (ref 3.8–10.5)

## 2021-01-21 RX ADMIN — Medication 650 MILLIGRAM(S): at 21:35

## 2021-01-21 RX ADMIN — Medication 650 MILLIGRAM(S): at 05:15

## 2021-01-21 RX ADMIN — Medication 650 MILLIGRAM(S): at 17:23

## 2021-01-21 RX ADMIN — Medication 0.5 MILLIGRAM(S): at 09:50

## 2021-01-21 RX ADMIN — Medication 650 MILLIGRAM(S): at 13:24

## 2021-01-21 RX ADMIN — SODIUM CHLORIDE 50 MILLILITER(S): 9 INJECTION, SOLUTION INTRAVENOUS at 09:50

## 2021-01-21 RX ADMIN — Medication 0.5 MILLIGRAM(S): at 21:35

## 2021-01-21 RX ADMIN — Medication 650 MILLIGRAM(S): at 09:50

## 2021-01-21 RX ADMIN — TAMSULOSIN HYDROCHLORIDE 0.4 MILLIGRAM(S): 0.4 CAPSULE ORAL at 21:36

## 2021-01-21 RX ADMIN — Medication 25 MILLIGRAM(S): at 11:50

## 2021-01-21 RX ADMIN — ENOXAPARIN SODIUM 40 MILLIGRAM(S): 100 INJECTION SUBCUTANEOUS at 11:52

## 2021-01-21 RX ADMIN — Medication 100 MILLIGRAM(S): at 22:15

## 2021-01-21 NOTE — OCCUPATIONAL THERAPY INITIAL EVALUATION ADULT - GENERAL OBSERVATIONS, REHAB EVAL
Patient received semisupine in bed in NAD. +IV. +condom catheter. Per AMADOR Calzada, patient okay to participate in OT evaluation. Patient alert, unable to follow commands.

## 2021-01-21 NOTE — OCCUPATIONAL THERAPY INITIAL EVALUATION ADULT - ANTICIPATED DISCHARGE DISPOSITION, OT EVAL
Patient is not an appropriate candidate for skilled OT services. Patient is unable to follow commands, requires dependent assistance with all ADLs and functional mobility. Patient will not be placed on OT program. Please reconsult this discipline with any changes.

## 2021-01-21 NOTE — OCCUPATIONAL THERAPY INITIAL EVALUATION ADULT - RANGE OF MOTION EXAMINATION, UPPER EXTREMITY
Left UE passive ROM shoulder grossly 0-20 degrees flexion, distally WFL/Right UE Passive ROM was WFL  (within functional limits)

## 2021-01-21 NOTE — OCCUPATIONAL THERAPY INITIAL EVALUATION ADULT - ADDITIONAL COMMENTS
Patient unable to provide social history as patient is non-verbal at baseline. Per care coordination assessment, patient lives in a group home and needed assistance with all ADLs.

## 2021-01-21 NOTE — PROGRESS NOTE ADULT - SUBJECTIVE AND OBJECTIVE BOX
GENERAL SURGERY PROGRESS NOTE    SUBJECTIVE  Patient seen and examined. No acute events overnight. Denies fever, chills, SOB, chest pain.         OBJECTIVE    PHYSICAL EXAM  General: Appears well, NAD  CHEST: breathing comfortably  CV: appears well perfused  Abdomen: soft, nontender, nondistended, no rebound or guarding, ostomy pink and viable with gas and stool in bag  Extremities: Grossly symmetric    T(C): 36.7 (01-21-21 @ 05:11), Max: 36.7 (01-20-21 @ 14:05)  HR: 94 (01-21-21 @ 05:11) (89 - 99)  BP: 98/59 (01-21-21 @ 05:11) (98/59 - 115/66)  RR: 16 (01-21-21 @ 05:11) (16 - 18)  SpO2: 96% (01-21-21 @ 05:11) (96% - 99%)    01-20-21 @ 07:01  -  01-21-21 @ 07:00  --------------------------------------------------------  IN: 300 mL / OUT: 1300 mL / NET: -1000 mL        MEDICATIONS  acetaminophen   Tablet .. 650 milliGRAM(s) Oral every 4 hours  benztropine 0.5 milliGRAM(s) Oral two times a day  chlorproMAZINE    Tablet 100 milliGRAM(s) Oral at bedtime  chlorproMAZINE    Tablet 25 milliGRAM(s) Oral daily  dextrose 5% + lactated ringers. 1000 milliLiter(s) IV Continuous <Continuous>  enoxaparin Injectable 40 milliGRAM(s) SubCutaneous daily  tamsulosin 0.4 milliGRAM(s) Oral at bedtime      LABS                        14.5   8.23  )-----------( 133      ( 20 Jan 2021 07:04 )             44.6     01-20    137  |  101  |  10  ----------------------------<  112<H>  3.7   |  27  |  0.77    Ca    8.9      20 Jan 2021 07:04  Phos  3.0     01-20  Mg     2.1     01-20

## 2021-01-21 NOTE — PROGRESS NOTE ADULT - ASSESSMENT
55M with a sigmoid volvulus s/p GI decompression now POD3 s/p ex-lap, sigmoidectomy and creation of end colostomy    Plan:  - tolerating CLD, quantify intake  - mIVF at 50cc/hr   - continue home meds  - pain control  - follow up AM labs, replete electrolytes as necessary  - DVT ppx with lovenox  - Dispo: lives at group home who is willing to teach staff ostomy care  - Ostomy teaching    B team Surgery  [id number]

## 2021-01-21 NOTE — OCCUPATIONAL THERAPY INITIAL EVALUATION ADULT - DIAGNOSIS, OT EVAL
s/p sigmoid volvulus, s/p ex-lap, s/p colostomy; decreased functional mobility, decreased ADL performance

## 2021-01-21 NOTE — OCCUPATIONAL THERAPY INITIAL EVALUATION ADULT - PERTINENT HX OF CURRENT PROBLEM, REHAB EVAL
55 year old male presenting with sigmoid volvulus s/p GI decompression and s/p ex-lap, sigmoidectomy and creation of end colostomy.

## 2021-01-22 ENCOUNTER — TRANSCRIPTION ENCOUNTER (OUTPATIENT)
Age: 56
End: 2021-01-22

## 2021-01-22 LAB
ANION GAP SERPL CALC-SCNC: 11 MMOL/L — SIGNIFICANT CHANGE UP (ref 7–14)
BLD GP AB SCN SERPL QL: NEGATIVE — SIGNIFICANT CHANGE UP
BUN SERPL-MCNC: 13 MG/DL — SIGNIFICANT CHANGE UP (ref 7–23)
CALCIUM SERPL-MCNC: 8.7 MG/DL — SIGNIFICANT CHANGE UP (ref 8.4–10.5)
CHLORIDE SERPL-SCNC: 101 MMOL/L — SIGNIFICANT CHANGE UP (ref 98–107)
CO2 SERPL-SCNC: 26 MMOL/L — SIGNIFICANT CHANGE UP (ref 22–31)
CREAT SERPL-MCNC: 0.77 MG/DL — SIGNIFICANT CHANGE UP (ref 0.5–1.3)
GLUCOSE SERPL-MCNC: 84 MG/DL — SIGNIFICANT CHANGE UP (ref 70–99)
HCT VFR BLD CALC: 40.7 % — SIGNIFICANT CHANGE UP (ref 39–50)
HCT VFR BLD CALC: 43.4 % — SIGNIFICANT CHANGE UP (ref 39–50)
HGB BLD-MCNC: 13.5 G/DL — SIGNIFICANT CHANGE UP (ref 13–17)
HGB BLD-MCNC: 14.5 G/DL — SIGNIFICANT CHANGE UP (ref 13–17)
MAGNESIUM SERPL-MCNC: 2.1 MG/DL — SIGNIFICANT CHANGE UP (ref 1.6–2.6)
MCHC RBC-ENTMCNC: 30 PG — SIGNIFICANT CHANGE UP (ref 27–34)
MCHC RBC-ENTMCNC: 30.1 PG — SIGNIFICANT CHANGE UP (ref 27–34)
MCHC RBC-ENTMCNC: 33.2 GM/DL — SIGNIFICANT CHANGE UP (ref 32–36)
MCHC RBC-ENTMCNC: 33.4 GM/DL — SIGNIFICANT CHANGE UP (ref 32–36)
MCV RBC AUTO: 89.9 FL — SIGNIFICANT CHANGE UP (ref 80–100)
MCV RBC AUTO: 90.6 FL — SIGNIFICANT CHANGE UP (ref 80–100)
NRBC # BLD: 0 /100 WBCS — SIGNIFICANT CHANGE UP
NRBC # BLD: 0 /100 WBCS — SIGNIFICANT CHANGE UP
NRBC # FLD: 0 K/UL — SIGNIFICANT CHANGE UP
NRBC # FLD: 0 K/UL — SIGNIFICANT CHANGE UP
PHOSPHATE SERPL-MCNC: 3.3 MG/DL — SIGNIFICANT CHANGE UP (ref 2.5–4.5)
PLATELET # BLD AUTO: 160 K/UL — SIGNIFICANT CHANGE UP (ref 150–400)
PLATELET # BLD AUTO: 175 K/UL — SIGNIFICANT CHANGE UP (ref 150–400)
POTASSIUM SERPL-MCNC: 3.8 MMOL/L — SIGNIFICANT CHANGE UP (ref 3.5–5.3)
POTASSIUM SERPL-SCNC: 3.8 MMOL/L — SIGNIFICANT CHANGE UP (ref 3.5–5.3)
RBC # BLD: 4.49 M/UL — SIGNIFICANT CHANGE UP (ref 4.2–5.8)
RBC # BLD: 4.83 M/UL — SIGNIFICANT CHANGE UP (ref 4.2–5.8)
RBC # FLD: 12.5 % — SIGNIFICANT CHANGE UP (ref 10.3–14.5)
RBC # FLD: 12.6 % — SIGNIFICANT CHANGE UP (ref 10.3–14.5)
RH IG SCN BLD-IMP: POSITIVE — SIGNIFICANT CHANGE UP
SODIUM SERPL-SCNC: 138 MMOL/L — SIGNIFICANT CHANGE UP (ref 135–145)
WBC # BLD: 5.24 K/UL — SIGNIFICANT CHANGE UP (ref 3.8–10.5)
WBC # BLD: 5.33 K/UL — SIGNIFICANT CHANGE UP (ref 3.8–10.5)
WBC # FLD AUTO: 5.24 K/UL — SIGNIFICANT CHANGE UP (ref 3.8–10.5)
WBC # FLD AUTO: 5.33 K/UL — SIGNIFICANT CHANGE UP (ref 3.8–10.5)

## 2021-01-22 RX ORDER — PIPERACILLIN AND TAZOBACTAM 4; .5 G/20ML; G/20ML
3.38 INJECTION, POWDER, LYOPHILIZED, FOR SOLUTION INTRAVENOUS EVERY 8 HOURS
Refills: 0 | Status: DISCONTINUED | OUTPATIENT
Start: 2021-01-22 | End: 2021-01-24

## 2021-01-22 RX ORDER — SODIUM CHLORIDE 9 MG/ML
1000 INJECTION, SOLUTION INTRAVENOUS
Refills: 0 | Status: DISCONTINUED | OUTPATIENT
Start: 2021-01-22 | End: 2021-01-23

## 2021-01-22 RX ORDER — PIPERACILLIN AND TAZOBACTAM 4; .5 G/20ML; G/20ML
3.38 INJECTION, POWDER, LYOPHILIZED, FOR SOLUTION INTRAVENOUS ONCE
Refills: 0 | Status: COMPLETED | OUTPATIENT
Start: 2021-01-22 | End: 2021-01-22

## 2021-01-22 RX ORDER — ACETAMINOPHEN 500 MG
2 TABLET ORAL
Qty: 0 | Refills: 0 | DISCHARGE
Start: 2021-01-22

## 2021-01-22 RX ADMIN — PIPERACILLIN AND TAZOBACTAM 200 GRAM(S): 4; .5 INJECTION, POWDER, LYOPHILIZED, FOR SOLUTION INTRAVENOUS at 23:20

## 2021-01-22 RX ADMIN — Medication 25 MILLIGRAM(S): at 12:06

## 2021-01-22 RX ADMIN — Medication 650 MILLIGRAM(S): at 06:25

## 2021-01-22 RX ADMIN — Medication 650 MILLIGRAM(S): at 17:19

## 2021-01-22 RX ADMIN — Medication 650 MILLIGRAM(S): at 12:02

## 2021-01-22 RX ADMIN — Medication 0.5 MILLIGRAM(S): at 12:06

## 2021-01-22 RX ADMIN — ENOXAPARIN SODIUM 40 MILLIGRAM(S): 100 INJECTION SUBCUTANEOUS at 12:06

## 2021-01-22 RX ADMIN — SODIUM CHLORIDE 125 MILLILITER(S): 9 INJECTION, SOLUTION INTRAVENOUS at 23:20

## 2021-01-22 NOTE — DISCHARGE NOTE PROVIDER - HOSPITAL COURSE
56y/o M w/ h/o MR (non verbal at baseline) p/w abdominal pain to [facility] ED on 1/14. Paitent nonverbal but with aid who knows patient well. States has been acting differently for the past 2days, noticed abdomen distended today and is grunting. Patient had meal this AM and spit it out en route to hospital. No episodes of vomiting. 1 BM this morning and urinary incontinence in the stretcher, normally able to urinate ok. Denies fevers, chills, cough, diarrhea, hematuria, history of surgeries. CT scan performed in ED revealed Sigmoid volvulus resulting in large bowel obstruction. Patient was admitted to [facility] Acute Care Surgical Service.  Patient was brought to the OR emergently by Dr. Escobar for attempted rectal decompression under anesthesia, rectal detorsion and rectal tube placement. Patient transferred to surgical floor where his diet was advanced as tolerated. Patient was medically optimized for surgery. On 1/18 patient was taken to OR by Dr. Solo Chow where he underwent exploratory laparotomy, sigmoidectomy and creation of end colostomy. Patient tolerated operation well without complication. Patient was transferred to surgical floor where diet was advanced as tolerated and IV pain medications transitioned to oral.  Patient is currently tolerating regular pureed diet, colostomy with air and fecal content, pain is well controlled, voiding, and physical activity at baseline.   Per team and attending patient is stable for discharge to group home and follow up with Dr. Escobar in one week.    56y/o M w/ h/o MR (non verbal at baseline) p/w abdominal pain to [facility] ED on 1/14. Paitent nonverbal but with aid who knows patient well. States has been acting differently for the past 2days, noticed abdomen distended today and is grunting. Patient had meal this AM and spit it out en route to hospital. No episodes of vomiting. 1 BM this morning and urinary incontinence in the stretcher, normally able to urinate ok. Denies fevers, chills, cough, diarrhea, hematuria, history of surgeries. CT scan performed in ED revealed Sigmoid volvulus resulting in large bowel obstruction. Patient was admitted to [facility] Acute Care Surgical Service.  Patient was brought to the OR emergently by Dr. Escobar for attempted rectal decompression under anesthesia, rectal detorsion and rectal tube placement. Patient transferred to surgical floor where his diet was advanced as tolerated. Patient was medically optimized for surgery. On 1/18 patient was taken to OR by Dr. Solo Chow where he underwent exploratory laparotomy, sigmoidectomy and creation of end colostomy. Post operatively had drainage from midline wound. He returned to OR on 1/23 for evacuation of abdominal wall hematoma. Pt tolerated procedure well.     Patient is currently tolerating regular pureed diet, colostomy with air and fecal content, pain is well controlled, voiding, and physical activity at baseline.   Per team and attending patient is stable for discharge to group home and follow up with Dr. Escobar in one week.

## 2021-01-22 NOTE — ADVANCED PRACTICE NURSE CONSULT - REASON FOR CONSULT
Patient seen for initial Colostomy teaching. Patient unable to be educated on ostomy care due to cognitive impairment hx of autism. Lives in a group home. 
Patient seen for ostomy teaching with group home employees for teaching session.

## 2021-01-22 NOTE — DISCHARGE NOTE PROVIDER - CARE PROVIDER_API CALL
Riley Escobar)  Surgery; Surgical Critical Care  [address] [place] [address]  Phone: [phone]  Fax: [phone]  Follow Up Time: 1 week

## 2021-01-22 NOTE — PROGRESS NOTE ADULT - SUBJECTIVE AND OBJECTIVE BOX
GENERAL SURGERY PROGRESS NOTE    SUBJECTIVE  Patient seen and examined. No acute events overnight. Subjective limited 2/2 patient's mental status        OBJECTIVE    PHYSICAL EXAM  General: Appears well, NAD  CHEST: breathing comfortably  CV: appears well perfused  Abdomen: soft, nontender, nondistended, no rebound or guarding, ostomy pink and viable with gas in bag  Extremities: Grossly symmetric    T(C): 36.9 (01-22-21 @ 04:53), Max: 36.9 (01-22-21 @ 04:53)  HR: 89 (01-22-21 @ 04:53) (79 - 89)  BP: 101/68 (01-22-21 @ 04:53) (101/68 - 114/72)  RR: 17 (01-22-21 @ 04:53) (16 - 18)  SpO2: 97% (01-22-21 @ 04:53) (97% - 98%)    01-21-21 @ 07:01  -  01-22-21 @ 07:00  --------------------------------------------------------  IN: 0 mL / OUT: 150 mL / NET: -150 mL        MEDICATIONS  acetaminophen   Tablet .. 650 milliGRAM(s) Oral every 4 hours  benztropine 0.5 milliGRAM(s) Oral two times a day  chlorproMAZINE    Tablet 100 milliGRAM(s) Oral at bedtime  chlorproMAZINE    Tablet 25 milliGRAM(s) Oral daily  enoxaparin Injectable 40 milliGRAM(s) SubCutaneous daily  tamsulosin 0.4 milliGRAM(s) Oral at bedtime      LABS                        13.9   6.60  )-----------( 130      ( 21 Jan 2021 07:52 )             41.7     01-21    138  |  101  |  11  ----------------------------<  119<H>  3.8   |  27  |  0.68    Ca    9.0      21 Jan 2021 07:52  Phos  3.2     01-21  Mg     2.2     01-21

## 2021-01-22 NOTE — DISCHARGE NOTE PROVIDER - NSDCMRMEDTOKEN_GEN_ALL_CORE_FT
acetaminophen 325 mg oral tablet: 2 tab(s) orally every 4 hours  benztropine 0.5 mg oral tablet: 1 tab(s) orally 2 times a day  chlorproMAZINE 100 mg oral tablet: 1 tab(s) orally once a day (at bedtime)  chlorproMAZINE 25 mg oral tablet: 1 tab(s) orally once a day in the morning  Flomax 0.4 mg oral capsule: 1 cap(s) orally once a day  MiraLax oral powder for reconstitution: 1 packet(s) orally once a day  Multiple Vitamins oral tablet: 1 tab(s) orally once a day  Myrbetriq 50 mg oral tablet, extended release: 1 tab(s) orally once a day  Pazeo 0.7% ophthalmic solution: 1 drop(s) to each affected eye 2 times a day

## 2021-01-22 NOTE — DISCHARGE NOTE PROVIDER - NSDCCPCAREPLAN_GEN_ALL_CORE_FT
PRINCIPAL DISCHARGE DIAGNOSIS  Diagnosis: Sigmoid volvulus  Assessment and Plan of Treatment:        PRINCIPAL DISCHARGE DIAGNOSIS  Diagnosis: Sigmoid volvulus  Assessment and Plan of Treatment: s/p Exploratory laparotomy, sigmoidectomy, creation of end colostomy on 1/23/2021  WOUND CARE:  Please keep incisions clean and dry. Please do not Scrub or rub incisions. Do not use lotion or powder on incisions.   BATHING: You may shower and/or sponge bathe. You may use warm soapy water in the shower and rinse, pat dry.  ACTIVITY: No heavy lifting or straining. Otherwise, you may return to your usual level of physical activity.  DIET: Diet Dysphagia 1 Pureed-Honey Consistency Fluid  NOTIFY YOUR SURGEON IF YOU HAVE: any bleeding that does not stop, any pus draining from your wound(s), any fever (over 100.4 F) persistent nausea/vomiting, or if your pain is not controlled on your discharge pain medications, unable to urinate.  Please follow up with your primary care physician in one week regarding your hospitalization, bring copies of your discharge paperwork.  Please follow up with your surgeon, Dr. Escobar

## 2021-01-22 NOTE — DISCHARGE NOTE PROVIDER - DETAILS OF MALNUTRITION DIAGNOSIS/DIAGNOSES
This patient has been assessed with a concern for Malnutrition and was treated during this hospitalization for the following Nutrition diagnosis/diagnoses:     -  01/22/2021: Moderate protein-calorie malnutrition   This patient has been assessed with a concern for Malnutrition and was treated during this hospitalization for the following Nutrition diagnosis/diagnoses:     -  01/22/2021: Moderate protein-calorie malnutrition    This patient has been assessed with a concern for Malnutrition and was treated during this hospitalization for the following Nutrition diagnosis/diagnoses:     -  01/22/2021: Moderate protein-calorie malnutrition   This patient has been assessed with a concern for Malnutrition and was treated during this hospitalization for the following Nutrition diagnosis/diagnoses:     -  01/22/2021: Moderate protein-calorie malnutrition    This patient has been assessed with a concern for Malnutrition and was treated during this hospitalization for the following Nutrition diagnosis/diagnoses:     -  01/22/2021: Moderate protein-calorie malnutrition    This patient has been assessed with a concern for Malnutrition and was treated during this hospitalization for the following Nutrition diagnosis/diagnoses:     -  01/22/2021: Moderate protein-calorie malnutrition   This patient has been assessed with a concern for Malnutrition and was treated during this hospitalization for the following Nutrition diagnosis/diagnoses:     -  01/22/2021: Moderate protein-calorie malnutrition    This patient has been assessed with a concern for Malnutrition and was treated during this hospitalization for the following Nutrition diagnosis/diagnoses:     -  01/22/2021: Moderate protein-calorie malnutrition    This patient has been assessed with a concern for Malnutrition and was treated during this hospitalization for the following Nutrition diagnosis/diagnoses:     -  01/22/2021: Moderate protein-calorie malnutrition    This patient has been assessed with a concern for Malnutrition and was treated during this hospitalization for the following Nutrition diagnosis/diagnoses:     -  01/22/2021: Moderate protein-calorie malnutrition   This patient has been assessed with a concern for Malnutrition and was treated during this hospitalization for the following Nutrition diagnosis/diagnoses:     -  01/22/2021: Moderate protein-calorie malnutrition    This patient has been assessed with a concern for Malnutrition and was treated during this hospitalization for the following Nutrition diagnosis/diagnoses:     -  01/22/2021: Moderate protein-calorie malnutrition    This patient has been assessed with a concern for Malnutrition and was treated during this hospitalization for the following Nutrition diagnosis/diagnoses:     -  01/22/2021: Moderate protein-calorie malnutrition    This patient has been assessed with a concern for Malnutrition and was treated during this hospitalization for the following Nutrition diagnosis/diagnoses:     -  01/22/2021: Moderate protein-calorie malnutrition    This patient has been assessed with a concern for Malnutrition and was treated during this hospitalization for the following Nutrition diagnosis/diagnoses:     -  01/22/2021: Moderate protein-calorie malnutrition

## 2021-01-22 NOTE — DISCHARGE NOTE PROVIDER - NSDCFUADDINST_GEN_ALL_CORE_FT
WOUND CARE:  Midline staples to be removed out patient in office on follow up visit this week.  End colostomy care as taught by nurse prior to discharge.  BATHING: Please do not submerge wound underwater. You may shower and/or sponge bathe.  ACTIVITY: No heavy lifting or straining. Otherwise, you may return to your usual level of physical activity. If you are taking narcotic pain medication (such as Percocet) DO NOT drive a car, operate machinery or make important decisions.  DIET: Return to your usual diet.  NOTIFY YOUR SURGEON IF: You have any bleeding that does not stop, any pus draining from your wound(s), any fever (over 100.4 F) or chills, persistent nausea/vomiting, persistent diarrhea, or if your pain is not controlled on your discharge pain medications.  FOLLOW-UP: Please follow up with your primary care physician in one week regarding your hospitalization  WOUND CARE: End colostomy care as taught by nurse prior to discharge.  BATHING: Please do not submerge wound underwater. You may shower and/or sponge bathe.  ACTIVITY: No heavy lifting or straining. Otherwise, you may return to your usual level of physical activity. If you are taking narcotic pain medication (such as Percocet) DO NOT drive a car, operate machinery or make important decisions.  DIET: Return to your usual diet.  NOTIFY YOUR SURGEON IF: You have any bleeding that does not stop, any pus draining from your wound(s), any fever (over 100.4 F) or chills, persistent nausea/vomiting, persistent diarrhea, or if your pain is not controlled on your discharge pain medications.  FOLLOW-UP: Please follow up with your primary care physician in one week regarding your hospitalization

## 2021-01-22 NOTE — ADVANCED PRACTICE NURSE CONSULT - ASSESSMENT
Patient seen for ostomy teaching. Reviewed procedure. Reviewed how often to change pouch and empty pouch. Removed pouch, cleansed skin with water, patted dry. Taught how to properly assess stoma viability and peristomal skin. Group home employees actively participated in stoma measurement, cutting waffer, applying pouch. Reviewed how to open, close, and empty pouch. Reviewed s/s to report to MD. Reviewed products and different options. Discharge supplies provided.
Ostomy pouch changed.     Stoma size: 1 3/4" See A&I flowsheet for full stoma assessment details.

## 2021-01-22 NOTE — DIETITIAN NUTRITION RISK NOTIFICATION - TREATMENT: THE FOLLOWING DIET HAS BEEN RECOMMENDED
Diet, Dysphagia 1 Pureed-Honey Consistency Fluid:   Fiber/Residue Restricted (LOWFIBER)  Supplement Feeding Modality:  Oral  Ensure Pudding Cans or Servings Per Day:  1       Frequency:  Three Times a day (01-22-21 @ 16:18) [Pending Verification By Attending]

## 2021-01-22 NOTE — DIETITIAN INITIAL EVALUATION ADULT. - CHIEF COMPLAINT
The patient is a 55y Male complaining of abdominal pain. 55M with a sigmoid volvulus s/p GI decompression now POD4 s/p ex-lap, sigmoidectomy and creation of end colostomy.

## 2021-01-22 NOTE — PROGRESS NOTE ADULT - ASSESSMENT
55M with a sigmoid volvulus s/p GI decompression now POD4 s/p ex-lap, sigmoidectomy and creation of end colostomy    Plan:  - tolerating regular diet, quantify intake  - mIVF at 50cc/hr   - continue home meds  - pain control  - follow up AM labs, replete electrolytes as necessary  - DVT ppx with lovenox  - Dispo: lives at group home who is willing to teach staff ostomy care; DC after teaching  - Ostomy teaching    B team Surgery  [id number]

## 2021-01-22 NOTE — ADVANCED PRACTICE NURSE CONSULT - RECOMMEDATIONS
Supplies utilized:   Stoma size: 1 3/4"  Stoma powder- item #4022  provided for discharge   Liquid barrier film  provided for discharge   Flat waffer (2 1/4")- item # 92360   Drainable pouch (2 1/4")- item # [id number],     Please contact Wound/Ostomy Care Service Line if we can be of further assistance (ext [phone]).   
Stoma size: 1-3/4"  Appliance used:      Flat waffer (2 1/4")- item # [id number]      Drainable pouch (2 1/4")- item # [id number]

## 2021-01-22 NOTE — DIETITIAN INITIAL EVALUATION ADULT. - OTHER INFO
RD visited with patient for length of stay. Patient non-verbal. RN at bedside, therefore, was able to obtain collateral from RN.  Per RN, patient tolerating Dysphagia 1 diet with honey thick liquids well. Patient not exhibiting any chewing or swallowing difficulties on current diet.  Fed with total assistance.  No reported GI distress. Patient s/p colostomy.  No noted food allergies.  Patient not able to indicate weight history.  Per discussion with RN, patient would likely be accepting of oral supplement - will recommend for Ensure Puddings.

## 2021-01-22 NOTE — DIETITIAN INITIAL EVALUATION ADULT. - ADD RECOMMEND
1) Monitor weights, PO intake/diet tolerance, skin integrity, pertinent labs. 2) Please add MVI 1x daily  if feasible for micronutrient coverage.

## 2021-01-22 NOTE — DISCHARGE NOTE PROVIDER - NSDCCPTREATMENT_GEN_ALL_CORE_FT
PRINCIPAL PROCEDURE  Procedure: Open sigmoidectomy  Findings and Treatment:       SECONDARY PROCEDURE  Procedure: Creation of end colostomy  Findings and Treatment:

## 2021-01-22 NOTE — DIETITIAN INITIAL EVALUATION ADULT. - PERTINENT MEDS FT
MEDICATIONS  (STANDING):  acetaminophen   Tablet .. 650 milliGRAM(s) Oral every 4 hours  benztropine 0.5 milliGRAM(s) Oral two times a day  chlorproMAZINE    Tablet 100 milliGRAM(s) Oral at bedtime  chlorproMAZINE    Tablet 25 milliGRAM(s) Oral daily  enoxaparin Injectable 40 milliGRAM(s) SubCutaneous daily  tamsulosin 0.4 milliGRAM(s) Oral at bedtime    MEDICATIONS  (PRN):

## 2021-01-22 NOTE — PROGRESS NOTE ADULT - ASSESSMENT
55M with a sigmoid volvulus s/p GI decompression now POD4 s/p ex-lap, sigmoidectomy and creation of end colostomy    Plan:  - tolerating pureed diet  - mIVF at 50cc/hr   - continue home meds  - pain control  - follow up AM labs, replete electrolytes as necessary  - DVT ppx with lovenox  - Dispo: lives at group home who is willing to teach staff ostomy care  - Ostomy teaching with group home staff today    B team Surgery  [id number]

## 2021-01-22 NOTE — PROGRESS NOTE ADULT - SUBJECTIVE AND OBJECTIVE BOX
Interval Events:  - No acute events overnight    S: Patient seen and examined at bedside and doing OK. There is some air and bowel sweat in ostomy bag.  Denies fevers, chills, nausea, emesis, chest pain, SOB.    O: Vital Signs  T(C): 36.9 (01-22 @ 04:53), Max: 36.9 (01-22 @ 04:53)  HR: 89 (01-22 @ 04:53) (79 - 89)  BP: 101/68 (01-22 @ 04:53) (101/68 - 114/72)  RR: 17 (01-22 @ 04:53) (16 - 18)  SpO2: 97% (01-22 @ 04:53) (97% - 98%)  01-21-21 @ 07:01  -  01-22-21 @ 07:00  --------------------------------------------------------  IN:  Total IN: 0 mL    OUT:    Incontinent per Collection Bag (mL): 150 mL  Total OUT: 150 mL    Total NET: -150 mL        General: alert and oriented, NAD, developmental delay  Resp: airway patent, respirations unlabored  CVS: appears well perfused  Abdomen: soft, nontender, nondistended; air and minimal bowel sweat in bag  Extremities: no edema  Skin: warm, dry, appropriate color                          13.9   6.60  )-----------( 130      ( 21 Jan 2021 07:52 )             41.7   01-21    138  |  101  |  11  ----------------------------<  119<H>  3.8   |  27  |  0.68    Ca    9.0      21 Jan 2021 07:52  Phos  3.2     01-21  Mg     2.2     01-21

## 2021-01-23 LAB
ANION GAP SERPL CALC-SCNC: 11 MMOL/L — SIGNIFICANT CHANGE UP (ref 7–14)
BLOOD GAS ARTERIAL - LYTES,HGB,ICA,LACT RESULT: SIGNIFICANT CHANGE UP
BUN SERPL-MCNC: 16 MG/DL — SIGNIFICANT CHANGE UP (ref 7–23)
CALCIUM SERPL-MCNC: 8.8 MG/DL — SIGNIFICANT CHANGE UP (ref 8.4–10.5)
CHLORIDE SERPL-SCNC: 100 MMOL/L — SIGNIFICANT CHANGE UP (ref 98–107)
CO2 SERPL-SCNC: 27 MMOL/L — SIGNIFICANT CHANGE UP (ref 22–31)
CREAT SERPL-MCNC: 0.77 MG/DL — SIGNIFICANT CHANGE UP (ref 0.5–1.3)
GLUCOSE SERPL-MCNC: 110 MG/DL — HIGH (ref 70–99)
HCT VFR BLD CALC: 41.7 % — SIGNIFICANT CHANGE UP (ref 39–50)
HGB BLD-MCNC: 13.7 G/DL — SIGNIFICANT CHANGE UP (ref 13–17)
MCHC RBC-ENTMCNC: 30.1 PG — SIGNIFICANT CHANGE UP (ref 27–34)
MCHC RBC-ENTMCNC: 32.9 GM/DL — SIGNIFICANT CHANGE UP (ref 32–36)
MCV RBC AUTO: 91.6 FL — SIGNIFICANT CHANGE UP (ref 80–100)
NRBC # BLD: 0 /100 WBCS — SIGNIFICANT CHANGE UP
NRBC # FLD: 0 K/UL — SIGNIFICANT CHANGE UP
PLATELET # BLD AUTO: 178 K/UL — SIGNIFICANT CHANGE UP (ref 150–400)
POTASSIUM SERPL-MCNC: 4.7 MMOL/L — SIGNIFICANT CHANGE UP (ref 3.5–5.3)
POTASSIUM SERPL-SCNC: 4.7 MMOL/L — SIGNIFICANT CHANGE UP (ref 3.5–5.3)
RBC # BLD: 4.55 M/UL — SIGNIFICANT CHANGE UP (ref 4.2–5.8)
RBC # FLD: 12.4 % — SIGNIFICANT CHANGE UP (ref 10.3–14.5)
SODIUM SERPL-SCNC: 138 MMOL/L — SIGNIFICANT CHANGE UP (ref 135–145)
WBC # BLD: 10.74 K/UL — HIGH (ref 3.8–10.5)
WBC # FLD AUTO: 10.74 K/UL — HIGH (ref 3.8–10.5)

## 2021-01-23 PROCEDURE — 10140 I&D HMTMA SEROMA/FLUID COLLJ: CPT | Mod: 58

## 2021-01-23 RX ORDER — SODIUM CHLORIDE 9 MG/ML
1000 INJECTION, SOLUTION INTRAVENOUS
Refills: 0 | Status: DISCONTINUED | OUTPATIENT
Start: 2021-01-23 | End: 2021-01-24

## 2021-01-23 RX ADMIN — Medication 650 MILLIGRAM(S): at 03:10

## 2021-01-23 RX ADMIN — PIPERACILLIN AND TAZOBACTAM 25 GRAM(S): 4; .5 INJECTION, POWDER, LYOPHILIZED, FOR SOLUTION INTRAVENOUS at 13:11

## 2021-01-23 RX ADMIN — Medication 0.5 MILLIGRAM(S): at 23:20

## 2021-01-23 RX ADMIN — Medication 650 MILLIGRAM(S): at 13:12

## 2021-01-23 RX ADMIN — SODIUM CHLORIDE 60 MILLILITER(S): 9 INJECTION, SOLUTION INTRAVENOUS at 10:07

## 2021-01-23 RX ADMIN — PIPERACILLIN AND TAZOBACTAM 25 GRAM(S): 4; .5 INJECTION, POWDER, LYOPHILIZED, FOR SOLUTION INTRAVENOUS at 20:50

## 2021-01-23 RX ADMIN — Medication 650 MILLIGRAM(S): at 17:01

## 2021-01-23 RX ADMIN — Medication 0.5 MILLIGRAM(S): at 10:07

## 2021-01-23 RX ADMIN — PIPERACILLIN AND TAZOBACTAM 25 GRAM(S): 4; .5 INJECTION, POWDER, LYOPHILIZED, FOR SOLUTION INTRAVENOUS at 06:40

## 2021-01-23 RX ADMIN — ENOXAPARIN SODIUM 40 MILLIGRAM(S): 100 INJECTION SUBCUTANEOUS at 13:11

## 2021-01-23 RX ADMIN — TAMSULOSIN HYDROCHLORIDE 0.4 MILLIGRAM(S): 0.4 CAPSULE ORAL at 23:20

## 2021-01-23 RX ADMIN — Medication 650 MILLIGRAM(S): at 06:40

## 2021-01-23 RX ADMIN — Medication 25 MILLIGRAM(S): at 13:11

## 2021-01-23 RX ADMIN — Medication 650 MILLIGRAM(S): at 10:07

## 2021-01-23 RX ADMIN — Medication 100 MILLIGRAM(S): at 23:20

## 2021-01-23 NOTE — BRIEF OPERATIVE NOTE - NSICDXBRIEFPOSTOP_GEN_ALL_CORE_FT
POST-OP DIAGNOSIS:  Abdominal wall hematoma 23-Jan-2021 01:42:00  Sylvia Munroe  
POST-OP DIAGNOSIS:  Sigmoid volvulus 18-Jan-2021 16:42:20  Negra Del Toro

## 2021-01-23 NOTE — PROGRESS NOTE ADULT - ASSESSMENT
55M with a sigmoid volvulus s/p GI decompression, s/p ex-lap, sigmoidectomy and creation of end colostomy, now POD0 s/p an evacuation of ABD hematoma     Plan:  - CLD  - LR IVF  - continue home meds  - pain control  - follow up AM labs, replete electrolytes as necessary  - DVT ppx with lovenox  - Dispo: lives at group home who is willing to teach staff ostomy care    B team Surgery  [id number] 55M with a sigmoid volvulus s/p GI decompression, s/p ex-lap, sigmoidectomy and creation of end colostomy, now POD0 s/p an evacuation of ABD hematoma     Plan:  - CLD  - D/C [place]  - LR IVF  - continue home meds  - pain control  - follow up AM labs, replete electrolytes as necessary  - DVT ppx with lovenox  - Dispo: lives at group home who is willing to teach staff ostomy care    B team Surgery  [id number]

## 2021-01-23 NOTE — BRIEF OPERATIVE NOTE - NSICDXBRIEFPREOP_GEN_ALL_CORE_FT
PRE-OP DIAGNOSIS:  Sigmoid volvulus 18-Jan-2021 16:42:10  Negra Del Toro  
PRE-OP DIAGNOSIS:  Abdominal wall hematoma 23-Jan-2021 01:41:48  Sylvia Munroe

## 2021-01-23 NOTE — BRIEF OPERATIVE NOTE - OPERATION/FINDINGS
Exlap, sigmoidectomy, creation of end colostomy.
evacuation of abdominal wall hematoma     findings: pulsatile arterial bleed from rectus muscle on the superior part of the incision, controlled with suture ligature. Oozing from raw surface controlled with hemostatic agents ([id number] and Kristine). Penrose drain placed.

## 2021-01-24 LAB
ANION GAP SERPL CALC-SCNC: 8 MMOL/L — SIGNIFICANT CHANGE UP (ref 7–14)
BUN SERPL-MCNC: 12 MG/DL — SIGNIFICANT CHANGE UP (ref 7–23)
CALCIUM SERPL-MCNC: 8.5 MG/DL — SIGNIFICANT CHANGE UP (ref 8.4–10.5)
CHLORIDE SERPL-SCNC: 101 MMOL/L — SIGNIFICANT CHANGE UP (ref 98–107)
CO2 SERPL-SCNC: 29 MMOL/L — SIGNIFICANT CHANGE UP (ref 22–31)
CREAT SERPL-MCNC: 0.69 MG/DL — SIGNIFICANT CHANGE UP (ref 0.5–1.3)
GLUCOSE SERPL-MCNC: 89 MG/DL — SIGNIFICANT CHANGE UP (ref 70–99)
HCT VFR BLD CALC: 35.7 % — LOW (ref 39–50)
HGB BLD-MCNC: 11.5 G/DL — LOW (ref 13–17)
MAGNESIUM SERPL-MCNC: 2 MG/DL — SIGNIFICANT CHANGE UP (ref 1.6–2.6)
MCHC RBC-ENTMCNC: 29.9 PG — SIGNIFICANT CHANGE UP (ref 27–34)
MCHC RBC-ENTMCNC: 32.2 GM/DL — SIGNIFICANT CHANGE UP (ref 32–36)
MCV RBC AUTO: 92.7 FL — SIGNIFICANT CHANGE UP (ref 80–100)
NRBC # BLD: 0 /100 WBCS — SIGNIFICANT CHANGE UP
NRBC # FLD: 0 K/UL — SIGNIFICANT CHANGE UP
PHOSPHATE SERPL-MCNC: 2.7 MG/DL — SIGNIFICANT CHANGE UP (ref 2.5–4.5)
PLATELET # BLD AUTO: 170 K/UL — SIGNIFICANT CHANGE UP (ref 150–400)
POTASSIUM SERPL-MCNC: 3.4 MMOL/L — LOW (ref 3.5–5.3)
POTASSIUM SERPL-SCNC: 3.4 MMOL/L — LOW (ref 3.5–5.3)
RBC # BLD: 3.85 M/UL — LOW (ref 4.2–5.8)
RBC # FLD: 12.4 % — SIGNIFICANT CHANGE UP (ref 10.3–14.5)
SODIUM SERPL-SCNC: 138 MMOL/L — SIGNIFICANT CHANGE UP (ref 135–145)
WBC # BLD: 4.13 K/UL — SIGNIFICANT CHANGE UP (ref 3.8–10.5)
WBC # FLD AUTO: 4.13 K/UL — SIGNIFICANT CHANGE UP (ref 3.8–10.5)

## 2021-01-24 RX ORDER — SODIUM CHLORIDE 9 MG/ML
1000 INJECTION, SOLUTION INTRAVENOUS
Refills: 0 | Status: DISCONTINUED | OUTPATIENT
Start: 2021-01-24 | End: 2021-01-25

## 2021-01-24 RX ORDER — POTASSIUM CHLORIDE 20 MEQ
10 PACKET (EA) ORAL
Refills: 0 | Status: DISCONTINUED | OUTPATIENT
Start: 2021-01-24 | End: 2021-01-24

## 2021-01-24 RX ORDER — SODIUM,POTASSIUM PHOSPHATES 278-250MG
1 POWDER IN PACKET (EA) ORAL ONCE
Refills: 0 | Status: COMPLETED | OUTPATIENT
Start: 2021-01-24 | End: 2021-01-24

## 2021-01-24 RX ORDER — POTASSIUM CHLORIDE 20 MEQ
40 PACKET (EA) ORAL ONCE
Refills: 0 | Status: COMPLETED | OUTPATIENT
Start: 2021-01-24 | End: 2021-01-24

## 2021-01-24 RX ADMIN — SODIUM CHLORIDE 125 MILLILITER(S): 9 INJECTION, SOLUTION INTRAVENOUS at 18:25

## 2021-01-24 RX ADMIN — TAMSULOSIN HYDROCHLORIDE 0.4 MILLIGRAM(S): 0.4 CAPSULE ORAL at 21:56

## 2021-01-24 RX ADMIN — Medication 650 MILLIGRAM(S): at 04:57

## 2021-01-24 RX ADMIN — Medication 0.5 MILLIGRAM(S): at 21:56

## 2021-01-24 RX ADMIN — Medication 100 MILLIEQUIVALENT(S): at 14:30

## 2021-01-24 RX ADMIN — Medication 1 PACKET(S): at 14:31

## 2021-01-24 RX ADMIN — Medication 25 MILLIGRAM(S): at 14:31

## 2021-01-24 RX ADMIN — Medication 100 MILLIGRAM(S): at 21:57

## 2021-01-24 RX ADMIN — Medication 650 MILLIGRAM(S): at 14:31

## 2021-01-24 RX ADMIN — Medication 0.5 MILLIGRAM(S): at 14:31

## 2021-01-24 RX ADMIN — ENOXAPARIN SODIUM 40 MILLIGRAM(S): 100 INJECTION SUBCUTANEOUS at 14:31

## 2021-01-24 RX ADMIN — Medication 650 MILLIGRAM(S): at 18:12

## 2021-01-24 RX ADMIN — Medication 650 MILLIGRAM(S): at 21:58

## 2021-01-24 RX ADMIN — Medication 40 MILLIEQUIVALENT(S): at 18:11

## 2021-01-24 RX ADMIN — PIPERACILLIN AND TAZOBACTAM 25 GRAM(S): 4; .5 INJECTION, POWDER, LYOPHILIZED, FOR SOLUTION INTRAVENOUS at 04:57

## 2021-01-24 NOTE — PROGRESS NOTE ADULT - SUBJECTIVE AND OBJECTIVE BOX
Interval Events:  - No acute events overnight  S: Patient doing well. Pt nonverbal, unable to assess for fevers, chills, nausea, emesis, chest pain, SOB.    O: Vital Signs  T(C): 36.7 (01-24 @ 04:52), Max: 37.6 (01-23 @ 18:25)  HR: 82 (01-24 @ 04:52) (82 - 94)  BP: 92/56 (01-24 @ 04:52) (92/56 - 105/79)  RR: 16 (01-24 @ 04:52) (16 - 17)  SpO2: 100% (01-24 @ 04:52) (98% - 100%)  01-23-21 @ 07:01  -  01-24-21 @ 07:00  --------------------------------------------------------  IN:  Total IN: 0 mL    OUT:    Colostomy (mL): 0 mL    Indwelling Catheter - Urethral (mL): 180 mL    Voided (mL): 800 mL  Total OUT: 980 mL    Total NET: -980 mL        General: developmental delay, NAD  Resp: airway patent, respirations unlabored  CVS: appears well perfused  Abdomen: soft, appropriately tender, nondistended; incisions c/d/i; ostomy with only air in bag                          11.5   4.13  )-----------( 170      ( 24 Jan 2021 05:53 )             35.7   01-24    138  |  101  |  12  ----------------------------<  89  3.4<L>   |  29  |  0.69    Ca    8.5      24 Jan 2021 05:53  Phos  2.7     01-24  Mg     2.0     01-24

## 2021-01-24 NOTE — PROGRESS NOTE ADULT - ASSESSMENT
55M with a sigmoid volvulus s/p GI decompression, s/p ex-lap, sigmoidectomy and creation of end colostomy, now POD1 s/p an evacuation of ABD hematoma, recovering well on the floors    Plan:  - C/w dysphagia diet  - LR IVF  - continue home meds  - pain control  - follow up AM labs, replete electrolytes as necessary  - DVT ppx with lovenox  - Dispo: lives at group home who is willing to teach staff ostomy care    B team Surgery  [id number]

## 2021-01-25 LAB
ANION GAP SERPL CALC-SCNC: 7 MMOL/L — SIGNIFICANT CHANGE UP (ref 7–14)
BUN SERPL-MCNC: 10 MG/DL — SIGNIFICANT CHANGE UP (ref 7–23)
CALCIUM SERPL-MCNC: 8.1 MG/DL — LOW (ref 8.4–10.5)
CHLORIDE SERPL-SCNC: 103 MMOL/L — SIGNIFICANT CHANGE UP (ref 98–107)
CO2 SERPL-SCNC: 28 MMOL/L — SIGNIFICANT CHANGE UP (ref 22–31)
CREAT SERPL-MCNC: 0.64 MG/DL — SIGNIFICANT CHANGE UP (ref 0.5–1.3)
GLUCOSE BLDC GLUCOMTR-MCNC: 76 MG/DL — SIGNIFICANT CHANGE UP (ref 70–99)
GLUCOSE BLDC GLUCOMTR-MCNC: 94 MG/DL — SIGNIFICANT CHANGE UP (ref 70–99)
GLUCOSE SERPL-MCNC: 84 MG/DL — SIGNIFICANT CHANGE UP (ref 70–99)
HCT VFR BLD CALC: 34.1 % — LOW (ref 39–50)
HGB BLD-MCNC: 11.5 G/DL — LOW (ref 13–17)
MAGNESIUM SERPL-MCNC: 1.8 MG/DL — SIGNIFICANT CHANGE UP (ref 1.6–2.6)
MCHC RBC-ENTMCNC: 30.3 PG — SIGNIFICANT CHANGE UP (ref 27–34)
MCHC RBC-ENTMCNC: 33.7 GM/DL — SIGNIFICANT CHANGE UP (ref 32–36)
MCV RBC AUTO: 90 FL — SIGNIFICANT CHANGE UP (ref 80–100)
NRBC # BLD: 0 /100 WBCS — SIGNIFICANT CHANGE UP
NRBC # FLD: 0 K/UL — SIGNIFICANT CHANGE UP
PHOSPHATE SERPL-MCNC: 2.8 MG/DL — SIGNIFICANT CHANGE UP (ref 2.5–4.5)
PLATELET # BLD AUTO: 166 K/UL — SIGNIFICANT CHANGE UP (ref 150–400)
POTASSIUM SERPL-MCNC: 3.7 MMOL/L — SIGNIFICANT CHANGE UP (ref 3.5–5.3)
POTASSIUM SERPL-SCNC: 3.7 MMOL/L — SIGNIFICANT CHANGE UP (ref 3.5–5.3)
RBC # BLD: 3.79 M/UL — LOW (ref 4.2–5.8)
RBC # FLD: 12.6 % — SIGNIFICANT CHANGE UP (ref 10.3–14.5)
SODIUM SERPL-SCNC: 138 MMOL/L — SIGNIFICANT CHANGE UP (ref 135–145)
WBC # BLD: 3.01 K/UL — LOW (ref 3.8–10.5)
WBC # FLD AUTO: 3.01 K/UL — LOW (ref 3.8–10.5)

## 2021-01-25 RX ORDER — POLYETHYLENE GLYCOL 3350 17 G/17G
17 POWDER, FOR SOLUTION ORAL DAILY
Refills: 0 | Status: DISCONTINUED | OUTPATIENT
Start: 2021-01-25 | End: 2021-02-04

## 2021-01-25 RX ORDER — SODIUM CHLORIDE 9 MG/ML
1000 INJECTION, SOLUTION INTRAVENOUS
Refills: 0 | Status: DISCONTINUED | OUTPATIENT
Start: 2021-01-25 | End: 2021-01-25

## 2021-01-25 RX ORDER — MAGNESIUM SULFATE 500 MG/ML
2 VIAL (ML) INJECTION ONCE
Refills: 0 | Status: COMPLETED | OUTPATIENT
Start: 2021-01-25 | End: 2021-01-25

## 2021-01-25 RX ORDER — SODIUM,POTASSIUM PHOSPHATES 278-250MG
1 POWDER IN PACKET (EA) ORAL ONCE
Refills: 0 | Status: COMPLETED | OUTPATIENT
Start: 2021-01-25 | End: 2021-01-25

## 2021-01-25 RX ADMIN — Medication 650 MILLIGRAM(S): at 21:59

## 2021-01-25 RX ADMIN — Medication 25 MILLIGRAM(S): at 13:02

## 2021-01-25 RX ADMIN — Medication 650 MILLIGRAM(S): at 17:50

## 2021-01-25 RX ADMIN — TAMSULOSIN HYDROCHLORIDE 0.4 MILLIGRAM(S): 0.4 CAPSULE ORAL at 21:59

## 2021-01-25 RX ADMIN — Medication 650 MILLIGRAM(S): at 13:01

## 2021-01-25 RX ADMIN — Medication 100 MILLIGRAM(S): at 21:59

## 2021-01-25 RX ADMIN — Medication 50 GRAM(S): at 13:01

## 2021-01-25 RX ADMIN — Medication 1 PACKET(S): at 13:01

## 2021-01-25 RX ADMIN — POLYETHYLENE GLYCOL 3350 17 GRAM(S): 17 POWDER, FOR SOLUTION ORAL at 13:01

## 2021-01-25 RX ADMIN — Medication 0.5 MILLIGRAM(S): at 21:59

## 2021-01-25 RX ADMIN — Medication 0.5 MILLIGRAM(S): at 13:01

## 2021-01-25 RX ADMIN — Medication 650 MILLIGRAM(S): at 06:38

## 2021-01-25 RX ADMIN — ENOXAPARIN SODIUM 40 MILLIGRAM(S): 100 INJECTION SUBCUTANEOUS at 13:02

## 2021-01-25 NOTE — PROGRESS NOTE ADULT - SUBJECTIVE AND OBJECTIVE BOX
Interval Events:  - No acute events overnight    S: Patient seen and examined at bedside and doing well.  Nursing staff state they have been feeding him and he's eating the entire tray.  Pt has been mildly hypotensive SBP in the 80s but otherwise asymptomatic.  Unable to assess for fevers, chills, nausea, emesis, chest pain, SOB as pt is nonverbal.    O: Vital Signs  T(C): 36.4 (01-25 @ 06:37), Max: 36.9 (01-24 @ 20:50)  HR: 76 (01-25 @ 06:37) (76 - 98)  BP: 95/64 (01-25 @ 06:37) (86/52 - 95/64)  RR: 17 (01-25 @ 06:37) (15 - 17)  SpO2: 96% (01-25 @ 06:37) (95% - 100%)  01-24-21 @ 07:01  -  01-25-21 @ 07:00  --------------------------------------------------------  IN:  Total IN: 0 mL    OUT:    Incontinent per Collection Bag (mL): 1650 mL  Total OUT: 1650 mL    Total NET: -1650 mL        General: developmentally delayed, NAD  Resp: airway patent, respirations unlabored  CVS: appears well perfused  Abdomen: soft, nontender, nondistended; incisions c/d/i; ostomy is pink/viable with only gas in bag; minimal serous drainage from midline incision  Extremities: no edema  Skin: warm, dry, appropriate color                          11.5   4.13  )-----------( 170      ( 24 Jan 2021 05:53 )             35.7   01-24    138  |  101  |  12  ----------------------------<  89  3.4<L>   |  29  |  0.69    Ca    8.5      24 Jan 2021 05:53  Phos  2.7     01-24  Mg     2.0     01-24

## 2021-01-26 LAB
ANION GAP SERPL CALC-SCNC: 8 MMOL/L — SIGNIFICANT CHANGE UP (ref 7–14)
BUN SERPL-MCNC: 10 MG/DL — SIGNIFICANT CHANGE UP (ref 7–23)
CALCIUM SERPL-MCNC: 8.1 MG/DL — LOW (ref 8.4–10.5)
CHLORIDE SERPL-SCNC: 104 MMOL/L — SIGNIFICANT CHANGE UP (ref 98–107)
CO2 SERPL-SCNC: 27 MMOL/L — SIGNIFICANT CHANGE UP (ref 22–31)
CREAT SERPL-MCNC: 0.64 MG/DL — SIGNIFICANT CHANGE UP (ref 0.5–1.3)
GLUCOSE BLDC GLUCOMTR-MCNC: 98 MG/DL — SIGNIFICANT CHANGE UP (ref 70–99)
GLUCOSE SERPL-MCNC: 91 MG/DL — SIGNIFICANT CHANGE UP (ref 70–99)
HCT VFR BLD CALC: 34.6 % — LOW (ref 39–50)
HGB BLD-MCNC: 11.2 G/DL — LOW (ref 13–17)
MAGNESIUM SERPL-MCNC: 2.2 MG/DL — SIGNIFICANT CHANGE UP (ref 1.6–2.6)
MCHC RBC-ENTMCNC: 30 PG — SIGNIFICANT CHANGE UP (ref 27–34)
MCHC RBC-ENTMCNC: 32.4 GM/DL — SIGNIFICANT CHANGE UP (ref 32–36)
MCV RBC AUTO: 92.8 FL — SIGNIFICANT CHANGE UP (ref 80–100)
NRBC # BLD: 0 /100 WBCS — SIGNIFICANT CHANGE UP
NRBC # FLD: 0 K/UL — SIGNIFICANT CHANGE UP
PHOSPHATE SERPL-MCNC: 3.7 MG/DL — SIGNIFICANT CHANGE UP (ref 2.5–4.5)
PLATELET # BLD AUTO: 204 K/UL — SIGNIFICANT CHANGE UP (ref 150–400)
POTASSIUM SERPL-MCNC: 3.5 MMOL/L — SIGNIFICANT CHANGE UP (ref 3.5–5.3)
POTASSIUM SERPL-SCNC: 3.5 MMOL/L — SIGNIFICANT CHANGE UP (ref 3.5–5.3)
RBC # BLD: 3.73 M/UL — LOW (ref 4.2–5.8)
RBC # FLD: 12.5 % — SIGNIFICANT CHANGE UP (ref 10.3–14.5)
SARS-COV-2 RNA SPEC QL NAA+PROBE: SIGNIFICANT CHANGE UP
SODIUM SERPL-SCNC: 139 MMOL/L — SIGNIFICANT CHANGE UP (ref 135–145)
WBC # BLD: 3.78 K/UL — LOW (ref 3.8–10.5)
WBC # FLD AUTO: 3.78 K/UL — LOW (ref 3.8–10.5)

## 2021-01-26 RX ORDER — POTASSIUM CHLORIDE 20 MEQ
40 PACKET (EA) ORAL ONCE
Refills: 0 | Status: COMPLETED | OUTPATIENT
Start: 2021-01-26 | End: 2021-01-26

## 2021-01-26 RX ADMIN — Medication 25 MILLIGRAM(S): at 10:22

## 2021-01-26 RX ADMIN — Medication 40 MILLIEQUIVALENT(S): at 10:22

## 2021-01-26 RX ADMIN — Medication 650 MILLIGRAM(S): at 03:33

## 2021-01-26 RX ADMIN — TAMSULOSIN HYDROCHLORIDE 0.4 MILLIGRAM(S): 0.4 CAPSULE ORAL at 22:57

## 2021-01-26 RX ADMIN — Medication 650 MILLIGRAM(S): at 22:56

## 2021-01-26 RX ADMIN — ENOXAPARIN SODIUM 40 MILLIGRAM(S): 100 INJECTION SUBCUTANEOUS at 10:22

## 2021-01-26 RX ADMIN — Medication 0.5 MILLIGRAM(S): at 22:57

## 2021-01-26 RX ADMIN — Medication 650 MILLIGRAM(S): at 14:05

## 2021-01-26 RX ADMIN — Medication 650 MILLIGRAM(S): at 18:39

## 2021-01-26 RX ADMIN — Medication 650 MILLIGRAM(S): at 07:27

## 2021-01-26 RX ADMIN — POLYETHYLENE GLYCOL 3350 17 GRAM(S): 17 POWDER, FOR SOLUTION ORAL at 10:23

## 2021-01-26 RX ADMIN — Medication 0.5 MILLIGRAM(S): at 10:22

## 2021-01-26 RX ADMIN — Medication 100 MILLIGRAM(S): at 22:57

## 2021-01-26 RX ADMIN — Medication 650 MILLIGRAM(S): at 10:22

## 2021-01-26 NOTE — PHYSICAL THERAPY INITIAL EVALUATION ADULT - GENERAL OBSERVATIONS, REHAB EVAL
Patient received semi supine in bed, (+) non verbal , (+) barnhart , (+) colostomy pouch BP supine 96/61 seated 95/60 standing 87/62 , pt is unable to verbalize any concerns.

## 2021-01-26 NOTE — PROGRESS NOTE ADULT - ASSESSMENT
55M with a sigmoid volvulus s/p GI decompression, s/p ex-lap, sigmoidectomy and creation of end colostomy, now POD3 s/p an evacuation of ABD hematoma, recovering well on the floors    Plan:  - C/w dysphagia diet  - continue miralax  - C/w home meds  - Pain control  - F/u AM labs, replete electrolytes as necessary  - DVT ppx with lovenox  - Dispo: lives at group home who is willing to teach staff ostomy care, dc home today    B team Surgery [id number] 55M with a sigmoid volvulus s/p GI decompression, s/p ex-lap, sigmoidectomy and creation of end colostomy, now POD3 s/p an evacuation of ABD hematoma, recovering well on the floors    Plan:  - C/w dysphagia diet  - continue miralax  - C/w home meds  - Pain control  - F/u AM labs, replete electrolytes as necessary  - DVT ppx with lovenox  - Dispo: lives at group home who is willing to teach staff ostomy care, dc home today  - fu COVID PCR and PT consult (steps in home) prior to dc    B team Surgery [id number]

## 2021-01-26 NOTE — PHYSICAL THERAPY INITIAL EVALUATION ADULT - LEVEL OF INDEPENDENCE: GAIT, REHAB EVAL
due to patient with BP 87/62 and is unable to verbalize any concerns and pt sat down quickly/unable to perform

## 2021-01-26 NOTE — PHYSICAL THERAPY INITIAL EVALUATION ADULT - ACTIVE RANGE OF MOTION EXAMINATION, REHAB EVAL
except bilateral shoulder flexion 0-90 degrees/bilateral upper extremity Active ROM was WFL (within functional limits)/bilateral  lower extremity Active ROM was WFL (within functional limits)

## 2021-01-26 NOTE — PHYSICAL THERAPY INITIAL EVALUATION ADULT - ADDITIONAL COMMENTS
Information about patient's PLOF and SHx obtained from SW note , pt is unable to provide any information.

## 2021-01-26 NOTE — PROGRESS NOTE ADULT - SUBJECTIVE AND OBJECTIVE BOX
TEAM [ B ] Surgery Daily Progress Note  =====================================================    SUBJECTIVE: Patient seen and examined at bedside on AM rounds. Subjective limited secondary to clinical condition.    PAST MEDICAL & SURGICAL HISTORY:  Seizure disorder  Autism disorder  No significant past surgical history    ALLERGIES:  No Known Allergies    --------------------------------------------------------------------------------------    MEDICATIONS:    Neurologic Medications  acetaminophen   Tablet .. 650 milliGRAM(s) Oral every 4 hours  benztropine 0.5 milliGRAM(s) Oral two times a day  chlorproMAZINE    Tablet 100 milliGRAM(s) Oral at bedtime  chlorproMAZINE    Tablet 25 milliGRAM(s) Oral daily    Cardiovascular Medications  tamsulosin 0.4 milliGRAM(s) Oral at bedtime    Gastrointestinal Medications  polyethylene glycol 3350 17 Gram(s) Oral daily    Hematologic/Oncologic Medications  enoxaparin Injectable 40 milliGRAM(s) SubCutaneous daily    --------------------------------------------------------------------------------------    VITAL SIGNS:  ICU Vital Signs Last 24 Hrs  T(C): 36.8 (26 Jan 2021 07:28), Max: 37 (25 Jan 2021 16:44)  T(F): 98.3 (26 Jan 2021 07:28), Max: 98.6 (25 Jan 2021 16:44)  HR: 72 (26 Jan 2021 07:28) (72 - 85)  BP: 96/61 (26 Jan 2021 07:28) (92/64 - 111/73)  RR: 18 (26 Jan 2021 07:28) (18 - 18)  SpO2: 100% (26 Jan 2021 07:28) (96% - 100%)      --------------------------------------------------------------------------------------    EXAM    General: NAD, resting in bed comfortably.  Cardiac: regular rate, warm and well perfused  Respiratory: Nonlabored respirations, normal cw expansion.  Abdomen: soft, nontender, nondistended.   Extremities: no edema, no deformities.    --------------------------------------------------------------------------------------    LABS    ((Insert SICU Labs here))***    --------------------------------------------------------------------------------------    INS AND OUTS:    I&O's Detail    25 Jan 2021 07:01  -  26 Jan 2021 07:00  --------------------------------------------------------  IN:  Total IN: 0 mL    OUT:    Incontinent per Collection Bag (mL): 1350 mL  Total OUT: 1350 mL    Total NET: -1350 mL        -------------------------------------------------------------------------------------- TEAM [ B ] Surgery Daily Progress Note  =====================================================    SUBJECTIVE: Patient seen and examined at bedside on AM rounds. Subjective limited secondary to clinical condition.    PAST MEDICAL & SURGICAL HISTORY:  Seizure disorder  Autism disorder  No significant past surgical history    ALLERGIES:  No Known Allergies    --------------------------------------------------------------------------------------    MEDICATIONS:    Neurologic Medications  acetaminophen   Tablet .. 650 milliGRAM(s) Oral every 4 hours  benztropine 0.5 milliGRAM(s) Oral two times a day  chlorproMAZINE    Tablet 100 milliGRAM(s) Oral at bedtime  chlorproMAZINE    Tablet 25 milliGRAM(s) Oral daily    Cardiovascular Medications  tamsulosin 0.4 milliGRAM(s) Oral at bedtime    Gastrointestinal Medications  polyethylene glycol 3350 17 Gram(s) Oral daily    Hematologic/Oncologic Medications  enoxaparin Injectable 40 milliGRAM(s) SubCutaneous daily    --------------------------------------------------------------------------------------    VITAL SIGNS:  ICU Vital Signs Last 24 Hrs  T(C): 36.8 (26 Jan 2021 07:28), Max: 37 (25 Jan 2021 16:44)  T(F): 98.3 (26 Jan 2021 07:28), Max: 98.6 (25 Jan 2021 16:44)  HR: 72 (26 Jan 2021 07:28) (72 - 85)  BP: 96/61 (26 Jan 2021 07:28) (92/64 - 111/73)  RR: 18 (26 Jan 2021 07:28) (18 - 18)  SpO2: 100% (26 Jan 2021 07:28) (96% - 100%)      --------------------------------------------------------------------------------------    EXAM    General: NAD, resting in bed comfortably.  Cardiac: regular rate, warm and well perfused  Respiratory: Nonlabored respirations, normal cw expansion.  Abdomen: soft, nontender, nondistended.   Extremities: no edema, no deformities.    --------------------------------------------------------------------------------------    LABS                          11.2   3.78  )-----------( 204      ( 26 Jan 2021 06:24 )             34.6     01-26    139  |  104  |  10  ----------------------------<  91  3.5   |  27  |  0.64    Ca    8.1<L>      26 Jan 2021 06:24  Phos  3.7     01-26  Mg     2.2     01-26        --------------------------------------------------------------------------------------    INS AND OUTS:    I&O's Detail    25 Jan 2021 07:01  -  26 Jan 2021 07:00  --------------------------------------------------------  IN:  Total IN: 0 mL    OUT:    Incontinent per Collection Bag (mL): 1350 mL  Total OUT: 1350 mL    Total NET: -1350 mL        --------------------------------------------------------------------------------------

## 2021-01-26 NOTE — PHYSICAL THERAPY INITIAL EVALUATION ADULT - PERTINENT HX OF CURRENT PROBLEM, REHAB EVAL
This is a 55 year old male presenting with sigmoid volvulus s/p GI decompression and s/p ex-lap, sigmoidectomy and creation of end colostomy on 1/18/21 and s/p Evacuation of hematoma of abdomen on 1/23/21.

## 2021-01-27 RX ADMIN — Medication 650 MILLIGRAM(S): at 17:42

## 2021-01-27 RX ADMIN — Medication 650 MILLIGRAM(S): at 22:52

## 2021-01-27 RX ADMIN — Medication 650 MILLIGRAM(S): at 05:22

## 2021-01-27 RX ADMIN — Medication 0.5 MILLIGRAM(S): at 22:52

## 2021-01-27 RX ADMIN — Medication 650 MILLIGRAM(S): at 10:28

## 2021-01-27 RX ADMIN — ENOXAPARIN SODIUM 40 MILLIGRAM(S): 100 INJECTION SUBCUTANEOUS at 13:01

## 2021-01-27 RX ADMIN — POLYETHYLENE GLYCOL 3350 17 GRAM(S): 17 POWDER, FOR SOLUTION ORAL at 13:01

## 2021-01-27 RX ADMIN — TAMSULOSIN HYDROCHLORIDE 0.4 MILLIGRAM(S): 0.4 CAPSULE ORAL at 22:52

## 2021-01-27 RX ADMIN — Medication 0.5 MILLIGRAM(S): at 10:28

## 2021-01-27 RX ADMIN — Medication 100 MILLIGRAM(S): at 22:52

## 2021-01-27 RX ADMIN — Medication 25 MILLIGRAM(S): at 13:01

## 2021-01-27 NOTE — PROGRESS NOTE ADULT - ASSESSMENT
55M with a sigmoid volvulus s/p GI decompression, s/p ex-lap, sigmoidectomy and creation of end colostomy, now POD4 s/p an evacuation of ABD hematoma, recovering well on the floors    Plan:  - C/w dysphagia diet  - continue miralax  - C/w home meds  - Pain control  - F/u AM labs, replete electrolytes as necessary  - DVT ppx with lovenox  - Dispo: lives at group home who is willing to teach staff ostomy care, dc home today  - **PT to reevaluate today  - COVID PCR negative    B team Surgery [id number]

## 2021-01-27 NOTE — PROGRESS NOTE ADULT - SUBJECTIVE AND OBJECTIVE BOX
TEAM [ B ] Surgery Daily Progress Note  =====================================================    SUBJECTIVE: Patient seen and examined at bedside on AM rounds. Subjective limited secondary to clinical condition.    PAST MEDICAL & SURGICAL HISTORY:  Seizure disorder  Autism disorder  No significant past surgical history    ALLERGIES:  No Known Allergies    --------------------------------------------------------------------------------------    MEDICATIONS:    Neurologic Medications  acetaminophen   Tablet .. 650 milliGRAM(s) Oral every 4 hours  benztropine 0.5 milliGRAM(s) Oral two times a day  chlorproMAZINE    Tablet 100 milliGRAM(s) Oral at bedtime  chlorproMAZINE    Tablet 25 milliGRAM(s) Oral daily    Cardiovascular Medications  tamsulosin 0.4 milliGRAM(s) Oral at bedtime    Gastrointestinal Medications  polyethylene glycol 3350 17 Gram(s) Oral daily    Hematologic/Oncologic Medications  enoxaparin Injectable 40 milliGRAM(s) SubCutaneous daily    --------------------------------------------------------------------------------------    VITAL SIGNS:  ICU Vital Signs Last 24 Hrs  T(C): 37 (27 Jan 2021 04:18), Max: 37.1 (26 Jan 2021 14:43)  T(F): 98.6 (27 Jan 2021 04:18), Max: 98.8 (26 Jan 2021 22:23)  HR: 84 (27 Jan 2021 04:18) (70 - 88)  BP: 98/60 (27 Jan 2021 04:18) (97/57 - 98/63)  RR: 17 (27 Jan 2021 04:18) (17 - 18)  SpO2: 99% (27 Jan 2021 04:18) (97% - 99%)    --------------------------------------------------------------------------------------    EXAM    General: NAD, resting in bed comfortably.  Cardiac: regular rate, warm and well perfused  Respiratory: Nonlabored respirations, normal cw expansion.  Abdomen: soft, nontender, nondistended.   Extremities: no edema, no deformities.    --------------------------------------------------------------------------------------    LABS                               --------------------------------------------------------------------------------------    INS AND OUTS:  I&O's Detail    26 Jan 2021 07:01  -  27 Jan 2021 07:00  --------------------------------------------------------  IN:  Total IN: 0 mL    OUT:    Incontinent per Collection Bag (mL): 200 mL  Total OUT: 200 mL    Total NET: -200 mL            --------------------------------------------------------------------------------------

## 2021-01-28 RX ADMIN — Medication 0.5 MILLIGRAM(S): at 21:50

## 2021-01-28 RX ADMIN — Medication 650 MILLIGRAM(S): at 11:35

## 2021-01-28 RX ADMIN — Medication 650 MILLIGRAM(S): at 17:33

## 2021-01-28 RX ADMIN — ENOXAPARIN SODIUM 40 MILLIGRAM(S): 100 INJECTION SUBCUTANEOUS at 11:36

## 2021-01-28 RX ADMIN — POLYETHYLENE GLYCOL 3350 17 GRAM(S): 17 POWDER, FOR SOLUTION ORAL at 11:36

## 2021-01-28 RX ADMIN — Medication 0.5 MILLIGRAM(S): at 11:35

## 2021-01-28 RX ADMIN — Medication 25 MILLIGRAM(S): at 11:36

## 2021-01-28 RX ADMIN — Medication 650 MILLIGRAM(S): at 05:39

## 2021-01-28 RX ADMIN — Medication 100 MILLIGRAM(S): at 21:50

## 2021-01-28 RX ADMIN — Medication 650 MILLIGRAM(S): at 21:50

## 2021-01-28 RX ADMIN — TAMSULOSIN HYDROCHLORIDE 0.4 MILLIGRAM(S): 0.4 CAPSULE ORAL at 21:50

## 2021-01-28 NOTE — PROGRESS NOTE ADULT - SUBJECTIVE AND OBJECTIVE BOX
GENERAL SURGERY PROGRESS NOTE    SUBJECTIVE  Patient seen and examined. No acute events overnight.  Denies fever, chills, SOB, chest pain.         OBJECTIVE    PHYSICAL EXAM  General: Appears well, NAD  CHEST: breathing comfortably  CV: appears well perfused  Abdomen: soft, nontender, nondistended, no rebound or guarding, ostomy pink and viable, air and stool in bag  Extremities: Grossly symmetric    T(C): 36.8 (01-28-21 @ 03:46), Max: 36.9 (01-27-21 @ 14:27)  HR: 82 (01-28-21 @ 03:46) (82 - 100)  BP: 105/69 (01-28-21 @ 03:46) (97/65 - 105/69)  RR: 17 (01-28-21 @ 03:46) (17 - 18)  SpO2: 98% (01-28-21 @ 03:46) (96% - 99%)    01-27-21 @ 07:01  -  01-28-21 @ 07:00  --------------------------------------------------------  IN: 0 mL / OUT: 2250 mL / NET: -2250 mL        MEDICATIONS  acetaminophen   Tablet .. 650 milliGRAM(s) Oral every 4 hours  benztropine 0.5 milliGRAM(s) Oral two times a day  chlorproMAZINE    Tablet 100 milliGRAM(s) Oral at bedtime  chlorproMAZINE    Tablet 25 milliGRAM(s) Oral daily  enoxaparin Injectable 40 milliGRAM(s) SubCutaneous daily  polyethylene glycol 3350 17 Gram(s) Oral daily  tamsulosin 0.4 milliGRAM(s) Oral at bedtime      LABS

## 2021-01-28 NOTE — PROGRESS NOTE ADULT - ASSESSMENT
55M with a sigmoid volvulus s/p GI decompression, s/p ex-lap, sigmoidectomy and creation of end colostomy, now s/p an evacuation of ABD hematoma, recovering well on the floors    Plan:  - C/w dysphagia diet  - continue miralax  - C/w home meds  - Pain control  - F/u AM labs, replete electrolytes as necessary  - DVT ppx with lovenox  - Dispo: lives at group home who is willing to teach staff ostomy care, dc home today  - COVID PCR negative    B team Surgery [id number]

## 2021-01-29 LAB — SURGICAL PATHOLOGY STUDY: SIGNIFICANT CHANGE UP

## 2021-01-29 RX ADMIN — TAMSULOSIN HYDROCHLORIDE 0.4 MILLIGRAM(S): 0.4 CAPSULE ORAL at 21:47

## 2021-01-29 RX ADMIN — Medication 650 MILLIGRAM(S): at 21:46

## 2021-01-29 RX ADMIN — Medication 0.5 MILLIGRAM(S): at 11:28

## 2021-01-29 RX ADMIN — Medication 650 MILLIGRAM(S): at 11:27

## 2021-01-29 RX ADMIN — Medication 650 MILLIGRAM(S): at 17:36

## 2021-01-29 RX ADMIN — ENOXAPARIN SODIUM 40 MILLIGRAM(S): 100 INJECTION SUBCUTANEOUS at 11:28

## 2021-01-29 RX ADMIN — Medication 100 MILLIGRAM(S): at 21:46

## 2021-01-29 RX ADMIN — Medication 650 MILLIGRAM(S): at 05:28

## 2021-01-29 RX ADMIN — Medication 25 MILLIGRAM(S): at 11:28

## 2021-01-29 RX ADMIN — Medication 0.5 MILLIGRAM(S): at 21:48

## 2021-01-29 NOTE — PROGRESS NOTE ADULT - SUBJECTIVE AND OBJECTIVE BOX
SURGERY PROGRESS NOTE    Subjective:   Patient examined at bedside this AM. No acute events overnight.  Denies fever, chills, SOB, chest pain.       Objective:  Vital Signs  T(C): 37 (01-29 @ 05:29), Max: 37.1 (01-28 @ 17:29)  HR: 65 (01-29 @ 05:29) (65 - 100)  BP: 97/62 (01-29 @ 05:29) (91/60 - 102/66)  RR: 18 (01-29 @ 05:29) (16 - 18)  SpO2: 97% (01-29 @ 05:29) (96% - 99%)  01-28-21 @ 07:01  -  01-29-21 @ 07:00  --------------------------------------------------------  IN:  Total IN: 0 mL    OUT:    Incontinent per Collection Bag (mL): 250 mL  Total OUT: 250 mL    Total NET: -250 mL          Physical Exam:  General: alert and oriented, NAD  Resp: airway patent, respirations unlabored  CVS: regular rate and rhythm  Abdomen: soft, nontender, nondistended, no rebound or guarding, ostomy pink and viable, air and stool in bag  Extremities: no edema, WWP    Labs:        CAPILLARY BLOOD GLUCOSE          Medications:   MEDICATIONS  (STANDING):  acetaminophen   Tablet .. 650 milliGRAM(s) Oral every 4 hours  benztropine 0.5 milliGRAM(s) Oral two times a day  chlorproMAZINE    Tablet 100 milliGRAM(s) Oral at bedtime  chlorproMAZINE    Tablet 25 milliGRAM(s) Oral daily  enoxaparin Injectable 40 milliGRAM(s) SubCutaneous daily  polyethylene glycol 3350 17 Gram(s) Oral daily  tamsulosin 0.4 milliGRAM(s) Oral at bedtime    MEDICATIONS  (PRN):

## 2021-01-29 NOTE — PROGRESS NOTE ADULT - ASSESSMENT
Assessment:   55M with a sigmoid volvulus s/p GI decompression, s/p ex-lap, sigmoidectomy and creation of end colostomy, now s/p an evacuation of ABD hematoma, recovering well on the floors    Plan:  - C/w dysphagia diet  - continue miralax  - C/w home meds  - Pain control  - F/u AM labs, replete electrolytes as necessary  - DVT ppx with lovenox  - Dispo: lives at group home who is willing to teach staff ostomy care, dc home today  - COVID PCR negative    Adolfo Bell, PGY-1  B Team Surgery [id number]

## 2021-01-30 RX ADMIN — Medication 0.5 MILLIGRAM(S): at 22:51

## 2021-01-30 RX ADMIN — TAMSULOSIN HYDROCHLORIDE 0.4 MILLIGRAM(S): 0.4 CAPSULE ORAL at 22:51

## 2021-01-30 RX ADMIN — Medication 650 MILLIGRAM(S): at 22:51

## 2021-01-30 RX ADMIN — ENOXAPARIN SODIUM 40 MILLIGRAM(S): 100 INJECTION SUBCUTANEOUS at 11:35

## 2021-01-30 RX ADMIN — Medication 650 MILLIGRAM(S): at 18:47

## 2021-01-30 RX ADMIN — Medication 650 MILLIGRAM(S): at 03:47

## 2021-01-30 RX ADMIN — Medication 650 MILLIGRAM(S): at 07:00

## 2021-01-30 RX ADMIN — Medication 25 MILLIGRAM(S): at 11:35

## 2021-01-30 RX ADMIN — Medication 650 MILLIGRAM(S): at 11:32

## 2021-01-30 RX ADMIN — Medication 100 MILLIGRAM(S): at 22:50

## 2021-01-30 RX ADMIN — Medication 0.5 MILLIGRAM(S): at 11:35

## 2021-01-30 NOTE — PROGRESS NOTE ADULT - ASSESSMENT
55M with a sigmoid volvulus s/p GI decompression, s/p ex-lap, sigmoidectomy and creation of end colostomy, now s/p an evacuation of ABD hematoma, recovering well on the floors. Awaiting placement at inpatient care facility, pending family decision.    Plan:  - C/w dysphagia diet  - C/w miralax  - C/w home meds  - Pain control  - DVT ppx with lovenox  - Dispo: DC home when facility chosen by family  - COVID PCR negative    Thanh Chapman MD, PGY-1  B Team Surgery [id number]

## 2021-01-30 NOTE — PROGRESS NOTE ADULT - SUBJECTIVE AND OBJECTIVE BOX
Interval Events:    S: Patient doing well, denies fevers, chills, nausea, emesis, chest pain, SOB.    O: Vital Signs  T(C): 37 (01-30 @ 07:03), Max: 37.1 (01-29 @ 21:40)  HR: 80 (01-30 @ 07:03) (80 - 95)  BP: 104/69 (01-30 @ 07:03) (93/67 - 109/75)  RR: 17 (01-30 @ 07:03) (16 - 17)  SpO2: 98% (01-30 @ 07:03) (97% - 98%)  01-29-21 @ 07:01  -  01-30-21 @ 07:00  --------------------------------------------------------  IN:  Total IN: 0 mL    OUT:    Incontinent per Collection Bag (mL): 350 mL  Total OUT: 350 mL    Total NET: -350 mL        General: alert and oriented, NAD  Resp: airway patent, respirations unlabored  CVS: regular rate and rhythm  Abdomen: soft, nontender, nondistended  Extremities: no edema  Skin: warm, dry, appropriate color         Interval Events:  - No acute events overnight    S: Patient doing well, tolerating regular diet. Ostomy functioning (+,+)  Unable to assess for fevers, chills, nausea, emesis, chest pain, SOB 2/2 developmental delay    O: Vital Signs  T(C): 37 (01-30 @ 07:03), Max: 37.1 (01-29 @ 21:40)  HR: 80 (01-30 @ 07:03) (80 - 95)  BP: 104/69 (01-30 @ 07:03) (93/67 - 109/75)  RR: 17 (01-30 @ 07:03) (16 - 17)  SpO2: 98% (01-30 @ 07:03) (97% - 98%)  01-29-21 @ 07:01  -  01-30-21 @ 07:00  --------------------------------------------------------  IN:  Total IN: 0 mL    OUT:    Incontinent per Collection Bag (mL): 350 mL  Total OUT: 350 mL    Total NET: -350 mL        General: developmental delay, NAD  Resp: airway patent, respirations unlabored  CVS: appears well perfused  Abdomen: soft, nontender, nondistended; ostomy bag disconnected from patient, patient soiled with stool  Extremities: no edema  Skin: warm, dry, appropriate color

## 2021-01-31 RX ADMIN — Medication 650 MILLIGRAM(S): at 17:25

## 2021-01-31 RX ADMIN — Medication 25 MILLIGRAM(S): at 13:05

## 2021-01-31 RX ADMIN — POLYETHYLENE GLYCOL 3350 17 GRAM(S): 17 POWDER, FOR SOLUTION ORAL at 13:05

## 2021-01-31 RX ADMIN — Medication 0.5 MILLIGRAM(S): at 22:41

## 2021-01-31 RX ADMIN — Medication 650 MILLIGRAM(S): at 13:06

## 2021-01-31 RX ADMIN — Medication 650 MILLIGRAM(S): at 05:10

## 2021-01-31 RX ADMIN — Medication 0.5 MILLIGRAM(S): at 09:19

## 2021-01-31 RX ADMIN — TAMSULOSIN HYDROCHLORIDE 0.4 MILLIGRAM(S): 0.4 CAPSULE ORAL at 22:41

## 2021-01-31 RX ADMIN — Medication 100 MILLIGRAM(S): at 22:41

## 2021-01-31 RX ADMIN — Medication 650 MILLIGRAM(S): at 22:41

## 2021-01-31 RX ADMIN — Medication 650 MILLIGRAM(S): at 09:19

## 2021-01-31 RX ADMIN — ENOXAPARIN SODIUM 40 MILLIGRAM(S): 100 INJECTION SUBCUTANEOUS at 13:06

## 2021-01-31 NOTE — PROGRESS NOTE ADULT - ASSESSMENT
Assessment:   55M with a sigmoid volvulus s/p GI decompression, s/p ex-lap, sigmoidectomy and creation of end colostomy, now s/p an evacuation of ABD hematoma, recovering well on the floors. Awaiting placement at inpatient care facility, pending family decision.    Plan:  - C/w dysphagia diet  - C/w miralax  - C/w home meds  - Pain control  - DVT ppx with lovenox  - Dispo: DC home when facility chosen by family  - COVID PCR negative    Adolfo Bell, PGY-1  B Team Surgery [id number]

## 2021-01-31 NOTE — PROGRESS NOTE ADULT - SUBJECTIVE AND OBJECTIVE BOX
SURGERY PROGRESS NOTE    Subjective:   Patient doing well, tolerating regular diet. Ostomy functioning (+,+)  Unable to assess for fevers, chills, nausea, emesis, chest pain, SOB 2/2 developmental delay      Objective:  Vital Signs  T(C): 36.7 (01-31 @ 05:10), Max: 36.8 (01-30 @ 11:30)  HR: 70 (01-31 @ 05:10) (62 - 81)  BP: 103/61 (01-31 @ 05:10) (95/60 - 103/61)  RR: 17 (01-31 @ 05:10) (17 - 18)  SpO2: 100% (01-31 @ 05:10) (100% - 100%)  01-30-21 @ 07:01  -  01-31-21 @ 07:00  --------------------------------------------------------  IN:  Total IN: 0 mL    OUT:    Incontinent per Collection Bag (mL): 300 mL  Total OUT: 300 mL    Total NET: -300 mL          Physical Exam:  General: developmental delay, NAD  Resp: airway patent, respirations unlabored  CVS: appears well perfused  Abdomen: soft, nontender, nondistended; ostomy bag disconnected from patient, patient soiled with stool  Extremities: no edema  Skin: warm, dry, appropriate color    Labs:        CAPILLARY BLOOD GLUCOSE          Medications:   MEDICATIONS  (STANDING):  acetaminophen   Tablet .. 650 milliGRAM(s) Oral every 4 hours  benztropine 0.5 milliGRAM(s) Oral two times a day  chlorproMAZINE    Tablet 25 milliGRAM(s) Oral daily  chlorproMAZINE    Tablet 100 milliGRAM(s) Oral at bedtime  enoxaparin Injectable 40 milliGRAM(s) SubCutaneous daily  polyethylene glycol 3350 17 Gram(s) Oral daily  tamsulosin 0.4 milliGRAM(s) Oral at bedtime    MEDICATIONS  (PRN):

## 2021-02-01 RX ADMIN — Medication 650 MILLIGRAM(S): at 02:01

## 2021-02-01 RX ADMIN — Medication 650 MILLIGRAM(S): at 05:31

## 2021-02-01 RX ADMIN — Medication 650 MILLIGRAM(S): at 17:59

## 2021-02-01 RX ADMIN — ENOXAPARIN SODIUM 40 MILLIGRAM(S): 100 INJECTION SUBCUTANEOUS at 12:02

## 2021-02-01 RX ADMIN — Medication 0.5 MILLIGRAM(S): at 11:03

## 2021-02-01 RX ADMIN — Medication 650 MILLIGRAM(S): at 23:38

## 2021-02-01 RX ADMIN — Medication 650 MILLIGRAM(S): at 11:02

## 2021-02-01 RX ADMIN — Medication 25 MILLIGRAM(S): at 12:03

## 2021-02-01 RX ADMIN — POLYETHYLENE GLYCOL 3350 17 GRAM(S): 17 POWDER, FOR SOLUTION ORAL at 12:03

## 2021-02-01 RX ADMIN — TAMSULOSIN HYDROCHLORIDE 0.4 MILLIGRAM(S): 0.4 CAPSULE ORAL at 23:38

## 2021-02-01 RX ADMIN — Medication 100 MILLIGRAM(S): at 23:38

## 2021-02-01 RX ADMIN — Medication 0.5 MILLIGRAM(S): at 23:38

## 2021-02-01 NOTE — PROGRESS NOTE ADULT - ASSESSMENT
Assessment:   55M with a sigmoid volvulus s/p GI decompression, s/p ex-lap, sigmoidectomy and creation of end colostomy, now s/p an evacuation of ABD hematoma, recovering well on the floors. Awaiting placement at inpatient care facility, pending family decision.    Plan:  - Follow-up COVID results  - C/w dysphagia diet  - C/w miralax  - C/w home meds  - Pain control  - DVT ppx with lovenox  - Dispo: DC to home facility    B Team Surgery [id number]

## 2021-02-01 NOTE — PROGRESS NOTE ADULT - SUBJECTIVE AND OBJECTIVE BOX
B TEAM SURGERY PROGRESS NOTE    SUBJECTIVE: Patient doing well, tolerating regular diet. Ostomy functioning (+,+)      VITALS/I&Os  T(C): 36.7 (01-31 @ 05:10), Max: 36.8 (01-30 @ 11:30)  HR: 70 (01-31 @ 05:10) (62 - 81)  BP: 103/61 (01-31 @ 05:10) (95/60 - 103/61)  RR: 17 (01-31 @ 05:10) (17 - 18)  SpO2: 100% (01-31 @ 05:10) (100% - 100%)  01-30-21 @ 07:01  -  01-31-21 @ 07:00  --------------------------------------------------------  IN:  Total IN: 0 mL    OUT:    Incontinent per Collection Bag (mL): 300 mL  Total OUT: 300 mL    Total NET: -300 mL      EXAM:  General: developmental delay, NAD  Resp: airway patent, respirations unlabored  CVS: appears well perfused  Abdomen: soft, nontender, nondistended; ostomy: +/+  Extremities: no edema  Skin: warm, dry, appropriate color      MEDICATIONS  MEDICATIONS  (STANDING):  acetaminophen   Tablet .. 650 milliGRAM(s) Oral every 4 hours  benztropine 0.5 milliGRAM(s) Oral two times a day  chlorproMAZINE    Tablet 100 milliGRAM(s) Oral at bedtime  chlorproMAZINE    Tablet 25 milliGRAM(s) Oral daily  enoxaparin Injectable 40 milliGRAM(s) SubCutaneous daily  polyethylene glycol 3350 17 Gram(s) Oral daily  tamsulosin 0.4 milliGRAM(s) Oral at bedtime    MEDICATIONS  (PRN):      LABS

## 2021-02-02 RX ADMIN — POLYETHYLENE GLYCOL 3350 17 GRAM(S): 17 POWDER, FOR SOLUTION ORAL at 13:17

## 2021-02-02 RX ADMIN — Medication 25 MILLIGRAM(S): at 13:17

## 2021-02-02 RX ADMIN — Medication 650 MILLIGRAM(S): at 09:41

## 2021-02-02 RX ADMIN — Medication 650 MILLIGRAM(S): at 07:28

## 2021-02-02 RX ADMIN — Medication 650 MILLIGRAM(S): at 21:00

## 2021-02-02 RX ADMIN — Medication 100 MILLIGRAM(S): at 21:01

## 2021-02-02 RX ADMIN — ENOXAPARIN SODIUM 40 MILLIGRAM(S): 100 INJECTION SUBCUTANEOUS at 13:17

## 2021-02-02 RX ADMIN — Medication 650 MILLIGRAM(S): at 13:17

## 2021-02-02 RX ADMIN — TAMSULOSIN HYDROCHLORIDE 0.4 MILLIGRAM(S): 0.4 CAPSULE ORAL at 21:00

## 2021-02-02 RX ADMIN — Medication 0.5 MILLIGRAM(S): at 21:00

## 2021-02-02 RX ADMIN — Medication 650 MILLIGRAM(S): at 18:00

## 2021-02-02 RX ADMIN — Medication 0.5 MILLIGRAM(S): at 09:41

## 2021-02-02 NOTE — PROGRESS NOTE ADULT - NUTRITIONAL ASSESSMENT
This patient has been assessed with a concern for Malnutrition and has been determined to have a diagnosis/diagnoses of Moderate protein-calorie malnutrition.    This patient is being managed with:   Diet Dysphagia 1 Pureed-Honey Consistency Fluid-  Fiber/Residue Restricted (LOWFIBER)  Supplement Feeding Modality:  Oral  Ensure Pudding Cans or Servings Per Day:  1       Frequency:  Three Times a day  Entered: Jan 23 2021  7:02PM    Diet Dysphagia 1 Pureed-Honey Consistency Fluid-  Fiber/Residue Restricted (LOWFIBER)  Supplement Feeding Modality:  Oral  Ensure Pudding Cans or Servings Per Day:  1       Frequency:  Three Times a day  Entered: Jan 22 2021  4:18PM    The following pending diet order is being considered for treatment of Moderate protein-calorie malnutrition:null
This patient has been assessed with a concern for Malnutrition and has been determined to have a diagnosis/diagnoses of Moderate protein-calorie malnutrition.    This patient is being managed with:   Diet NPO-  Except Medications  Entered: Jan 22 2021  9:12PM    The following pending diet order is being considered for treatment of Moderate protein-calorie malnutrition:  Diet Dysphagia 1 Pureed-Honey Consistency Fluid-  Fiber/Residue Restricted (LOWFIBER)  Supplement Feeding Modality:  Oral  Ensure Pudding Cans or Servings Per Day:  1       Frequency:  Three Times a day  Entered: Jan 22 2021  4:18PM

## 2021-02-02 NOTE — PROGRESS NOTE ADULT - SUBJECTIVE AND OBJECTIVE BOX
B TEAM SURGERY PROGRESS NOTE    SUBJECTIVE: Patient doing well, tolerating regular diet. Ostomy functioning (+,+)      VITALS/I&Os  T(C): 36.7 (01-31 @ 05:10), Max: 36.8 (01-30 @ 11:30)  HR: 70 (01-31 @ 05:10) (62 - 81)  BP: 103/61 (01-31 @ 05:10) (95/60 - 103/61)  RR: 17 (01-31 @ 05:10) (17 - 18)  SpO2: 100% (01-31 @ 05:10) (100% - 100%)  01-30-21 @ 07:01  -  01-31-21 @ 07:00  --------------------------------------------------------  IN:  Total IN: 0 mL    OUT:    Incontinent per Collection Bag (mL): 300 mL  Total OUT: 300 mL    Total NET: -300 mL      EXAM:  General: developmental delay, NAD  Resp: airway patent, respirations unlabored  CVS: appears well perfused  Abdomen: soft, nontender, nondistended; ostomy: +/+  Extremities: no edema  Skin: warm, dry, appropriate color      MEDICATIONS  MEDICATIONS  (STANDING):  acetaminophen   Tablet .. 650 milliGRAM(s) Oral every 4 hours  benztropine 0.5 milliGRAM(s) Oral two times a day  chlorproMAZINE    Tablet 100 milliGRAM(s) Oral at bedtime  chlorproMAZINE    Tablet 25 milliGRAM(s) Oral daily  enoxaparin Injectable 40 milliGRAM(s) SubCutaneous daily  polyethylene glycol 3350 17 Gram(s) Oral daily  tamsulosin 0.4 milliGRAM(s) Oral at bedtime    MEDICATIONS  (PRN):      LABS         B TEAM SURGERY PROGRESS NOTE    SUBJECTIVE: Patient doing well, tolerating regular diet. Ostomy functioning (+,+).      VITALS/I&Os  ICU Vital Signs Last 24 Hrs  T(C): 36.9 (02 Feb 2021 06:23), Max: 36.9 (01 Feb 2021 20:45)  T(F): 98.5 (02 Feb 2021 06:23), Max: 98.5 (02 Feb 2021 06:23)  HR: 74 (02 Feb 2021 06:23) (74 - 89)  BP: 89/55 (02 Feb 2021 06:23) (89/55 - 96/55)  RR: 16 (02 Feb 2021 06:23) (16 - 17)  SpO2: 99% (02 Feb 2021 06:23) (95% - 100%)    I&O's Detail    01 Feb 2021 07:01  -  02 Feb 2021 07:00  --------------------------------------------------------  IN:  Total IN: 0 mL    OUT:    Colostomy (mL): 50 mL    Incontinent per Collection Bag (mL): 100 mL  Total OUT: 150 mL    Total NET: -150 mL      EXAM:  General: developmental delay, NAD  Resp: airway patent, respirations unlabored  CVS: appears well perfused  Abdomen: soft, nontender, nondistended; ostomy: +/+  Extremities: no edema  Skin: warm, dry, appropriate color      LABS  No labs.

## 2021-02-02 NOTE — PROGRESS NOTE ADULT - ASSESSMENT
Assessment:   55M with a sigmoid volvulus s/p GI decompression, s/p ex-lap, sigmoidectomy and creation of end colostomy, now s/p an evacuation of ABD hematoma, recovering well on the floors. Awaiting placement at inpatient care facility, pending family decision.    Plan:  - Follow-up COVID results  - C/w dysphagia diet  - C/w miralax  - C/w home meds  - Pain control  - DVT ppx with lovenox  - Dispo: DC to home facility    B Team Surgery [id number] Assessment:   55M with a sigmoid volvulus s/p GI decompression, s/p ex-lap, sigmoidectomy and creation of end colostomy, now s/p an evacuation of ABD hematoma, recovering well on the floors. Awaiting placement at inpatient care facility, pending family decision.    Plan:  - COVID negative on Jan 26  - C/w dysphagia diet  - C/w miralax  - C/w home meds  - Pain control PRN   - DVT ppx with lovenox  - Dispo: DC to home facility pending family approval     B Team Surgery [id number]

## 2021-02-03 ENCOUNTER — TRANSCRIPTION ENCOUNTER (OUTPATIENT)
Age: 56
End: 2021-02-03

## 2021-02-03 RX ADMIN — Medication 0.5 MILLIGRAM(S): at 10:04

## 2021-02-03 RX ADMIN — POLYETHYLENE GLYCOL 3350 17 GRAM(S): 17 POWDER, FOR SOLUTION ORAL at 11:49

## 2021-02-03 RX ADMIN — ENOXAPARIN SODIUM 40 MILLIGRAM(S): 100 INJECTION SUBCUTANEOUS at 11:49

## 2021-02-03 RX ADMIN — Medication 650 MILLIGRAM(S): at 14:48

## 2021-02-03 RX ADMIN — Medication 650 MILLIGRAM(S): at 10:04

## 2021-02-03 RX ADMIN — Medication 650 MILLIGRAM(S): at 05:30

## 2021-02-03 RX ADMIN — Medication 650 MILLIGRAM(S): at 22:41

## 2021-02-03 RX ADMIN — Medication 100 MILLIGRAM(S): at 22:42

## 2021-02-03 RX ADMIN — Medication 650 MILLIGRAM(S): at 18:26

## 2021-02-03 RX ADMIN — Medication 0.5 MILLIGRAM(S): at 22:42

## 2021-02-03 RX ADMIN — Medication 25 MILLIGRAM(S): at 11:49

## 2021-02-03 NOTE — PROGRESS NOTE ADULT - SUBJECTIVE AND OBJECTIVE BOX
Surgery Daily Progress Note  =====================================================  Interval / Overnight Events: No acute events overnight.      HPI:  Patient is a 55 year old male with a PMHx of MR (non verbal at baseline) who presented with abdominal pain. (14 Jan 2021 12:17)      PAST MEDICAL & SURGICAL HISTORY:  Seizure disorder  Autism disorder  No significant past surgical history      ALLERGIES:  No Known Allergies    --------------------------------------------------------------------------------------    MEDICATIONS:    Neurologic Medications  acetaminophen   Tablet .. 650 milliGRAM(s) Oral every 4 hours  benztropine 0.5 milliGRAM(s) Oral two times a day  chlorproMAZINE    Tablet 100 milliGRAM(s) Oral at bedtime  chlorproMAZINE    Tablet 25 milliGRAM(s) Oral daily    Cardiovascular Medications  tamsulosin 0.4 milliGRAM(s) Oral at bedtime    Gastrointestinal Medications  polyethylene glycol 3350 17 Gram(s) Oral daily    Hematologic/Oncologic Medications  enoxaparin Injectable 40 milliGRAM(s) SubCutaneous daily    --------------------------------------------------------------------------------------    VITAL SIGNS:  T(C): 36.3 (03 Feb 2021 09:02), Max: 36.7 (02 Feb 2021 11:31)  T(F): 97.3 (03 Feb 2021 09:02), Max: 98 (02 Feb 2021 11:31)  HR: 75 (03 Feb 2021 09:02) (68 - 83)  BP: 100/77 (03 Feb 2021 09:02) (88/58 - 100/77)  RR: 18 (03 Feb 2021 09:02) (16 - 18)  SpO2: 100% (03 Feb 2021 09:02) (98% - 100%)    --------------------------------------------------------------------------------------    INS AND OUTS:    02 Feb 2021 07:01  -  03 Feb 2021 07:00  --------------------------------------------------------  IN:  Total IN: 0 mL    OUT:    Colostomy (mL): 50 mL  Total OUT: 50 mL    Total NET: -50 mL    --------------------------------------------------------------------------------------    EXAM    NEUROLOGY   Exam: Normal, in no acute distress.    HEENT  Exam: Normocephalic, atraumatic.    RESPIRATORY  Exam: Normal expansion / effort.     CARDIOVASCULAR  Exam: S1, S2.  Regular rate and rhythm.    GI/NUTRITION  Exam: Abdomen soft, Non-tender, Non-distended.  Ostomy with gas and stool in bag.  Current Diet: Dysphagia diet    MUSCULOSKELETAL  Exam: All extremities moving spontaneously without limitations.      HEMATOLOGIC  [x] VTE Prophylaxis: enoxaparin Injectable 40 milliGRAM(s) SubCutaneous daily    --------------------------------------------------------------------------------------

## 2021-02-03 NOTE — PROGRESS NOTE ADULT - ASSESSMENT
Patient is a 55 year old male with a PMHx of MR (non verbal at baseline) who presented with abdominal pain.  He was found to have a sigmoid volvulus.  Patient is now S/P GI decompression, ex-lap, sigmoidectomy and creation of end colostomy.  Also S/P an evacuation of abdominal hematoma, recovering well on the floors.  Awaiting placement at inpatient care facility, pending family decision.      PLAN:  - Dysphagia diet  - Monitor ostomy output  - Pain control  - VTE prophylaxis with Lovenox subcutaneous  - Discharge planning back to [facility]      #[id number]  B Team Surgery

## 2021-02-03 NOTE — DISCHARGE NOTE NURSING/CASE MANAGEMENT/SOCIAL WORK - NSDPDISTO_GEN_ALL_CORE
Pt. is afebrile and offers no complaints. In no acute distress. Left lower quadrant colostomy- stoma pink, draining formed stool  Incontinent of urine. Pt ambulates with rolling walker and assistance. Tolerating diet well./Skilled Nursing Facility

## 2021-02-03 NOTE — DISCHARGE NOTE NURSING/CASE MANAGEMENT/SOCIAL WORK - NSDCPNINST_GEN_ALL_CORE
Make a follow up appointment with Dr. Boswell. Call MD if you develop a fever, or if there is redness, swelling, drainage or pain not relieved by pain medication. No heavy lifting, bending, or straining to move your bowels. Colostomy care as instructed. Encourage ambulation

## 2021-02-03 NOTE — DISCHARGE NOTE NURSING/CASE MANAGEMENT/SOCIAL WORK - PATIENT PORTAL LINK FT
You can access the FollowMyHealth Patient Portal offered by [facility] by registering at the following website: [email or web address]. By joining [facility]’s FollowMyHealth portal, you will also be able to view your health information using other applications (apps) compatible with our system.

## 2021-02-04 VITALS
TEMPERATURE: 98 F | RESPIRATION RATE: 16 BRPM | HEART RATE: 89 BPM | DIASTOLIC BLOOD PRESSURE: 60 MMHG | SYSTOLIC BLOOD PRESSURE: 102 MMHG | OXYGEN SATURATION: 100 %

## 2021-02-04 RX ADMIN — ENOXAPARIN SODIUM 40 MILLIGRAM(S): 100 INJECTION SUBCUTANEOUS at 12:17

## 2021-02-04 RX ADMIN — POLYETHYLENE GLYCOL 3350 17 GRAM(S): 17 POWDER, FOR SOLUTION ORAL at 12:17

## 2021-02-04 RX ADMIN — Medication 650 MILLIGRAM(S): at 09:43

## 2021-02-04 RX ADMIN — Medication 25 MILLIGRAM(S): at 12:17

## 2021-02-04 RX ADMIN — Medication 650 MILLIGRAM(S): at 06:52

## 2021-02-04 RX ADMIN — Medication 0.5 MILLIGRAM(S): at 09:43

## 2021-02-04 NOTE — PROVIDER CONTACT NOTE (OTHER) - ACTION/TREATMENT ORDERED:
B team notified. No further interventions ordered at this time. Will continue to monitor.
Patient is not eating no output in Colostomy will continue to monitor
MD assessed pt in person; as per MD, will discuss condition with the surgical team. Continue to monitor.
Ok to monitor as patient's blood pressure low at times  will continue to assess
Patient assessed by the B team surgical team. Report given to the OR

## 2021-02-04 NOTE — PROVIDER CONTACT NOTE (OTHER) - BACKGROUND
Adm: Intestinal volvulus
Autism Disorder Sigmoid Volvulus
s/p surgery w/ new colostomy.
Patient admitted for ex lap, sigmoid colectomy, and end colostomy. Pt s/p 1/23 hematoma evacuation.
Patient s/p exp lap, sigmoid colectomy and end colostomy

## 2021-02-04 NOTE — PROVIDER CONTACT NOTE (OTHER) - ASSESSMENT
Patient shows no signs/symptoms of pain or discomfort. Patient passing flatus through colostomy. Abdomen flat and non-distended.
VS stable; no pain noted. localized abdominal bulge/swelling noted at the right side of the surgical site. small amount of sero-sang fluid leaking from the surgical site. this was not present earlier today.
No discomfort noted, no signs of bleeding or distress  Heart rate 62
Patient alert however nonverbal. Midline incision with staples nancy. Large bulging area at the surgical site draining small amount of serosanguineous drainage.

## 2021-02-04 NOTE — PROVIDER CONTACT NOTE (OTHER) - REASON
Increasing bulging area at the surgical site
Patient's Colonostomy has no output
blood pressure 95/60
localized abdominal bulge/swelling noted at the right side of the surgical site.
No stool output of colostomy throughout shift

## 2021-02-04 NOTE — PROGRESS NOTE ADULT - ATTENDING COMMENTS
Patient with sigmoid volvulus s/p detorsion and rectal tube.  plan  same admission colectomy and ostomy  bowel prep  medical optimization  npo, ivf    I have personally interviewed and examined this patient, reviewed pertinent labs and imaging, and discussed the case with colleagues, residents, and physician assistants on B Team rounds.    The active care issues are:  1. sigmoid volvulus    The Acute Care Surgery (B Team) Attending Group Practice:  Dr. Corinna Carson, Dr. Riley Escobar, Dr. Mendoza Rose, Dr. Solo Chow,     urgent issues - spectra [phone]  nonurgent issues - [phone]  patient appointments or afterhours - [phone]
I saw and examined the patient. I was physically present for the key portions of the evaluation and management (E/M) service provided.  I agree with the above history, physical, and plan which I have reviewed and edited where appropriate.      Mendoaz Rose MD  Acute and Critical Care Surgery    The Acute Care Surgery (B Team) Attending Group Practice:  Dr. Corinna Carson, Dr. Riley Escobar, Dr. Mendoza Rose, and Dr. Solo Chow    Urgent issues - spectra [phone] or [phone]  Nonurgent issues - [phone]  Patient appointments or after hours - [phone]
I saw and examined the patient. I was physically present for the key portions of the evaluation and management (E/M) service provided.  I agree with the above history, physical, and plan which I have reviewed and edited where appropriate.    Mendoza Rose MD  Acute and Critical Care Surgery    The Acute Care Surgery (B Team) Attending Group Practice:  Dr. Corinna Carson, Dr. Riley Escobar, Dr. Mendoza Rose, and Dr. Solo Chow    Urgent issues - spectra [phone] or [phone]  Nonurgent issues - [phone]  Patient appointments or after hours - [phone]
Pt s/e with GI fellow. Agree with above.    sigmoid volvulus s/p flex sig decompression and colonic decompression tube placement
Pt seen and examined.  Agree with resident eval and plan.
I saw and examined the patient. I was physically present for the key portions of the evaluation and management (E/M) service provided.  I agree with the above history, physical, and plan which I have reviewed and edited where appropriate.    Mendoza Rose MD  Acute and Critical Care Surgery    The Acute Care Surgery (B Team) Attending Group Practice:  Dr. Corinna Carson, Dr. Riley Escobar, Dr. Mendoza Rose, and Dr. Solo Chow    Urgent issues - spectra [phone] or [phone]  Nonurgent issues - [phone]  Patient appointments or after hours - [phone]

## 2021-02-04 NOTE — PROGRESS NOTE ADULT - ASSESSMENT
Patient is a 55 year old male with a PMHx of MR (non verbal at baseline) who presented with abdominal pain.  He was found to have a sigmoid volvulus.  Patient is now S/P GI decompression, ex-lap, sigmoidectomy and creation of end colostomy.  Also S/P an evacuation of abdominal hematoma, recovering well on the floors.  Awaiting placement at inpatient care facility, pending family decision.      PLAN:  - Dysphagia diet  - Monitor ostomy output  - Pain control  - VTE prophylaxis with Lovenox subcutaneous  - Discharge planning back to [facility]      #[id number]  B Team Surgery

## 2021-02-04 NOTE — PROGRESS NOTE ADULT - PROVIDER SPECIALTY LIST ADULT
Surgery
Gastroenterology
Surgery

## 2021-02-04 NOTE — PROVIDER CONTACT NOTE (OTHER) - SITUATION
Patient did not have any stool output overnight through colostomy. Patient passed flatus through colostomy.
Intestional Volvulus
MD Sheldon notified that the bulging area at patient midline incision continues to increase in size
blood pressure 95/60 HR 62
localized abdominal bulge/swelling noted at the right side of the surgical site. small amount of sero-sang fluid leaking from the surgical site. this was not present earlier today.

## 2021-02-04 NOTE — PROVIDER CONTACT NOTE (OTHER) - RECOMMENDATIONS
Patient to return to the OR for emergency surgery.
B team notified.
Requested MD to assess pt in person & recommended Abdominal X-ray.

## 2021-02-04 NOTE — PROGRESS NOTE ADULT - SUBJECTIVE AND OBJECTIVE BOX
Surgery Daily Progress Note  =====================================================  Interval / Overnight Events: No acute events overnight.      HPI:  Patient is a 55 year old male with a PMHx of MR (non verbal at baseline) who presented with abdominal pain. (14 Jan 2021 12:17)      PAST MEDICAL & SURGICAL HISTORY:  Seizure disorder  Autism disorder  No significant past surgical history      ALLERGIES:  No Known Allergies    --------------------------------------------------------------------------------------    MEDICATIONS:    Neurologic Medications  acetaminophen   Tablet .. 650 milliGRAM(s) Oral every 4 hours  benztropine 0.5 milliGRAM(s) Oral two times a day  chlorproMAZINE    Tablet 25 milliGRAM(s) Oral daily  chlorproMAZINE    Tablet 100 milliGRAM(s) Oral at bedtime    Cardiovascular Medications  tamsulosin 0.4 milliGRAM(s) Oral at bedtime    Gastrointestinal Medications  polyethylene glycol 3350 17 Gram(s) Oral daily    Hematologic/Oncologic Medications  enoxaparin Injectable 40 milliGRAM(s) SubCutaneous daily    --------------------------------------------------------------------------------------    VITAL SIGNS:  T(C): 36.5 (04 Feb 2021 06:51), Max: 36.9 (03 Feb 2021 17:37)  T(F): 97.7 (04 Feb 2021 06:51), Max: 98.5 (03 Feb 2021 17:37)  HR: 70 (04 Feb 2021 06:51) (70 - 86)  BP: 91/67 (04 Feb 2021 06:51) (91/67 - 100/77)  RR: 16 (04 Feb 2021 06:51) (16 - 18)  SpO2: 100% (04 Feb 2021 06:51) (99% - 100%)    --------------------------------------------------------------------------------------    INS AND OUTS:    03 Feb 2021 07:01  -  04 Feb 2021 07:00  --------------------------------------------------------  IN:  Total IN: 0 mL    OUT:    Colostomy (mL): 50 mL  Total OUT: 50 mL    Total NET: -50 mL    --------------------------------------------------------------------------------------    EXAM    NEUROLOGY   Exam: Normal, in no acute distress.    HEENT  Exam: Normocephalic, atraumatic.    RESPIRATORY  Exam: Normal expansion / effort.     CARDIOVASCULAR  Exam: S1, S2.  Regular rate and rhythm.    GI/NUTRITION  Exam: Abdomen soft, Non-tender, Non-distended.  Ostomy with gas and stool in bag.  Current Diet: Dysphagia diet    MUSCULOSKELETAL  Exam: All extremities moving spontaneously without limitations.      HEMATOLOGIC  [x] VTE Prophylaxis: enoxaparin Injectable 40 milliGRAM(s) SubCutaneous daily    --------------------------------------------------------------------------------------

## 2021-02-04 NOTE — PROGRESS NOTE ADULT - REASON FOR ADMISSION
sigmoid volvulus

## 2021-04-30 ENCOUNTER — EMERGENCY (EMERGENCY)
Facility: HOSPITAL | Age: 56
LOS: 1 days | Discharge: HOME | End: 2021-04-30
Attending: EMERGENCY MEDICINE | Admitting: EMERGENCY MEDICINE
Payer: MEDICAID

## 2021-04-30 VITALS
RESPIRATION RATE: 16 BRPM | SYSTOLIC BLOOD PRESSURE: 124 MMHG | TEMPERATURE: 97 F | HEART RATE: 95 BPM | DIASTOLIC BLOOD PRESSURE: 74 MMHG | OXYGEN SATURATION: 100 %

## 2021-04-30 VITALS
SYSTOLIC BLOOD PRESSURE: 119 MMHG | HEART RATE: 101 BPM | TEMPERATURE: 98 F | DIASTOLIC BLOOD PRESSURE: 80 MMHG | OXYGEN SATURATION: 100 % | RESPIRATION RATE: 16 BRPM

## 2021-04-30 LAB
ALBUMIN SERPL ELPH-MCNC: 3.9 G/DL — SIGNIFICANT CHANGE UP (ref 3.3–5)
ALP SERPL-CCNC: 83 U/L — SIGNIFICANT CHANGE UP (ref 40–120)
ALT FLD-CCNC: 10 U/L — SIGNIFICANT CHANGE UP (ref 4–41)
ANION GAP SERPL CALC-SCNC: 11 MMOL/L — SIGNIFICANT CHANGE UP (ref 7–14)
AST SERPL-CCNC: 15 U/L — SIGNIFICANT CHANGE UP (ref 4–40)
B PERT DNA SPEC QL NAA+PROBE: SIGNIFICANT CHANGE UP
BASOPHILS # BLD AUTO: 0.01 K/UL — SIGNIFICANT CHANGE UP (ref 0–0.2)
BASOPHILS NFR BLD AUTO: 0.2 % — SIGNIFICANT CHANGE UP (ref 0–2)
BILIRUB SERPL-MCNC: 0.2 MG/DL — SIGNIFICANT CHANGE UP (ref 0.2–1.2)
BUN SERPL-MCNC: 16 MG/DL — SIGNIFICANT CHANGE UP (ref 7–23)
C PNEUM DNA SPEC QL NAA+PROBE: SIGNIFICANT CHANGE UP
CALCIUM SERPL-MCNC: 8.9 MG/DL — SIGNIFICANT CHANGE UP (ref 8.4–10.5)
CHLORIDE SERPL-SCNC: 101 MMOL/L — SIGNIFICANT CHANGE UP (ref 98–107)
CO2 SERPL-SCNC: 26 MMOL/L — SIGNIFICANT CHANGE UP (ref 22–31)
CREAT SERPL-MCNC: 0.82 MG/DL — SIGNIFICANT CHANGE UP (ref 0.5–1.3)
EOSINOPHIL # BLD AUTO: 0.03 K/UL — SIGNIFICANT CHANGE UP (ref 0–0.5)
EOSINOPHIL NFR BLD AUTO: 0.6 % — SIGNIFICANT CHANGE UP (ref 0–6)
FLUAV SUBTYP SPEC NAA+PROBE: SIGNIFICANT CHANGE UP
FLUBV RNA SPEC QL NAA+PROBE: SIGNIFICANT CHANGE UP
GLUCOSE SERPL-MCNC: 93 MG/DL — SIGNIFICANT CHANGE UP (ref 70–99)
HADV DNA SPEC QL NAA+PROBE: SIGNIFICANT CHANGE UP
HCOV 229E RNA SPEC QL NAA+PROBE: SIGNIFICANT CHANGE UP
HCOV HKU1 RNA SPEC QL NAA+PROBE: SIGNIFICANT CHANGE UP
HCOV NL63 RNA SPEC QL NAA+PROBE: SIGNIFICANT CHANGE UP
HCOV OC43 RNA SPEC QL NAA+PROBE: SIGNIFICANT CHANGE UP
HCT VFR BLD CALC: 41.2 % — SIGNIFICANT CHANGE UP (ref 39–50)
HGB BLD-MCNC: 13.6 G/DL — SIGNIFICANT CHANGE UP (ref 13–17)
HMPV RNA SPEC QL NAA+PROBE: SIGNIFICANT CHANGE UP
HPIV1 RNA SPEC QL NAA+PROBE: SIGNIFICANT CHANGE UP
HPIV2 RNA SPEC QL NAA+PROBE: SIGNIFICANT CHANGE UP
HPIV3 RNA SPEC QL NAA+PROBE: SIGNIFICANT CHANGE UP
HPIV4 RNA SPEC QL NAA+PROBE: SIGNIFICANT CHANGE UP
IANC: 2.76 K/UL — SIGNIFICANT CHANGE UP (ref 1.5–8.5)
IMM GRANULOCYTES NFR BLD AUTO: 0.2 % — SIGNIFICANT CHANGE UP (ref 0–1.5)
LYMPHOCYTES # BLD AUTO: 1.64 K/UL — SIGNIFICANT CHANGE UP (ref 1–3.3)
LYMPHOCYTES # BLD AUTO: 34.5 % — SIGNIFICANT CHANGE UP (ref 13–44)
MCHC RBC-ENTMCNC: 30 PG — SIGNIFICANT CHANGE UP (ref 27–34)
MCHC RBC-ENTMCNC: 33 GM/DL — SIGNIFICANT CHANGE UP (ref 32–36)
MCV RBC AUTO: 90.7 FL — SIGNIFICANT CHANGE UP (ref 80–100)
MONOCYTES # BLD AUTO: 0.3 K/UL — SIGNIFICANT CHANGE UP (ref 0–0.9)
MONOCYTES NFR BLD AUTO: 6.3 % — SIGNIFICANT CHANGE UP (ref 2–14)
NEUTROPHILS # BLD AUTO: 2.76 K/UL — SIGNIFICANT CHANGE UP (ref 1.8–7.4)
NEUTROPHILS NFR BLD AUTO: 58.2 % — SIGNIFICANT CHANGE UP (ref 43–77)
NRBC # BLD: 0 /100 WBCS — SIGNIFICANT CHANGE UP
NRBC # FLD: 0 K/UL — SIGNIFICANT CHANGE UP
PLATELET # BLD AUTO: 160 K/UL — SIGNIFICANT CHANGE UP (ref 150–400)
POTASSIUM SERPL-MCNC: 4.3 MMOL/L — SIGNIFICANT CHANGE UP (ref 3.5–5.3)
POTASSIUM SERPL-SCNC: 4.3 MMOL/L — SIGNIFICANT CHANGE UP (ref 3.5–5.3)
PROT SERPL-MCNC: 7.5 G/DL — SIGNIFICANT CHANGE UP (ref 6–8.3)
RAPID RVP RESULT: SIGNIFICANT CHANGE UP
RBC # BLD: 4.54 M/UL — SIGNIFICANT CHANGE UP (ref 4.2–5.8)
RBC # FLD: 12.4 % — SIGNIFICANT CHANGE UP (ref 10.3–14.5)
RSV RNA SPEC QL NAA+PROBE: SIGNIFICANT CHANGE UP
RV+EV RNA SPEC QL NAA+PROBE: SIGNIFICANT CHANGE UP
SARS-COV-2 RNA SPEC QL NAA+PROBE: SIGNIFICANT CHANGE UP
SODIUM SERPL-SCNC: 138 MMOL/L — SIGNIFICANT CHANGE UP (ref 135–145)
WBC # BLD: 4.75 K/UL — SIGNIFICANT CHANGE UP (ref 3.8–10.5)
WBC # FLD AUTO: 4.75 K/UL — SIGNIFICANT CHANGE UP (ref 3.8–10.5)

## 2021-04-30 PROCEDURE — 71045 X-RAY EXAM CHEST 1 VIEW: CPT | Mod: 26

## 2021-04-30 PROCEDURE — 99284 EMERGENCY DEPT VISIT MOD MDM: CPT

## 2021-04-30 NOTE — ED ADULT TRIAGE NOTE - CHIEF COMPLAINT QUOTE
pt is from group home, brought in by staff stating pt has been having dry cough x 2 weeks. Staff denies fever/chills. States pt is on pureed diet, coughing increases also while eating. Pt nonverbal at baseline. pt is from group home, brought in by staff stating pt has been having unproductive cough x 2 weeks. Staff denies fever/chills. States pt is on pureed diet, coughing increases also while eating. Pt nonverbal at baseline.

## 2021-04-30 NOTE — ED PROVIDER NOTE - PATIENT PORTAL LINK FT
You can access the FollowMyHealth Patient Portal offered by [facility] by registering at the following website: [email or web address]. By joining [employer]’s FollowMyHealth portal, you will also be able to view your health information using other applications (apps) compatible with our system.

## 2021-04-30 NOTE — ED ADULT NURSE NOTE - CHIEF COMPLAINT QUOTE
pt is from group home, brought in by staff stating pt has been having unproductive cough x 2 weeks. Staff denies fever/chills. States pt is on pureed diet, coughing increases also while eating. Pt nonverbal at baseline.

## 2021-04-30 NOTE — ED PROVIDER NOTE - ATTENDING CONTRIBUTION TO CARE
Dr. Dominguez: 56 yo male with mental retardation (baseline nonverbal), s/p sigmoid volvulus with ostomy in place and currently eating/drinking pureed diet, brought to ED by aide due to 2 weeks of cough, sometimes without eating, but more frequent with eating.  No N/V/D, obvious abdominal pain, fever, or other concerns from aide.  Pt otherwise acting at his baseline except for cough.  On exam pt chronically-ill appearing but in NAD, heart RRR, lungs CTAB, abd NTND, extremities without swelling, strength grossly intact in all extremities and skin without rash.  No coughing during my evaluation.

## 2021-04-30 NOTE — ED PROVIDER NOTE - NSFOLLOWUPINSTRUCTIONS_ED_ALL_ED_FT
Follow up with your primary care doctor within 1 week  Return to the ER with any worsening or concerning symptoms, fever, chills, shortness of breath, worsening cough or any other concerns.

## 2021-04-30 NOTE — PROVIDER CONTACT NOTE (OTHER) - ASSESSMENT
Lolly Murrieta (PA)  ) referred this case as pt has been medically cleared to go back to [facility]. Writer met with the pt and pt has a staff at bed side.  Therapy aide -  Ms, Rc Zazueta from [facility] ([facility]) .  As per Therapy aidRc luis Ms informed  mode of transportation is van and they have their services van and is on their way to  the pt. medical team was made aware about this intervention and agreement with the plan.  verbal huddle has been completed and no further SW intervention needed.

## 2021-04-30 NOTE — ED PROVIDER NOTE - OBJECTIVE STATEMENT
55yM w/pmhx MR (non verbal), hx sigmoid volvulus with ostomy group to ED from group home with cough x 2 weeks. Aid at bedside states pt has been coughing the past 2 weeks, non productive. Reports increased cough with eating. She denies fevers, vomiting, diarrhea. Feels pt is acting like his normal self. 55yM w/pmhx MR (non verbal), hx sigmoid volvulus with ostomy group to ED from group home with cough x 2 weeks. Aid at bedside states pt has been coughing the past 2 weeks, non productive. Reports increased cough with eating. She denies fevers, vomiting, diarrhea. Feels pt is acting like his normal self.  Aid Cardette at bedside

## 2021-04-30 NOTE — ED PROVIDER NOTE - CLINICAL SUMMARY MEDICAL DECISION MAKING FREE TEXT BOX
55yM w/pmhx MR (non verbal), hx sigmoid volvulus with ostomy group to ED from group home with cough x 2 weeks. Pt is non verbal at baseline, acting like his usual self per aid at bedside. vitals within normal, lungs clear. Unlikely infectious pt is afebrile, well appearing. Plan: CXR, basic labs, RVP

## 2021-05-12 ENCOUNTER — APPOINTMENT (OUTPATIENT)
Dept: UROLOGY | Facility: CLINIC | Age: 56
End: 2021-05-12
Payer: MEDICAID

## 2021-05-12 VITALS
RESPIRATION RATE: 18 BRPM | OXYGEN SATURATION: 99 % | DIASTOLIC BLOOD PRESSURE: 71 MMHG | TEMPERATURE: 97.3 F | HEART RATE: 107 BPM | SYSTOLIC BLOOD PRESSURE: 104 MMHG

## 2021-05-12 PROCEDURE — 51798 US URINE CAPACITY MEASURE: CPT

## 2021-05-12 PROCEDURE — 99205 OFFICE O/P NEW HI 60 MIN: CPT

## 2021-05-12 NOTE — REVIEW OF SYSTEMS
[Cough] : cough [Difficulty Walking] : difficulty walking [Anxiety] : anxiety [Muscle Weakness] : muscle weakness [Negative] : Heme/Lymph [FreeTextEntry4] : KIDNEY STONES

## 2021-05-17 LAB
APPEARANCE: CLEAR
BACTERIA UR CULT: NORMAL
BACTERIA: NEGATIVE
BILIRUBIN URINE: NEGATIVE
BLOOD URINE: NEGATIVE
COLOR: YELLOW
GLUCOSE QUALITATIVE U: NEGATIVE
HYALINE CASTS: 0 /LPF
KETONES URINE: NEGATIVE
LEUKOCYTE ESTERASE URINE: NEGATIVE
MICROSCOPIC-UA: NORMAL
NITRITE URINE: NEGATIVE
PH URINE: 6.5
PROTEIN URINE: NORMAL
RED BLOOD CELLS URINE: 1 /HPF
SPECIFIC GRAVITY URINE: 1.02
SQUAMOUS EPITHELIAL CELLS: 0 /HPF
UROBILINOGEN URINE: NORMAL
WHITE BLOOD CELLS URINE: 0 /HPF

## 2021-05-27 RX ORDER — MIRABEGRON 25 MG/1
25 TABLET, FILM COATED, EXTENDED RELEASE ORAL
Qty: 90 | Refills: 3 | Status: ACTIVE | COMMUNITY
Start: 2021-05-27 | End: 1900-01-01

## 2021-05-27 RX ORDER — TAMSULOSIN HYDROCHLORIDE 0.4 MG/1
0.4 CAPSULE ORAL
Qty: 90 | Refills: 3 | Status: ACTIVE | COMMUNITY
Start: 2021-05-27 | End: 1900-01-01

## 2021-06-02 RX ORDER — MIRABEGRON 50 MG/1
50 TABLET, FILM COATED, EXTENDED RELEASE ORAL
Qty: 90 | Refills: 3 | Status: ACTIVE | COMMUNITY
Start: 2021-06-02 | End: 1900-01-01

## 2021-06-03 ENCOUNTER — NON-APPOINTMENT (OUTPATIENT)
Age: 56
End: 2021-06-03

## 2021-06-03 RX ORDER — MIRABEGRON 50 MG/1
50 TABLET, FILM COATED, EXTENDED RELEASE ORAL
Qty: 30 | Refills: 5 | Status: ACTIVE | COMMUNITY
Start: 2021-06-01 | End: 1900-01-01

## 2021-06-04 ENCOUNTER — NON-APPOINTMENT (OUTPATIENT)
Age: 56
End: 2021-06-04

## 2021-08-05 ENCOUNTER — EMERGENCY (EMERGENCY)
Facility: HOSPITAL | Age: 56
LOS: 0 days | Discharge: NURSING, REHAB OR LONG-TERM CARE | End: 2021-08-05
Attending: EMERGENCY MEDICINE
Payer: MEDICAID

## 2021-08-05 VITALS
OXYGEN SATURATION: 96 % | RESPIRATION RATE: 18 BRPM | TEMPERATURE: 98 F | DIASTOLIC BLOOD PRESSURE: 65 MMHG | HEIGHT: 70 IN | HEART RATE: 107 BPM | WEIGHT: 160.06 LBS | SYSTOLIC BLOOD PRESSURE: 103 MMHG

## 2021-08-05 DIAGNOSIS — Z20.822 CONTACT WITH AND (SUSPECTED) EXPOSURE TO COVID-19: ICD-10-CM

## 2021-08-05 DIAGNOSIS — F84.0 AUTISTIC DISORDER: ICD-10-CM

## 2021-08-05 DIAGNOSIS — G40.909 EPILEPSY, UNSPECIFIED, NOT INTRACTABLE, WITHOUT STATUS EPILEPTICUS: ICD-10-CM

## 2021-08-05 DIAGNOSIS — R62.50 UNSPECIFIED LACK OF EXPECTED NORMAL PHYSIOLOGICAL DEVELOPMENT IN CHILDHOOD: ICD-10-CM

## 2021-08-05 LAB
ALBUMIN SERPL ELPH-MCNC: 3.4 G/DL — SIGNIFICANT CHANGE UP (ref 3.3–5)
ALP SERPL-CCNC: 77 U/L — SIGNIFICANT CHANGE UP (ref 40–120)
ALT FLD-CCNC: 37 U/L — SIGNIFICANT CHANGE UP (ref 12–78)
ANION GAP SERPL CALC-SCNC: 8 MMOL/L — SIGNIFICANT CHANGE UP (ref 5–17)
AST SERPL-CCNC: 18 U/L — SIGNIFICANT CHANGE UP (ref 15–37)
BASOPHILS # BLD AUTO: 0 K/UL — SIGNIFICANT CHANGE UP (ref 0–0.2)
BASOPHILS NFR BLD AUTO: 0 % — SIGNIFICANT CHANGE UP (ref 0–2)
BILIRUB SERPL-MCNC: 0.4 MG/DL — SIGNIFICANT CHANGE UP (ref 0.2–1.2)
BUN SERPL-MCNC: 16 MG/DL — SIGNIFICANT CHANGE UP (ref 7–23)
CALCIUM SERPL-MCNC: 9.2 MG/DL — SIGNIFICANT CHANGE UP (ref 8.5–10.1)
CHLORIDE SERPL-SCNC: 102 MMOL/L — SIGNIFICANT CHANGE UP (ref 96–108)
CO2 SERPL-SCNC: 30 MMOL/L — SIGNIFICANT CHANGE UP (ref 22–31)
CREAT SERPL-MCNC: 0.66 MG/DL — SIGNIFICANT CHANGE UP (ref 0.5–1.3)
EOSINOPHIL # BLD AUTO: 0.02 K/UL — SIGNIFICANT CHANGE UP (ref 0–0.5)
EOSINOPHIL NFR BLD AUTO: 0.5 % — SIGNIFICANT CHANGE UP (ref 0–6)
FLUAV AG NPH QL: SIGNIFICANT CHANGE UP
FLUBV AG NPH QL: SIGNIFICANT CHANGE UP
GLUCOSE SERPL-MCNC: 132 MG/DL — HIGH (ref 70–99)
HCT VFR BLD CALC: 43.3 % — SIGNIFICANT CHANGE UP (ref 39–50)
HGB BLD-MCNC: 15.2 G/DL — SIGNIFICANT CHANGE UP (ref 13–17)
IMM GRANULOCYTES NFR BLD AUTO: 0.2 % — SIGNIFICANT CHANGE UP (ref 0–1.5)
LACTATE SERPL-SCNC: 2.1 MMOL/L — HIGH (ref 0.7–2)
LYMPHOCYTES # BLD AUTO: 1.04 K/UL — SIGNIFICANT CHANGE UP (ref 1–3.3)
LYMPHOCYTES # BLD AUTO: 25.2 % — SIGNIFICANT CHANGE UP (ref 13–44)
MCHC RBC-ENTMCNC: 30.8 PG — SIGNIFICANT CHANGE UP (ref 27–34)
MCHC RBC-ENTMCNC: 35.1 GM/DL — SIGNIFICANT CHANGE UP (ref 32–36)
MCV RBC AUTO: 87.8 FL — SIGNIFICANT CHANGE UP (ref 80–100)
MONOCYTES # BLD AUTO: 0.27 K/UL — SIGNIFICANT CHANGE UP (ref 0–0.9)
MONOCYTES NFR BLD AUTO: 6.5 % — SIGNIFICANT CHANGE UP (ref 2–14)
NEUTROPHILS # BLD AUTO: 2.79 K/UL — SIGNIFICANT CHANGE UP (ref 1.8–7.4)
NEUTROPHILS NFR BLD AUTO: 67.6 % — SIGNIFICANT CHANGE UP (ref 43–77)
NRBC # BLD: 0 /100 WBCS — SIGNIFICANT CHANGE UP (ref 0–0)
PLATELET # BLD AUTO: 112 K/UL — LOW (ref 150–400)
POTASSIUM SERPL-MCNC: 3.9 MMOL/L — SIGNIFICANT CHANGE UP (ref 3.5–5.3)
POTASSIUM SERPL-SCNC: 3.9 MMOL/L — SIGNIFICANT CHANGE UP (ref 3.5–5.3)
PROT SERPL-MCNC: 7.6 GM/DL — SIGNIFICANT CHANGE UP (ref 6–8.3)
RBC # BLD: 4.93 M/UL — SIGNIFICANT CHANGE UP (ref 4.2–5.8)
RBC # FLD: 12.9 % — SIGNIFICANT CHANGE UP (ref 10.3–14.5)
SARS-COV-2 RNA SPEC QL NAA+PROBE: SIGNIFICANT CHANGE UP
SODIUM SERPL-SCNC: 140 MMOL/L — SIGNIFICANT CHANGE UP (ref 135–145)
WBC # BLD: 4.13 K/UL — SIGNIFICANT CHANGE UP (ref 3.8–10.5)
WBC # FLD AUTO: 4.13 K/UL — SIGNIFICANT CHANGE UP (ref 3.8–10.5)

## 2021-08-05 PROCEDURE — 71045 X-RAY EXAM CHEST 1 VIEW: CPT | Mod: 26

## 2021-08-05 PROCEDURE — 70450 CT HEAD/BRAIN W/O DYE: CPT | Mod: 26,MA

## 2021-08-05 PROCEDURE — 93010 ELECTROCARDIOGRAM REPORT: CPT

## 2021-08-05 PROCEDURE — 99284 EMERGENCY DEPT VISIT MOD MDM: CPT

## 2021-08-05 RX ORDER — LEVETIRACETAM 250 MG/1
1 TABLET, FILM COATED ORAL
Qty: 60 | Refills: 0
Start: 2021-08-05 | End: 2021-09-03

## 2021-08-05 RX ORDER — LEVETIRACETAM 250 MG/1
1000 TABLET, FILM COATED ORAL ONCE
Refills: 0 | Status: DISCONTINUED | OUTPATIENT
Start: 2021-08-05 | End: 2021-08-05

## 2021-08-05 RX ORDER — LEVETIRACETAM 250 MG/1
1000 TABLET, FILM COATED ORAL ONCE
Refills: 0 | Status: COMPLETED | OUTPATIENT
Start: 2021-08-05 | End: 2021-08-05

## 2021-08-05 RX ADMIN — LEVETIRACETAM 400 MILLIGRAM(S): 250 TABLET, FILM COATED ORAL at 21:33

## 2021-08-05 NOTE — ED PROVIDER NOTE - CLINICAL SUMMARY MEDICAL DECISION MAKING FREE TEXT BOX
seizure noted otherwise CT head pending, labs performed  - seizure noted otherwise CT head pending, labs performed  - no noted significant pathology. Dr. Torres called to consult regarding seizure episode, otherwise to start on Keppra in ED and may dc with 500mg BID Keppra.

## 2021-08-05 NOTE — ED PROVIDER NOTE - PATIENT PORTAL LINK FT
You can access the FollowMyHealth Patient Portal offered by [facility] by registering at the following website: [email or web address]. By joining [employer]’s FollowMyHealth portal, you will also be able to view your health information using other applications (apps) compatible with our system.

## 2021-08-05 NOTE — ED PROVIDER NOTE - NSFOLLOWUPINSTRUCTIONS_ED_ALL_ED_FT
Epilepsy    WHAT YOU NEED TO KNOW:    Epilepsy is a brain disorder that causes seizures. It is also called a seizure disorder. A seizure means an abnormal area in your brain sometimes sends bursts of electrical activity. A seizure may start in one part of your brain, or both sides may be affected. Depending on the type of seizure, you may have movements you cannot control, lose consciousness, or stare straight ahead. You may be confused or tired after the seizure. A seizure may last a few seconds or longer than 5 minutes. A birth defect, tumor, stroke, dementia, injury, or infection may cause epilepsy. The cause of your epilepsy may not be known. If your seizures are not controlled, epilepsy may become life-threatening.    DISCHARGE INSTRUCTIONS:    Call your local emergency number (911 in the ), or have someone else call, for any of the following:   •Your seizure lasts longer than 5 minutes.      •You have trouble breathing after a seizure.      •You have diabetes or are pregnant and have a seizure.      •You have a seizure in water, such as a swimming pool or bathtub.      Call your doctor if:   •You have a second seizure within 24 hours of the first.       •You are injured during a seizure.       •You feel you are not able to cope with your condition.      •Your seizures start to happen more often.      •You are confused longer than usual after a seizure.      •You are planning to get pregnant or are currently pregnant.      •You have questions or concerns about your condition or care.      Medicines:   •Antiseizure medicine may control or prevent another seizure. Do not stop taking this medicine. Another person may need to give you rescue medicine to stop a seizure at home. Ask your healthcare provider for more information about rescue medicine.       •Take your medicine as directed. Contact your healthcare provider if you think your medicine is not helping or if you have side effects. Tell him of her if you are allergic to any medicine. Keep a list of the medicines, vitamins, and herbs you take. Include the amounts, and when and why you take them. Bring the list or the pill bottles to follow-up visits. Carry your medicine list with you in case of an emergency.      Follow up with your neurologist as directed: You may need tests to check the level of antiseizure medicine in your blood. Your neurologist may need to change or adjust your medicine. Write down your questions so you remember to ask them during your visits.     Prevent a complication of epilepsy:   •Sudden unexplained death in epilepsy (SUDEP) is a rare complication of epilepsy. In 1 year, 1 adult in 1,000 adults with epilepsy will have this complication. The risk of SUDEP increases if you have 3 or more generalized tonic-clonic seizures in 1 year. Your risk also increases if you have nocturnal seizures (seizures during sleep). After a nocturnal seizure, your breathing can become shallow.      •Your healthcare provider may recommend a change in medicine to decrease the number of seizures. For nocturnal seizures, he or she may recommend that someone sleep near you. The person must be older than 10 years. The person must also be close enough to know that you are having a seizure.      What you can do to prevent a seizure: You may not be able to prevent every seizure. The following can help you manage triggers that may make a seizure start:   •Take your medicine every day at the same time. This will also help prevent medicine side effects. Set an alarm to help remind you to take your medicine every day.       •Manage stress. Stress can be a trigger for epilepsy. Exercise can help you reduce stress. Talk to your healthcare provider about exercise that is safe for you. Illness can be a form of stress. Eat a variety of healthy foods and drink plenty of liquids during an illness. Talk to your healthcare provider about other ways to manage stress.      •Set a regular sleep schedule. A lack of sleep can trigger a seizure. Try to go to sleep and wake up at the same time every day. Keep your bedroom quiet and dark. Talk to your healthcare provider if you are having trouble sleeping.      •Limit or do not drink alcohol as directed. Alcohol can trigger a seizure, especially if you drink a large amount at one time. A drink of alcohol is 12 ounces of beer, 1½ ounces of liquor, or 5 ounces of wine. Talk to your healthcare provider about a safe amount of alcohol for you. Your provider may recommend that you do not drink any alcohol. Tell him or her if you need help to quit drinking.      What you can do to manage epilepsy:   •Keep a seizure diary. This can help you find your triggers and avoid them. Write down the dates of your seizures, where you were, and what you were doing. Include how you felt before and after. Possible triggers include illness, lack of sleep, hormonal changes, alcohol, drugs, lights, or stress.       •Record any auras you have before a seizure. An aura is a sign that you are about to have a seizure. Auras happen before certain types of seizures that are in only 1 part of the brain. The aura may happen seconds before a seizure, or up to an hour before. You may feel, see, hear, or smell something. Examples include part of your body becoming hot. You may see a flash of light or hear something. You may have anxiety or déjà vu. If you have an aura, include it in your seizure diary.      •Create a care plan. Tell family, friends, and coworkers about your epilepsy. Give them instructions that tell them how they can keep you safe if you have a seizure.      •Find support. You may be referred to a psychologist or . Ask your healthcare provider about support groups for people with epilepsy.       •Ask what safety precautions you should take. Talk with your healthcare provider about driving. You may not be able to drive until you are seizure-free for a period of time. You will need to check the law where you live. Also talk to your healthcare provider about swimming and bathing. You may drown or develop life-threatening heart or lung damage if you have a seizure in water.      •Carry medical alert identification. Wear medical alert jewelry or carry a card that says you have epilepsy. Ask your healthcare provider where to get these items.  Medical Alert Jewelry           How others can keep you safe during a seizure: Give the following instructions to family, friends, and coworkers:   •Do not panic.      •Do not hold me down or put anything in my mouth.      •Gently guide me to the floor or a soft surface.       •Place me on my side to help prevent me from swallowing saliva or vomit.  First Aid: Seizures (ADULT)           •Protect me from injury. Remove sharp or hard objects from the area surrounding me, or cushion my head.      •Loosen the clothing around my head and neck.       •Time how long my seizure lasts. Call 911 if my seizure lasts longer than 5 minutes or if I have a second seizure.      •Stay with me until my seizure ends. Let me rest until I am fully awake.       •Perform CPR if I stop breathing or you cannot feel my pulse.       •Do not give me anything to eat or drink until I am fully awake.

## 2021-08-05 NOTE — ED PROVIDER NOTE - CARE PROVIDER_API CALL
Bradley Torres  NEUROLOGY - GENERAL  [address] [place] [address]  Phone: [phone]  Fax: [phone]  Follow Up Time: 1-3 Days

## 2021-08-05 NOTE — ED ADULT NURSE NOTE - BEFAST EYES
Permanent atrial fibrillation  Micra leadless pacemaker in situ for AV block  Chads Vascor of 4    Continue anticoagulation.  Labs are up-to-date, CBC renal function remained stable   No

## 2021-08-05 NOTE — ED ADULT TRIAGE NOTE - CHIEF COMPLAINT QUOTE
54 Y/O MALE WITH pmh OF seizure. Presents to the ED with c/c of seizure that lasted 90 seconds at 1830.

## 2021-08-05 NOTE — ED PROVIDER NOTE - OBJECTIVE STATEMENT
55 year old male with PMH of developmental delay, autism, at baseline is able to walk 1-2 steps, contracted arms, remote seizure hx not on meds otherwise presenting to ED due to seizure episode lasting 90 seconds from Group home. States pt now back to baseline mental status.

## 2021-08-11 ENCOUNTER — APPOINTMENT (OUTPATIENT)
Dept: UROLOGY | Facility: CLINIC | Age: 56
End: 2021-08-11
Payer: MEDICAID

## 2021-08-11 DIAGNOSIS — R32 UNSPECIFIED URINARY INCONTINENCE: ICD-10-CM

## 2021-08-11 DIAGNOSIS — N40.1 BENIGN PROSTATIC HYPERPLASIA WITH LOWER URINARY TRACT SYMPMS: ICD-10-CM

## 2021-08-11 DIAGNOSIS — N13.8 BENIGN PROSTATIC HYPERPLASIA WITH LOWER URINARY TRACT SYMPMS: ICD-10-CM

## 2021-08-11 PROCEDURE — 99213 OFFICE O/P EST LOW 20 MIN: CPT

## 2021-08-11 PROCEDURE — 51798 US URINE CAPACITY MEASURE: CPT

## 2021-08-17 ENCOUNTER — EMERGENCY (EMERGENCY)
Facility: HOSPITAL | Age: 56
LOS: 1 days | Discharge: HOME | End: 2021-08-17
Attending: STUDENT IN AN ORGANIZED HEALTH CARE EDUCATION/TRAINING PROGRAM | Admitting: STUDENT IN AN ORGANIZED HEALTH CARE EDUCATION/TRAINING PROGRAM
Payer: MEDICAID

## 2021-08-17 VITALS
RESPIRATION RATE: 20 BRPM | HEART RATE: 111 BPM | TEMPERATURE: 98 F | SYSTOLIC BLOOD PRESSURE: 117 MMHG | HEIGHT: 70 IN | DIASTOLIC BLOOD PRESSURE: 70 MMHG | OXYGEN SATURATION: 97 %

## 2021-08-17 LAB
ALBUMIN SERPL ELPH-MCNC: 4 G/DL — SIGNIFICANT CHANGE UP (ref 3.3–5)
ALP SERPL-CCNC: 78 U/L — SIGNIFICANT CHANGE UP (ref 40–120)
ALT FLD-CCNC: 23 U/L — SIGNIFICANT CHANGE UP (ref 4–41)
ANION GAP SERPL CALC-SCNC: 13 MMOL/L — SIGNIFICANT CHANGE UP (ref 7–14)
AST SERPL-CCNC: 31 U/L — SIGNIFICANT CHANGE UP (ref 4–40)
BASOPHILS # BLD AUTO: 0.02 K/UL — SIGNIFICANT CHANGE UP (ref 0–0.2)
BASOPHILS NFR BLD AUTO: 0.1 % — SIGNIFICANT CHANGE UP (ref 0–2)
BILIRUB SERPL-MCNC: 0.2 MG/DL — SIGNIFICANT CHANGE UP (ref 0.2–1.2)
BUN SERPL-MCNC: 10 MG/DL — SIGNIFICANT CHANGE UP (ref 7–23)
CALCIUM SERPL-MCNC: 9.4 MG/DL — SIGNIFICANT CHANGE UP (ref 8.4–10.5)
CHLORIDE SERPL-SCNC: 99 MMOL/L — SIGNIFICANT CHANGE UP (ref 98–107)
CO2 SERPL-SCNC: 26 MMOL/L — SIGNIFICANT CHANGE UP (ref 22–31)
CREAT SERPL-MCNC: 0.56 MG/DL — SIGNIFICANT CHANGE UP (ref 0.5–1.3)
EOSINOPHIL # BLD AUTO: 0 K/UL — SIGNIFICANT CHANGE UP (ref 0–0.5)
EOSINOPHIL NFR BLD AUTO: 0 % — SIGNIFICANT CHANGE UP (ref 0–6)
GLUCOSE SERPL-MCNC: 102 MG/DL — HIGH (ref 70–99)
HCT VFR BLD CALC: 40 % — SIGNIFICANT CHANGE UP (ref 39–50)
HGB BLD-MCNC: 13.7 G/DL — SIGNIFICANT CHANGE UP (ref 13–17)
IANC: 14.88 K/UL — HIGH (ref 1.5–8.5)
IMM GRANULOCYTES NFR BLD AUTO: 0.5 % — SIGNIFICANT CHANGE UP (ref 0–1.5)
LYMPHOCYTES # BLD AUTO: 0.99 K/UL — LOW (ref 1–3.3)
LYMPHOCYTES # BLD AUTO: 6 % — LOW (ref 13–44)
MCHC RBC-ENTMCNC: 31.6 PG — SIGNIFICANT CHANGE UP (ref 27–34)
MCHC RBC-ENTMCNC: 34.3 GM/DL — SIGNIFICANT CHANGE UP (ref 32–36)
MCV RBC AUTO: 92.2 FL — SIGNIFICANT CHANGE UP (ref 80–100)
MONOCYTES # BLD AUTO: 0.59 K/UL — SIGNIFICANT CHANGE UP (ref 0–0.9)
MONOCYTES NFR BLD AUTO: 3.6 % — SIGNIFICANT CHANGE UP (ref 2–14)
NEUTROPHILS # BLD AUTO: 14.88 K/UL — HIGH (ref 1.8–7.4)
NEUTROPHILS NFR BLD AUTO: 89.8 % — HIGH (ref 43–77)
NRBC # BLD: 0 /100 WBCS — SIGNIFICANT CHANGE UP
NRBC # FLD: 0 K/UL — SIGNIFICANT CHANGE UP
PLATELET # BLD AUTO: 206 K/UL — SIGNIFICANT CHANGE UP (ref 150–400)
POTASSIUM SERPL-MCNC: 4.1 MMOL/L — SIGNIFICANT CHANGE UP (ref 3.5–5.3)
POTASSIUM SERPL-SCNC: 4.1 MMOL/L — SIGNIFICANT CHANGE UP (ref 3.5–5.3)
PROT SERPL-MCNC: 7.3 G/DL — SIGNIFICANT CHANGE UP (ref 6–8.3)
RBC # BLD: 4.34 M/UL — SIGNIFICANT CHANGE UP (ref 4.2–5.8)
RBC # FLD: 12.9 % — SIGNIFICANT CHANGE UP (ref 10.3–14.5)
SODIUM SERPL-SCNC: 138 MMOL/L — SIGNIFICANT CHANGE UP (ref 135–145)
WBC # BLD: 16.56 K/UL — HIGH (ref 3.8–10.5)
WBC # FLD AUTO: 16.56 K/UL — HIGH (ref 3.8–10.5)

## 2021-08-17 PROCEDURE — 71045 X-RAY EXAM CHEST 1 VIEW: CPT | Mod: 26

## 2021-08-17 PROCEDURE — 99284 EMERGENCY DEPT VISIT MOD MDM: CPT

## 2021-08-17 NOTE — ED PROVIDER NOTE - ATTENDING CONTRIBUTION TO CARE
I have personally performed a face to face medical and diagnostic evaluation of the patient. I have discussed with and reviewed the Resident's note and agree with the History, ROS, Physical Exam and MDM unless otherwise indicated. A brief summary of my personal evaluation and impression can be found below.    55M with PMH of developmental delay, autism, seizures, at baseline is able to walk 1-2 steps, contracted arms presents from assisted living facility w a cc of cough/sob, per email provided by staff member to ED team, there was c/f possible aspiration noted by staff member on tele visit w mild cough. Per staff member at bedside pt has had no fever, is acting like normal self. No other issues. Pt is non verbal and is unable to provide comprehensive history. Recently had PEG placed and is no longer taking PO.     Unable to obtain ROS at pt non verbal.     VITALS: Initial triage and subsequent vitals have been reviewed by me.  GEN APPEARANCE: WDWN, alert, non-toxic, NAD  HEAD: Atraumatic.  EYES: PERRLa, EOMI, vision grossly intact.   NECK: Supple  CV: RRR, S1S2, no c/r/m/g. Cap refill <2 seconds. No bruits.   LUNGS: faint trace? RLL crackles   ABDOMEN: Soft, NTND. No guarding or rebound.   MSK/EXT: No spinal or extremity point tenderness. No CVA ttp. Pelvis stable. No peripheral edema.  NEURO: Alert, at baseline per staff  SKIN: Warm, dry and intact. No rash.  PSYCH: baseline per staff     Plan/MDM: 55M with PMH of developmental delay, autism, seizures, at baseline is able to walk 1-2 steps, contracted arms presents from assisted living facility w a cc of cough/sob, per email provided by staff member to ED team, there was c/f possible aspiration noted by staff member on tele visit w mild cough. exam vss non toxic, no o2 requirement, plan to check labs cxr eval for asp/pna, low c/f PE as not tachy, no LE edema, less c/f infectious process as no fever, reassess.

## 2021-08-17 NOTE — ED ADULT NURSE NOTE - OBJECTIVE STATEMENT
Received patient to room 22 for cough. From group home, nonverbal and nonambulatory at baseline, hx of CP, contracted, staff brought pt in after video call with oncall doctor, per the email, the MD stating pt was lethargic an had productive cough, unable to clear secretions. RR even and unlabored, o2 100% on RA at this time. Pt moaning, per staff this is patients baseline. Noted to have PEG tube and colostomy, sites clean and intact.  Afebrile at this time, NSR on monitor. Skin intact.

## 2021-08-17 NOTE — ED ADULT NURSE NOTE - NSIMPLEMENTINTERV_GEN_ALL_ED
Implemented All Fall Risk Interventions:  [place] to call system. Call bell, personal items and telephone within reach. Instruct patient to call for assistance. Room bathroom lighting operational. Non-slip footwear when patient is off stretcher. Physically safe environment: no spills, clutter or unnecessary equipment. Stretcher in lowest position, wheels locked, appropriate side rails in place. Provide visual cue, wrist band, yellow gown, etc. Monitor gait and stability. Monitor for mental status changes and reorient to person, place, and time. Review medications for side effects contributing to fall risk. Reinforce activity limits and safety measures with patient and family.

## 2021-08-17 NOTE — ED PROVIDER NOTE - PROGRESS NOTE DETAILS
Russell BRYSON: Pt assessed at beside. Pt resting comfortably, pain controlled. Vital signs stable. discussed results w staff member at bedside, stable for dc w pmd fu, staff member comfortable w pt returning to facility, she also reports he is at baseline mental status, pt is in monitored setting, discussed strict return precautions w staff to return to ed. noted wbc however pt w/o fever in ed, cxr clear, satting well

## 2021-08-17 NOTE — ED PROVIDER NOTE - PATIENT PORTAL LINK FT
You can access the FollowMyHealth Patient Portal offered by [facility] by registering at the following website: [email or web address]. By joining [employer]’s FollowMyHealth portal, you will also be able to view your health information using other applications (apps) compatible with our system.

## 2021-08-17 NOTE — ED ADULT NURSE NOTE - TEMPLATE
[facility] [place], NOSE AND THROAT  [address] Ak[address] [address]  Dept: [phone]  Dept Fax: [phone]  Loc: [phone]    Pat Cardenas is a 64 y.o. male who was referred byNo ref. provider found for:  Chief Complaint   Patient presents with    Sinus Problem     Patient is here for rountine sinus workup CT in 4 weeks - check tongue also   . HPI:     Pat Cardenas is a 64 y.o. male who presents today for results of his CT scan. Results were reviewed with the patient. CT Facial Bones results:  Impression       1. No evidence to suggest acute sinusitis.       2. There is mucosal thickening within the ethmoid air cells. Mucosal coaptation causes narrowing of the right ostiomeatal unit.       3. The nasal septum is deviated towards the left. 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       4. Degenerative changes are noted involving the left temporomandibular joint.                   **This report has been created using voice recognition software. It may contain minor errors which are inherent in voice recognition technology. **       Final report electronically signed by Dr. Marylene Lair on 10/19/2018 4:01 PM     He states he feels like his left side is more congested than the right. He did not use anything in his nose this morning to open it up. He does occasionally use the Atrovent and Patanase for his nose. My review the CT shows essentially straight septum with a good airway on both sides, minimal asymmetry, scattered minimal mucosal thickening. He has not had any facial pain. He does have some left sided head pressure. He does know he has TMJ on the left side. He got a new CPAP machine and mask. He has a completely different type of mask now. He is sleeping much better now. He did also get the  for the machine. History:      Allergies   Allergen Reactions    Tramadol Other (See Comments)
General

## 2021-08-17 NOTE — ED ADULT TRIAGE NOTE - CHIEF COMPLAINT QUOTE
Initial SW/CM Assessment/Plan of Care Note     Baseline Assessment  70year old admitted 1/18/2018 as Inpatient with a diagnosis of COPD and acute on chronic hypercarbic respiratory failure. Prior to admission patient was living with Spouse/significant other and residing at [facility]. Patient does not  have a Power of  for [employer]. We discussed the importance of Advance Directives and a blank document was provided for his review. The patient is aware that SW can assist with completion and witnessing should he desire.  Patientâs Primary Care Provider is Isabella Hoyt MD.     Medical History  Past Medical History:   Diagnosis Date   â¢ Anxiety    â¢ COPD (chronic obstructive pulmonary disease) (CMS/[facility])    â¢ DM type 2 (diabetes mellitus, type 2) (CMS/[facility])     diet controlled   â¢ GERD (esophageal reflux)    â¢ Hypercholesteremia    â¢ Hypertension    â¢ Hypoxia     4 L O2 NC   â¢ Psoriasis    â¢ Sleep apnea     BiPAP   â¢ Wears glasses        Prior to Admission Status  Functional Status  Ambulation: Independent/Self  Bathing: Independent/Self  Dressing: Independent/Self  Toileting: Independent/Self  Meal Preparation: Significant Other  Shopping: Significant Other  Medication Preparation: Significant Other  Medication Administration: Significant Other  Housekeeping: Significant Other  Laundry: Significant Other  Transportation: Independent/Self    Agency/Support  Type of Services Prior to Hospitalization: None  Support Systems: Spouse/Significant other  Home Devices/Equipment: Home Oxygen (T)  Mobility Assist Devices: None  Sensory Support Devices: Eyeglasses    Current Status  PT Ambulation Tips: Use walker, Use gait belt, Needs minimal assist, Walk in daley  PT Transfer Tips:    OT Bathing Tips:    OT Dressing Tips:    OT Toileting Tips:    OT Feeding Tips:    SLP Swallow/Feeding Tips:    SLP Comm/Cog Tips:    Current Mental Status: Cooperative, Pleasant  Stressors:      Insurance  Primary: MEDICARE  Secondary: WEA    Barriers to Discharge  Identified Barriers to Discharge/Transition Planning: Assessment/stabilization in progress    Progress Note  SW received a trigger for home with on-going services. SW met with patient in his room. He was alert and receptive to the visit but the conversation was brief because he was wearing his mask and he was difficult to understand at times. We discussed the role of SW in discharge planning. Prior to this hospitalization the patient reports that he was living in his own home with his spouse. She was primarily responsible for tasks like cooking, cleaning, laundry, shopping, medication management etc.  The patient reports that he was independent with his personal care needs including bathing, dressing, toileting etc.  The patient reports that he was driving independently as well. The patient wishes to return home post discharge with no additional services. We discussed the current recommendations from PT for home PT and ST for continued ST. The patient states he does not want in home services. He states he has no intention of being home bound. He states he will follow up with both PT and ST as an outpatient. He denies any other needs upon discharge. SW to continue to follow as needed with discharge planning. Plan  SW/CM - Recommendations for Discharge: 24 Hour assist, Home  PT - Recommendations for Discharge: Home, Home therapy, 24 Hour assist  OT - Recommendations for Discharge:    SLP - Recommendations for Discharge: Post acute speech therapy  Anticipate patient will need post-hospital services. Necessary services are available. Anticipate patient can return to the environment from which patient entered the hospital.   Anticipate patient can provide self-care at discharge. Refer to SW/CM Flowsheet for Goals and objective data. presents from [facility] for cough. respirations even and unlabored. PMH cerebral palsy autism seizure patient appears comfortable. aid at bedside.

## 2021-08-17 NOTE — ED PROVIDER NOTE - CARE PLAN
Assessment and plan of treatment:	55 year old male with PMH of developmental delay, autism, at baseline is able to walk 1-2 steps, contracted arms, remote seizure hx with last seizure about 2 weeks ago now with SOB and increase phlegm. Patient afebrile but tachy. crackles appreciated on right lung. Will obtain x-ray and basic labs to r/o asp pneumonia.   1 Principal Discharge DX:	Cough  Assessment and plan of treatment:	55 year old male with PMH of developmental delay, autism, at baseline is able to walk 1-2 steps, contracted arms, remote seizure hx with last seizure about 2 weeks ago now with SOB and increase phlegm. Patient afebrile but tachy. crackles appreciated on right lung. Will obtain x-ray and basic labs to r/o asp pneumonia.

## 2021-08-17 NOTE — ED PROVIDER NOTE - NSFOLLOWUPINSTRUCTIONS_ED_ALL_ED_FT
Thank you for visiting our Emergency Department, it has been a pleasure taking part in your healthcare.    Your discharge diagnosis is: cough  Please take all discharge medications as indicated below:  Please continue all medications as rx'd by your PMD.  Please follow up with your PMD within x48 hours.  A copy of resulted labs, imaging, and findings have been provided to you.   You have had a detailed discussion with your provider regarding your diagnosis, care management and discharge planning including, but not limited to: return precautions, follow up visits with existing or new providers, new prescriptions and/or medication changes, wound and/or splint/cast care or other care   aspects specific to your diagnosis and treatment. You have been given the opportunity to have your questions answered. At this time you have been deemed stable and fit for discharge.  Return precautions to the Emergency Department include but are not limited to: unrelenting nausea, vomiting, fever, chills, chest pain, shortness of breath, dizziness, chest or abdominal pain, worsening back pain, syncope, blood in urine or stool, headache that doesn't resolve, numbness or tingling, loss of sensation, loss of motor function, or any other concerning symptoms.

## 2021-08-17 NOTE — ED PROVIDER NOTE - OBJECTIVE STATEMENT
55 year old male with PMH of developmental delay, autism, at baseline is able to walk 1-2 steps, contracted arms, remote seizure hx with last seizure about 2 weeks ago now presenting with increased secretions and SOB. Per group home where patient came from he was coughing and having deeper inspiratory efforts for last few days. Patient w/o fever and chills.

## 2021-08-17 NOTE — ED PROVIDER NOTE - PHYSICAL EXAMINATION
PHYSICAL EXAM:  T(C): 36.7 (08-17-21 @ 20:46), Max: 36.7 (08-17-21 @ 20:46)  HR: 111 (08-17-21 @ 20:46) (111 - 111)  BP: 117/70 (08-17-21 @ 20:46) (117/70 - 117/70)  RR: 20 (08-17-21 @ 20:46) (20 - 20)  SpO2: 97% (08-17-21 @ 20:46) (97% - 97%)    GENERAL: appears to be coughing with phlegm, NAD  HEAD:  Atraumatic, Normocephalic  EYES: EOMI, PERRLA, conjunctiva and sclera clear  NECK: Supple, No JVD  CHEST/LUNG: exp>insp crackles on right lung; No wheeze  HEART: Regular rate and rhythm; No murmurs, rubs, or gallops  ABDOMEN: Soft, Nontender, Nondistended; Bowel sounds present  EXTREMITIES:  2+ Peripheral Pulses, No clubbing, cyanosis, or edema  PSYCH: nonverbal   NEUROLOGY: contracted, doesn't follow commands   SKIN: No rashes or lesions

## 2021-08-17 NOTE — ED PROVIDER NOTE - CLINICAL SUMMARY MEDICAL DECISION MAKING FREE TEXT BOX
55 year old male with PMH of developmental delay, autism, at baseline is able to walk 1-2 steps, contracted arms, remote seizure hx with last seizure about 2 weeks ago now presenting with increased secretions and SOB. Labs with elevated WBC but CXR w/o evidence of pna or infection and pt afebrile rectal. Even with PEG tube pt is at risk for aspiration but unlikely w/ at this time infection. OK for discharge home with PCP f/u.

## 2021-08-17 NOTE — ED PROVIDER NOTE - PLAN OF CARE
55 year old male with PMH of developmental delay, autism, at baseline is able to walk 1-2 steps, contracted arms, remote seizure hx with last seizure about 2 weeks ago now with SOB and increase phlegm. Patient afebrile but tachy. crackles appreciated on right lung. Will obtain x-ray and basic labs to r/o asp pneumonia.

## 2021-08-17 NOTE — ED ADULT NURSE NOTE - CHIEF COMPLAINT QUOTE
presents from [facility] for cough. respirations even and unlabored. PMH cerebral palsy autism seizure patient appears comfortable. aid at bedside.

## 2021-08-18 VITALS
DIASTOLIC BLOOD PRESSURE: 65 MMHG | SYSTOLIC BLOOD PRESSURE: 104 MMHG | RESPIRATION RATE: 16 BRPM | HEART RATE: 85 BPM | OXYGEN SATURATION: 100 % | TEMPERATURE: 98 F

## 2021-08-31 ENCOUNTER — EMERGENCY (EMERGENCY)
Facility: HOSPITAL | Age: 56
LOS: 1 days | Discharge: NURSING, REHAB OR LONG-TERM CARE | End: 2021-08-31
Attending: EMERGENCY MEDICINE | Admitting: EMERGENCY MEDICINE
Payer: MEDICAID

## 2021-08-31 VITALS
SYSTOLIC BLOOD PRESSURE: 104 MMHG | HEART RATE: 85 BPM | HEIGHT: 70 IN | TEMPERATURE: 98 F | OXYGEN SATURATION: 100 % | RESPIRATION RATE: 18 BRPM | DIASTOLIC BLOOD PRESSURE: 71 MMHG

## 2021-08-31 VITALS
DIASTOLIC BLOOD PRESSURE: 75 MMHG | OXYGEN SATURATION: 100 % | SYSTOLIC BLOOD PRESSURE: 108 MMHG | RESPIRATION RATE: 16 BRPM | TEMPERATURE: 98 F | HEART RATE: 80 BPM

## 2021-08-31 DIAGNOSIS — Z93.3 COLOSTOMY STATUS: Chronic | ICD-10-CM

## 2021-08-31 PROCEDURE — 99284 EMERGENCY DEPT VISIT MOD MDM: CPT

## 2021-08-31 NOTE — ED ADULT NURSE NOTE - OBJECTIVE STATEMENT
55 year old male presents awake and alert, non verbal at baseline due to PMH of cerebral palsy. Pt presenting from group home with staff at bedside. Pt presented to ER for  cough with phlegm unsure of color - x1 month that has been worsening. Respirations even and unlabored, sating 100% on room air, no accessory muscle use, afebrile. Swelling noted to left lower extremity, as per staff at bedside, swelling is chronic. Peg tube present, redness present at side without any drainage. Colostomy bag present also, no redness, swelling or drainage present at site. Non-verbal indicators 55 year old male presents awake and alert, non verbal at baseline due to PMH of cerebral palsy. Pt presenting from group home with staff at bedside. Pt presented to ER for  cough with phlegm unsure of color - x1 month that has been worsening. Respirations even and unlabored, sating 100% on room air, no accessory muscle use, afebrile. Swelling noted to left lower extremity, as per staff at bedside, swelling is chronic. Peg tube present, redness present at side without any drainage. Colostomy bag present also, no redness, swelling or drainage present at site. Non-verbal indicators of pain absent at this time. Pt in NAD. MD at bedside performing assessment. bed in lowest position, side rails up, call bell in hand, safety maintained. awaiting further orders. will continue to monitor.

## 2021-08-31 NOTE — ED PROVIDER NOTE - CLINICAL SUMMARY MEDICAL DECISION MAKING FREE TEXT BOX
56y/o M w/ h/o developmental disability (non verbal at baseline) c/b dysphagia s/p PEG tube, volvulus now with colostomy bag p/w chronic nonproductive cough worse after PEG placement 2 months ago. Afebrile, VSS. Suspect aspiration from PEG feeds. Will obtain CXR, possibly basic labs, COVID PCR.

## 2021-08-31 NOTE — ED PROVIDER NOTE - PHYSICAL EXAMINATION
GENERAL: NAD, tracking  HEAD:  Atraumatic, normocephalic  EYES: conjunctiva and sclera clear  MOUTH: poor dentition  ENT: Moist mucous membranes  NECK: Supple, no JVD  HEART: Regular rate and rhythm, no murmurs, rubs, or gallops  LUNGS: Unlabored respirations.  Clear to auscultation bilaterally, no crackles, wheezing, or rhonchi  ABDOMEN: Soft, nontender, nondistended. PEG in place, site appears c/d/i  EXTREMITIES: LLE with pitting edema and foot swelling compared to R  NERVOUS SYSTEM: alert but not oriented, unable to answer questions or follow commands  SKIN: No rashes or lesions

## 2021-08-31 NOTE — ED PROVIDER NOTE - OBJECTIVE STATEMENT
56y/o M w/ h/o developmental disability (non verbal at baseline) c/b dysphagia s/p PEG tube, volvulus now with colostomy bag presenting from group home with cough. Per caregiver at baseline, pt has had chronic nonproductive cough for months which worsened after having PEG tube placed around 2 months ago. Reports today he had discharge from his nose and was brought in for further eval. Pt has been acting at his normal baseline, does not appear SOB to caregiver. Has been afebrile at group home.

## 2021-08-31 NOTE — ED ADULT NURSE NOTE - HEART RATE (BEATS/MIN)
marisol patient   Would like a call back about some cramping that she is having since last night  No bleeding or leaking   
80

## 2021-08-31 NOTE — ED PROVIDER NOTE - ATTENDING CONTRIBUTION TO CARE
56yo M from [facility] with PMHX developmental disability (non verbal at baseline) with dysphagia and PEG tube, intestinal volvulus s/p colostomy bag, Seizures, presenting to ED for dry cough x 1 month.  Per Staff at bedside, supervisor at [facility] was particularly concerned as he was noted to have bursted blood vessels in his eyes and eye were red and he coughed so hard that phlegm came from his nares.  Otherwise patient at baseline without fevers, sob, or other changes.    Of note, pt came to ED 2 weeks ago for same, had CXR and labs and was discharged home    General: Patient in no apparent distress, contracted male in stretcher, AAO x 3  Skin: Dry and intact  HEENT: Head atraumatic. Oral mucosa moist.   Eyes: Conjunctiva normal  Cardiac: Regular rhythm and rate. No pretibial edema b/l  Respiratory: Lungs clear b/l and symmetric. No respiratory distress. Able to speak in complete sentences.  Gastrointestinal: Abdomen soft, nondistended, nontender  Musculoskeletal: Moves all extremities spontaneously  Neurological: alert, moves all extremities  Psychiatric: Calm and cooperative    a/p  cough likely d/t poor functional status and mobility and aspiration risk  will get screening cxr  pt is fully covid19 vaccinated  will likely dc home

## 2021-08-31 NOTE — ED ADULT TRIAGE NOTE - CHIEF COMPLAINT QUOTE
Pt arrives via EMS from group home residence accompanied by staff. Pt non-verbal at baseline. Hx of MR, CP. As per EMS, pt has been coughing and producing phlegm x1 month. Pt arrives with G-tube in place and colostomy bag. Staff reports pt was here approximately 2 weeks ago for same.

## 2021-08-31 NOTE — ED PROVIDER NOTE - PATIENT PORTAL LINK FT
You can access the FollowMyHealth Patient Portal offered by [facility] by registering at the following website: [email or web address]. By joining [facility]’s FollowMyHealth portal, you will also be able to view your health information using other applications (apps) compatible with our system.

## 2021-09-01 LAB — SARS-COV-2 RNA SPEC QL NAA+PROBE: SIGNIFICANT CHANGE UP

## 2021-09-01 PROCEDURE — 71045 X-RAY EXAM CHEST 1 VIEW: CPT | Mod: 26

## 2021-09-01 NOTE — PROVIDER CONTACT NOTE (OTHER) - RECOMMENDATIONS
Pt to travel via agency arranged [employer] transportation; pt will be accompanied by staff member at bedside; verbal huddle complete.

## 2021-09-01 NOTE — PROVIDER CONTACT NOTE (OTHER) - ASSESSMENT
SW called [facility], [phone], spoke with Radha, who reports she will attempt to get agency transport for pt d.c home; pt needs to be accompanied by staff member when transported due to developmental delay/autism. SW called [facility], [phone], spoke with Radha, who reports she will attempt to get agency transport for pt d.c home; pt needs to be accompanied by staff member when transported due to developmental delay/autism.  Addedum: as per Gabriella, the agency has arranged for [employer] transportation for pt transport home.  She will accompany pt in the vehicle.

## 2021-10-14 ENCOUNTER — INPATIENT (INPATIENT)
Facility: HOSPITAL | Age: 56
LOS: 26 days | Discharge: NURSING, REHAB OR LONG-TERM CARE | End: 2021-11-10
Attending: STUDENT IN AN ORGANIZED HEALTH CARE EDUCATION/TRAINING PROGRAM | Admitting: STUDENT IN AN ORGANIZED HEALTH CARE EDUCATION/TRAINING PROGRAM
Payer: MEDICAID

## 2021-10-14 VITALS
SYSTOLIC BLOOD PRESSURE: 111 MMHG | HEART RATE: 117 BPM | OXYGEN SATURATION: 85 % | DIASTOLIC BLOOD PRESSURE: 65 MMHG | TEMPERATURE: 98 F | HEIGHT: 70 IN | RESPIRATION RATE: 26 BRPM

## 2021-10-14 DIAGNOSIS — Z29.9 ENCOUNTER FOR PROPHYLACTIC MEASURES, UNSPECIFIED: ICD-10-CM

## 2021-10-14 DIAGNOSIS — Z93.3 COLOSTOMY STATUS: Chronic | ICD-10-CM

## 2021-10-14 DIAGNOSIS — R45.1 RESTLESSNESS AND AGITATION: ICD-10-CM

## 2021-10-14 DIAGNOSIS — G40.909 EPILEPSY, UNSPECIFIED, NOT INTRACTABLE, WITHOUT STATUS EPILEPTICUS: ICD-10-CM

## 2021-10-14 DIAGNOSIS — Z93.1 GASTROSTOMY STATUS: ICD-10-CM

## 2021-10-14 DIAGNOSIS — R33.9 RETENTION OF URINE, UNSPECIFIED: ICD-10-CM

## 2021-10-14 DIAGNOSIS — Z98.890 OTHER SPECIFIED POSTPROCEDURAL STATES: Chronic | ICD-10-CM

## 2021-10-14 DIAGNOSIS — Z90.49 ACQUIRED ABSENCE OF OTHER SPECIFIED PARTS OF DIGESTIVE TRACT: Chronic | ICD-10-CM

## 2021-10-14 DIAGNOSIS — J18.9 PNEUMONIA, UNSPECIFIED ORGANISM: ICD-10-CM

## 2021-10-14 DIAGNOSIS — A41.9 SEPSIS, UNSPECIFIED ORGANISM: ICD-10-CM

## 2021-10-14 DIAGNOSIS — K59.00 CONSTIPATION, UNSPECIFIED: ICD-10-CM

## 2021-10-14 DIAGNOSIS — Z87.898 PERSONAL HISTORY OF OTHER SPECIFIED CONDITIONS: ICD-10-CM

## 2021-10-14 LAB
ALBUMIN SERPL ELPH-MCNC: 3.8 G/DL — SIGNIFICANT CHANGE UP (ref 3.3–5)
ALP SERPL-CCNC: 84 U/L — SIGNIFICANT CHANGE UP (ref 40–120)
ALT FLD-CCNC: 17 U/L — SIGNIFICANT CHANGE UP (ref 4–41)
ANION GAP SERPL CALC-SCNC: 12 MMOL/L — SIGNIFICANT CHANGE UP (ref 7–14)
APPEARANCE UR: ABNORMAL
AST SERPL-CCNC: 20 U/L — SIGNIFICANT CHANGE UP (ref 4–40)
B PERT DNA SPEC QL NAA+PROBE: SIGNIFICANT CHANGE UP
B PERT+PARAPERT DNA PNL SPEC NAA+PROBE: SIGNIFICANT CHANGE UP
BACTERIA # UR AUTO: NEGATIVE — SIGNIFICANT CHANGE UP
BASE EXCESS BLDV CALC-SCNC: -3 MMOL/L — LOW (ref -2–3)
BASOPHILS # BLD AUTO: 0.01 K/UL — SIGNIFICANT CHANGE UP (ref 0–0.2)
BASOPHILS NFR BLD AUTO: 0.1 % — SIGNIFICANT CHANGE UP (ref 0–2)
BILIRUB SERPL-MCNC: 0.5 MG/DL — SIGNIFICANT CHANGE UP (ref 0.2–1.2)
BILIRUB UR-MCNC: NEGATIVE — SIGNIFICANT CHANGE UP
BLOOD GAS VENOUS COMPREHENSIVE RESULT: SIGNIFICANT CHANGE UP
BLOOD GAS VENOUS COMPREHENSIVE RESULT: SIGNIFICANT CHANGE UP
BORDETELLA PARAPERTUSSIS (RAPRVP): SIGNIFICANT CHANGE UP
BUN SERPL-MCNC: 14 MG/DL — SIGNIFICANT CHANGE UP (ref 7–23)
C PNEUM DNA SPEC QL NAA+PROBE: SIGNIFICANT CHANGE UP
CALCIUM SERPL-MCNC: 9.4 MG/DL — SIGNIFICANT CHANGE UP (ref 8.4–10.5)
CHLORIDE BLDV-SCNC: 111 MMOL/L — HIGH (ref 96–108)
CHLORIDE SERPL-SCNC: 95 MMOL/L — LOW (ref 98–107)
CO2 BLDV-SCNC: 24 MMOL/L — SIGNIFICANT CHANGE UP (ref 22–26)
CO2 SERPL-SCNC: 25 MMOL/L — SIGNIFICANT CHANGE UP (ref 22–31)
COLOR SPEC: YELLOW — SIGNIFICANT CHANGE UP
CREAT SERPL-MCNC: 0.55 MG/DL — SIGNIFICANT CHANGE UP (ref 0.5–1.3)
DIFF PNL FLD: NEGATIVE — SIGNIFICANT CHANGE UP
EOSINOPHIL # BLD AUTO: 0 K/UL — SIGNIFICANT CHANGE UP (ref 0–0.5)
EOSINOPHIL NFR BLD AUTO: 0 % — SIGNIFICANT CHANGE UP (ref 0–6)
FLUAV SUBTYP SPEC NAA+PROBE: SIGNIFICANT CHANGE UP
FLUBV RNA SPEC QL NAA+PROBE: SIGNIFICANT CHANGE UP
GAS PNL BLDV: 134 MMOL/L — LOW (ref 136–145)
GLUCOSE BLDV-MCNC: 91 MG/DL — SIGNIFICANT CHANGE UP (ref 70–99)
GLUCOSE SERPL-MCNC: 130 MG/DL — HIGH (ref 70–99)
GLUCOSE UR QL: NEGATIVE — SIGNIFICANT CHANGE UP
HADV DNA SPEC QL NAA+PROBE: SIGNIFICANT CHANGE UP
HCO3 BLDV-SCNC: 23 MMOL/L — SIGNIFICANT CHANGE UP (ref 22–29)
HCOV 229E RNA SPEC QL NAA+PROBE: SIGNIFICANT CHANGE UP
HCOV HKU1 RNA SPEC QL NAA+PROBE: SIGNIFICANT CHANGE UP
HCOV NL63 RNA SPEC QL NAA+PROBE: SIGNIFICANT CHANGE UP
HCOV OC43 RNA SPEC QL NAA+PROBE: SIGNIFICANT CHANGE UP
HCT VFR BLD CALC: 41.9 % — SIGNIFICANT CHANGE UP (ref 39–50)
HCT VFR BLDA CALC: 36 % — LOW (ref 39–51)
HGB BLD CALC-MCNC: 12.1 G/DL — LOW (ref 13–17)
HGB BLD-MCNC: 14.6 G/DL — SIGNIFICANT CHANGE UP (ref 13–17)
HMPV RNA SPEC QL NAA+PROBE: SIGNIFICANT CHANGE UP
HPIV1 RNA SPEC QL NAA+PROBE: SIGNIFICANT CHANGE UP
HPIV2 RNA SPEC QL NAA+PROBE: SIGNIFICANT CHANGE UP
HPIV3 RNA SPEC QL NAA+PROBE: SIGNIFICANT CHANGE UP
HPIV4 RNA SPEC QL NAA+PROBE: SIGNIFICANT CHANGE UP
IANC: 10.16 K/UL — HIGH (ref 1.5–8.5)
IMM GRANULOCYTES NFR BLD AUTO: 0.2 % — SIGNIFICANT CHANGE UP (ref 0–1.5)
KETONES UR-MCNC: NEGATIVE — SIGNIFICANT CHANGE UP
LACTATE BLDV-MCNC: 1.2 MMOL/L — SIGNIFICANT CHANGE UP (ref 0.5–2)
LEUKOCYTE ESTERASE UR-ACNC: NEGATIVE — SIGNIFICANT CHANGE UP
LIDOCAIN IGE QN: 16 U/L — SIGNIFICANT CHANGE UP (ref 7–60)
LYMPHOCYTES # BLD AUTO: 0.61 K/UL — LOW (ref 1–3.3)
LYMPHOCYTES # BLD AUTO: 5.5 % — LOW (ref 13–44)
M PNEUMO DNA SPEC QL NAA+PROBE: SIGNIFICANT CHANGE UP
MAGNESIUM SERPL-MCNC: 2 MG/DL — SIGNIFICANT CHANGE UP (ref 1.6–2.6)
MCHC RBC-ENTMCNC: 31.5 PG — SIGNIFICANT CHANGE UP (ref 27–34)
MCHC RBC-ENTMCNC: 34.8 GM/DL — SIGNIFICANT CHANGE UP (ref 32–36)
MCV RBC AUTO: 90.3 FL — SIGNIFICANT CHANGE UP (ref 80–100)
MONOCYTES # BLD AUTO: 0.25 K/UL — SIGNIFICANT CHANGE UP (ref 0–0.9)
MONOCYTES NFR BLD AUTO: 2.3 % — SIGNIFICANT CHANGE UP (ref 2–14)
NEUTROPHILS # BLD AUTO: 10.16 K/UL — HIGH (ref 1.8–7.4)
NEUTROPHILS NFR BLD AUTO: 91.9 % — HIGH (ref 43–77)
NITRITE UR-MCNC: NEGATIVE — SIGNIFICANT CHANGE UP
NRBC # BLD: 0 /100 WBCS — SIGNIFICANT CHANGE UP
NRBC # FLD: 0 K/UL — SIGNIFICANT CHANGE UP
PCO2 BLDV: 42 MMHG — SIGNIFICANT CHANGE UP (ref 42–55)
PH BLDV: 7.34 — SIGNIFICANT CHANGE UP (ref 7.32–7.43)
PH UR: 6.5 — SIGNIFICANT CHANGE UP (ref 5–8)
PLATELET # BLD AUTO: 155 K/UL — SIGNIFICANT CHANGE UP (ref 150–400)
PO2 BLDV: 64 MMHG — SIGNIFICANT CHANGE UP
POTASSIUM BLDV-SCNC: 3.5 MMOL/L — SIGNIFICANT CHANGE UP (ref 3.5–5.1)
POTASSIUM SERPL-MCNC: 4 MMOL/L — SIGNIFICANT CHANGE UP (ref 3.5–5.3)
POTASSIUM SERPL-SCNC: 4 MMOL/L — SIGNIFICANT CHANGE UP (ref 3.5–5.3)
PROT SERPL-MCNC: 7.2 G/DL — SIGNIFICANT CHANGE UP (ref 6–8.3)
PROT UR-MCNC: ABNORMAL
RAPID RVP RESULT: SIGNIFICANT CHANGE UP
RBC # BLD: 4.64 M/UL — SIGNIFICANT CHANGE UP (ref 4.2–5.8)
RBC # FLD: 13.2 % — SIGNIFICANT CHANGE UP (ref 10.3–14.5)
RBC CASTS # UR COMP ASSIST: SIGNIFICANT CHANGE UP /HPF (ref 0–4)
RSV RNA SPEC QL NAA+PROBE: SIGNIFICANT CHANGE UP
RV+EV RNA SPEC QL NAA+PROBE: SIGNIFICANT CHANGE UP
SAO2 % BLDV: 93.1 % — SIGNIFICANT CHANGE UP
SARS-COV-2 RNA SPEC QL NAA+PROBE: SIGNIFICANT CHANGE UP
SODIUM SERPL-SCNC: 132 MMOL/L — LOW (ref 135–145)
SP GR SPEC: 1.02 — SIGNIFICANT CHANGE UP (ref 1–1.05)
TROPONIN T, HIGH SENSITIVITY RESULT: 12 NG/L — SIGNIFICANT CHANGE UP
TROPONIN T, HIGH SENSITIVITY RESULT: 15 NG/L — SIGNIFICANT CHANGE UP
UROBILINOGEN FLD QL: SIGNIFICANT CHANGE UP
WBC # BLD: 11.05 K/UL — HIGH (ref 3.8–10.5)
WBC # FLD AUTO: 11.05 K/UL — HIGH (ref 3.8–10.5)
WBC UR QL: SIGNIFICANT CHANGE UP /HPF (ref 0–5)

## 2021-10-14 PROCEDURE — 99223 1ST HOSP IP/OBS HIGH 75: CPT | Mod: GC

## 2021-10-14 PROCEDURE — 71045 X-RAY EXAM CHEST 1 VIEW: CPT | Mod: 26

## 2021-10-14 PROCEDURE — 99285 EMERGENCY DEPT VISIT HI MDM: CPT

## 2021-10-14 PROCEDURE — 74177 CT ABD & PELVIS W/CONTRAST: CPT | Mod: 26

## 2021-10-14 RX ORDER — SODIUM CHLORIDE 9 MG/ML
1000 INJECTION INTRAMUSCULAR; INTRAVENOUS; SUBCUTANEOUS ONCE
Refills: 0 | Status: COMPLETED | OUTPATIENT
Start: 2021-10-14 | End: 2021-10-14

## 2021-10-14 RX ORDER — MIDODRINE HYDROCHLORIDE 2.5 MG/1
10 TABLET ORAL EVERY 8 HOURS
Refills: 0 | Status: DISCONTINUED | OUTPATIENT
Start: 2021-10-14 | End: 2021-10-15

## 2021-10-14 RX ORDER — SODIUM CHLORIDE 9 MG/ML
1000 INJECTION, SOLUTION INTRAVENOUS
Refills: 0 | Status: DISCONTINUED | OUTPATIENT
Start: 2021-10-14 | End: 2021-10-15

## 2021-10-14 RX ORDER — MIDODRINE HYDROCHLORIDE 2.5 MG/1
10 TABLET ORAL ONCE
Refills: 0 | Status: COMPLETED | OUTPATIENT
Start: 2021-10-14 | End: 2021-10-14

## 2021-10-14 RX ORDER — VANCOMYCIN HCL 1 G
1000 VIAL (EA) INTRAVENOUS ONCE
Refills: 0 | Status: COMPLETED | OUTPATIENT
Start: 2021-10-14 | End: 2021-10-14

## 2021-10-14 RX ORDER — HALOPERIDOL DECANOATE 100 MG/ML
2 INJECTION INTRAMUSCULAR EVERY 12 HOURS
Refills: 0 | Status: DISCONTINUED | OUTPATIENT
Start: 2021-10-14 | End: 2021-10-21

## 2021-10-14 RX ORDER — SODIUM CHLORIDE 9 MG/ML
500 INJECTION INTRAMUSCULAR; INTRAVENOUS; SUBCUTANEOUS ONCE
Refills: 0 | Status: COMPLETED | OUTPATIENT
Start: 2021-10-14 | End: 2021-10-14

## 2021-10-14 RX ORDER — NOREPINEPHRINE BITARTRATE/D5W 8 MG/250ML
0.05 PLASTIC BAG, INJECTION (ML) INTRAVENOUS
Qty: 8 | Refills: 0 | Status: DISCONTINUED | OUTPATIENT
Start: 2021-10-14 | End: 2021-10-14

## 2021-10-14 RX ORDER — PIPERACILLIN AND TAZOBACTAM 4; .5 G/20ML; G/20ML
3.38 INJECTION, POWDER, LYOPHILIZED, FOR SOLUTION INTRAVENOUS EVERY 8 HOURS
Refills: 0 | Status: DISCONTINUED | OUTPATIENT
Start: 2021-10-14 | End: 2021-10-21

## 2021-10-14 RX ORDER — ACETAMINOPHEN 500 MG
1000 TABLET ORAL ONCE
Refills: 0 | Status: COMPLETED | OUTPATIENT
Start: 2021-10-14 | End: 2021-10-14

## 2021-10-14 RX ORDER — MIRABEGRON 50 MG/1
1 TABLET, EXTENDED RELEASE ORAL
Qty: 0 | Refills: 0 | DISCHARGE

## 2021-10-14 RX ORDER — LEVETIRACETAM 250 MG/1
500 TABLET, FILM COATED ORAL
Refills: 0 | Status: DISCONTINUED | OUTPATIENT
Start: 2021-10-14 | End: 2021-11-10

## 2021-10-14 RX ORDER — HALOPERIDOL DECANOATE 100 MG/ML
4 INJECTION INTRAMUSCULAR AT BEDTIME
Refills: 0 | Status: DISCONTINUED | OUTPATIENT
Start: 2021-10-14 | End: 2021-10-14

## 2021-10-14 RX ORDER — PIPERACILLIN AND TAZOBACTAM 4; .5 G/20ML; G/20ML
3.38 INJECTION, POWDER, LYOPHILIZED, FOR SOLUTION INTRAVENOUS ONCE
Refills: 0 | Status: COMPLETED | OUTPATIENT
Start: 2021-10-14 | End: 2021-10-14

## 2021-10-14 RX ORDER — ONDANSETRON 8 MG/1
4 TABLET, FILM COATED ORAL EVERY 8 HOURS
Refills: 0 | Status: DISCONTINUED | OUTPATIENT
Start: 2021-10-14 | End: 2021-11-10

## 2021-10-14 RX ORDER — POLYETHYLENE GLYCOL 3350 17 G/17G
1 POWDER, FOR SOLUTION ORAL
Qty: 0 | Refills: 0 | DISCHARGE

## 2021-10-14 RX ORDER — HALOPERIDOL DECANOATE 100 MG/ML
2 INJECTION INTRAMUSCULAR
Refills: 0 | Status: DISCONTINUED | OUTPATIENT
Start: 2021-10-14 | End: 2021-10-14

## 2021-10-14 RX ORDER — ENOXAPARIN SODIUM 100 MG/ML
40 INJECTION SUBCUTANEOUS DAILY
Refills: 0 | Status: DISCONTINUED | OUTPATIENT
Start: 2021-10-14 | End: 2021-10-26

## 2021-10-14 RX ORDER — POLYETHYLENE GLYCOL 3350 17 G/17G
17 POWDER, FOR SOLUTION ORAL
Refills: 0 | Status: DISCONTINUED | OUTPATIENT
Start: 2021-10-14 | End: 2021-11-10

## 2021-10-14 RX ADMIN — SODIUM CHLORIDE 1000 MILLILITER(S): 9 INJECTION INTRAMUSCULAR; INTRAVENOUS; SUBCUTANEOUS at 04:30

## 2021-10-14 RX ADMIN — Medication 0.05 MICROGRAM(S)/KG/MIN: at 12:15

## 2021-10-14 RX ADMIN — Medication 4.69 MICROGRAM(S)/KG/MIN: at 09:24

## 2021-10-14 RX ADMIN — SODIUM CHLORIDE 75 MILLILITER(S): 9 INJECTION, SOLUTION INTRAVENOUS at 22:16

## 2021-10-14 RX ADMIN — MIDODRINE HYDROCHLORIDE 10 MILLIGRAM(S): 2.5 TABLET ORAL at 22:16

## 2021-10-14 RX ADMIN — SODIUM CHLORIDE 1000 MILLILITER(S): 9 INJECTION INTRAMUSCULAR; INTRAVENOUS; SUBCUTANEOUS at 08:00

## 2021-10-14 RX ADMIN — Medication 400 MILLIGRAM(S): at 02:04

## 2021-10-14 RX ADMIN — PIPERACILLIN AND TAZOBACTAM 25 GRAM(S): 4; .5 INJECTION, POWDER, LYOPHILIZED, FOR SOLUTION INTRAVENOUS at 19:10

## 2021-10-14 RX ADMIN — Medication 4.69 MICROGRAM(S)/KG/MIN: at 09:26

## 2021-10-14 RX ADMIN — PIPERACILLIN AND TAZOBACTAM 200 GRAM(S): 4; .5 INJECTION, POWDER, LYOPHILIZED, FOR SOLUTION INTRAVENOUS at 01:29

## 2021-10-14 RX ADMIN — Medication 250 MILLIGRAM(S): at 02:31

## 2021-10-14 RX ADMIN — Medication 400 MILLIGRAM(S): at 13:13

## 2021-10-14 RX ADMIN — SODIUM CHLORIDE 1000 MILLILITER(S): 9 INJECTION INTRAMUSCULAR; INTRAVENOUS; SUBCUTANEOUS at 01:29

## 2021-10-14 RX ADMIN — SODIUM CHLORIDE 500 MILLILITER(S): 9 INJECTION INTRAMUSCULAR; INTRAVENOUS; SUBCUTANEOUS at 08:27

## 2021-10-14 RX ADMIN — SODIUM CHLORIDE 500 MILLILITER(S): 9 INJECTION INTRAMUSCULAR; INTRAVENOUS; SUBCUTANEOUS at 09:27

## 2021-10-14 RX ADMIN — MIDODRINE HYDROCHLORIDE 10 MILLIGRAM(S): 2.5 TABLET ORAL at 12:18

## 2021-10-14 RX ADMIN — SODIUM CHLORIDE 500 MILLILITER(S): 9 INJECTION INTRAMUSCULAR; INTRAVENOUS; SUBCUTANEOUS at 07:55

## 2021-10-14 RX ADMIN — MIDODRINE HYDROCHLORIDE 10 MILLIGRAM(S): 2.5 TABLET ORAL at 13:48

## 2021-10-14 NOTE — ED PROVIDER NOTE - ATTENDING CONTRIBUTION TO CARE
DR. YANG, ATTENDING MD-  I performed a face to face bedside interview with the patient regarding history of present illness, review of symptoms and past medical history. I completed an independent physical exam.  I have discussed the patient's plan of care with the resident.   Documentation as above in the note.    54 y/o male h/o autism, seizures bib snf staff for n/v cough low o2.  Has had asp pna in past.  Hypoxic here in ED.  Eval for pna, viral syndrome, sbo, lbo.  Obtain cbc cmp vbg blood cx x2 covid swab cxr ct a/p give ivf abx will need admission for further care and evaluation.

## 2021-10-14 NOTE — ED PROVIDER NOTE - OBJECTIVE STATEMENT
55M PMH Autism, with G tube/Colostomy bag, presents to the ED with low oxygen sats after pt was noted to be vomiting up G tube feed an hour prior to arrival, staff at home concerned for aspiration. Pt presents moaning, staff accompanying pt states when pt moans it typically means he's uncomfortable, otherwise pt at baseline mental status, not verbal.

## 2021-10-14 NOTE — ED PROVIDER NOTE - PHYSICAL EXAMINATION
GENERAL: chronically ill appearing, appears uncomfortable  HEENT: normal conjunctiva, oral mucosa moist  CARDIAC: tachy rate, sinus rhythm  PULM: tachypnic to high 20s, sats 80s on RA improved to mid 90s on 4L NC  GI: abdomen nondistended, soft, G tube/colostomy bag in place  NEURO: pt with autism not verbal, moving all extremities without lateralization  MSK: cachechtic  SKIN: no visible acute rashes

## 2021-10-14 NOTE — H&P ADULT - PROBLEM SELECTOR PLAN 2
Per d/w group home nurse, patient has remote history of seizures, now well controlled  - continue keppra Per d/w group home nurse, patient has remote history of seizures, now well controlled  - continue keppra thru G tube

## 2021-10-14 NOTE — ED ADULT NURSE REASSESSMENT NOTE - NS ED NURSE REASSESS COMMENT FT1
Patient received in room 4. Patient is non verbal unable to communicate needs. Aide is at bedside. Patient is receiving two bolus of NS, hypotensive. Resident/ Attending made aware of VS at 0835. MD Nino currently at bedside for assessment. Will continue to monitor.

## 2021-10-14 NOTE — ED ADULT NURSE REASSESSMENT NOTE - NS ED NURSE REASSESS COMMENT FT1
Patient being monitored of BP after d//c off levophed. Patient is in bed, patient cleaned and positioned to comfort.

## 2021-10-14 NOTE — H&P ADULT - NSHPPHYSICALEXAM_GEN_ALL_CORE
PHYSICAL EXAM:  GENERAL: Sitting comfortable in bed, in no acute distress  HENMT: Atraumatic, moist mucous membranes, no oropharyngeal exudates or vesicles, uvula is midline EYES: Clear bilaterally, PERRL, EOMs intact b/l  HEART: RRR, S1/S2, no murmur/gallops/rubs  RESPIRATORY: Clear to auscultation bilaterally, no wheezes/rhonchi/rales  ABDOMEN: +BS, soft, nontender, nondistended  EXTREMITIES: No lower extremity edema, +2 radial pulses b/l  NEURO:  A&Ox4, no focal motor deficits or sensory deficits   Heme/LYMPH: No ecchymosis or bruising, no anterior/posterior cervical or supraclavicular LAD  SKIN:  Skin normal color for race, warm, dry and intact. No evidence of rash. PHYSICAL EXAM:  GENERAL: Sitting comfortable in bed, in no acute distress, coughing  HENMT: Atraumatic, moist mucous membranes, no oropharyngeal exudates or vesicles, uvula is midline   EYES: Clear bilaterally, PERRL  HEART: RRR, S1/S2, no murmur/gallops/rubs  RESPIRATORY: Clear to auscultation bilaterally, no wheezes/rhonchi/rales, +3L NC, + cough   ABDOMEN: +BS, soft, nontender, nondistended, g tube in place, colostomy bag in place.  EXTREMITIES: No lower extremity edema, +2 radial pulses b/l, 1+ posterior tibial pulses  NEURO:  alert, non verbal, doesn't not FC  Heme/LYMPH: No ecchymosis or bruising, no anterior/posterior cervical or supraclavicular LAD  SKIN:  Skin normal color for race, warm, dry and intact. No evidence of rash. PHYSICAL EXAM:  GENERAL: Sitting comfortable in bed, in no acute distress, coughing  HENMT: Atraumatic, moist mucous membranes, no oropharyngeal exudates or vesicles, uvula is midline   EYES: Clear bilaterally, PERRL  HEART: RRR, S1/S2, no murmur/gallops/rubs  RESPIRATORY: Clear to auscultation bilaterally, no wheezes/rhonchi/rales, +5L NC, + cough   ABDOMEN: +BS, soft, nontender, nondistended, g tube in place, colostomy bag in place.  EXTREMITIES: No lower extremity edema, +2 radial pulses b/l, 1+ posterior tibial pulses  NEURO:  alert, non verbal, doesn't not FC, tracks eyes to movement  Heme/LYMPH: No ecchymosis or bruising, no anterior/posterior cervical or supraclavicular LAD  SKIN:  Skin normal color for race, warm, dry and intact. No evidence of rash. Vital Signs Last 24 Hrs  T(C): 37.3 (14 Oct 2021 18:02), Max: 38.7 (14 Oct 2021 01:15)  T(F): 99.1 (14 Oct 2021 18:02), Max: 101.7 (14 Oct 2021 01:15)  HR: 90 (14 Oct 2021 19:14) (59 - 117)  BP: 82/58 (14 Oct 2021 19:14) (71/51 - 116/66)  BP(mean): 65 (14 Oct 2021 19:14) (58 - 81)  RR: 18 (14 Oct 2021 19:14) (17 - 28)  SpO2: 99% (14 Oct 2021 19:14) (85% - 100%)    PHYSICAL EXAM:  GENERAL: laying in bed, in no acute distress, coughing  HENMT: Atraumatic, moist mucous membranes, no oropharyngeal exudates or vesicles, uvula is midline   EYES: Clear bilaterally, PERRL  HEART: RRR, S1/S2, no murmur/gallops/rubs  RESPIRATORY: Course breath sounds diffusely, +5L NC, + cough   ABDOMEN: +BS, soft, nontender, nondistended, g tube in place, colostomy bag in place.  EXTREMITIES: No lower extremity edema, +2 radial pulses b/l, 1+ posterior tibial pulses  NEURO:  alert, non verbal, doesn't not FC, tracks eyes to movement  Heme/LYMPH: No ecchymosis or bruising, no anterior/posterior cervical or supraclavicular LAD  SKIN:  Skin normal color for race, warm, dry and intact. No evidence of rash.

## 2021-10-14 NOTE — H&P ADULT - ASSESSMENT
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

## 2021-10-14 NOTE — H&P ADULT - HISTORY OF PRESENT ILLNESS
Danie Olivera is a 55M with history of seizures, autism with intellectual developmenal developmental delay (nonverbal at baseline), sigmoid volvulus (1/2021 s/p ex-lap and ostomy who was brought in from his group home with low oxygen after vomiting G-tube feed, and moaning, concern for aspiration. Per discussion, with [facility] group home ([phone]), patient has been coughing up phlegm since his g-tube placement in June of this year. Since then has had multiple hospitalizations with ED visits    Since midnight last night, he has     On arrival in the ED, he was febrile to 101.7, , /66, RR 22, satting 85% on RA.      Danie Olivera is a 55M with history of autism with intellectual developmenal developmental delay (nonverbal at baseline), remote history of seizures (not on seizure meds) sigmoid volvulus (1/2021 s/p ex-lap and ostomy), g-tube placement in 6/2021 who was brought in from his group home with low oxygen after vomiting G-tube feed, and moaning, concern for aspiration. Per discussion, with [facility] group home ([phone]), patient has been coughing up phlegm since his g-tube placement in June of this year. Since then has had multiple ED visits for coughing and concern for pneumonia, found to have clear CXR and afebrile.     At midnight the night before presentation, he was observed to be regurgitating g-tube formula, with coughing and concern for aspiration. This has never happened in the past, despite coughing more frequently. The feeds were paused and EMS was     On arrival in the ED, he was febrile to 101.7, , /66, RR 22, satting 85% on RA.      Danie Olivera is a 55M with history of autism with intellectual developmenal developmental delay (nonverbal at baseline), remote history of seizures (not on seizure meds) sigmoid volvulus (1/2021 s/p ex-lap and ostomy), g-tube placement in 6/2021 who was brought in from his group home with low oxygen after vomiting G-tube feed, and moaning, concern for aspiration. Per discussion, with [facility] group home ([phone]), patient has been coughing up phlegm since his g-tube placement in June of this year. Since then has had multiple ED visits for coughing and concern for pneumonia, found to have clear CXR and afebrile.     At midnight the night before presentation, he was observed to be regurgitating g-tube formula, with coughing and concern for aspiration. This has never happened in the past, despite coughing more frequently. The feeds were paused and patient brought to ED for evaluation. No fevers or chills. No abdominal pain, no constipation.    On arrival in the ED, he was febrile to 101.7, , /66, RR 22, satting 85% on RA. Blood pressure decreased to 78/53, fluid resuscitated with 4L and started on levophed. CXR with MICU consulted, not a MICU candidate. midodrine 20 mg     Danie Olivera is a 55M with history of autism with intellectual developmenal developmental delay (nonverbal at baseline), remote history of seizures (on keppra), sigmoid volvulus (1/2021 s/p ex-lap and ostomy), g-tube placement in 6/2021 who was brought in from his group home with low oxygen after vomiting G-tube feed, and moaning, concern for aspiration. Per discussion, with [facility] group home ([phone]), patient has been coughing up phlegm since his g-tube placement in June of this year. Since then has had multiple ED visits for coughing and concern for pneumonia, found to have clear CXR and afebrile.     At midnight the night before presentation, he was observed to be regurgitating g-tube formula, with coughing and concern for aspiration. This has never happened in the past, despite coughing more frequently. The feeds were paused and patient brought to ED for evaluation. No fevers or chills. No abdominal pain, no constipation.    On arrival in the ED, he was febrile to 101.7, , /66, RR 22, satting 85% on RA. Blood pressure decreased to 78/53, fluid resuscitated with 4L and started on levophed. WBC with 11.05 with neutrophilic predominance. CXR with right lung airspace opacity. CTAP with RLL consolidation and patchy airspace opacity in RML. MICU consulted, not a MICU candidate.  Gave midodrine 20 mg, with appropriate reponse    He was admitted to Medicine for evaluation and management of septic shock. Danie Olivera is a 55M with history of autism with intellectual developmenal developmental delay (nonverbal at baseline), remote history of seizures (on keppra), sigmoid volvulus (1/2021 s/p ex-lap and ostomy), g-tube placement in 6/2021 who was brought in from his group home with low oxygen after vomiting G-tube feed, and moaning, concern for aspiration. Per discussion, with [facility] group home ([phone]), patient has been coughing up phlegm since his g-tube placement in June of this year. Since then has had multiple ED visits for coughing and concern for pneumonia, found to have clear CXR and afebrile.     At midnight the night before presentation, he was observed to be regurgitating g-tube formula, with coughing and concern for aspiration. This has never happened in the past, despite coughing more frequently. The feeds were paused and patient brought to ED for evaluation. No fevers or chills. No abdominal pain, no constipation.    On arrival in the ED, he was febrile to 101.7, , /66, RR 22, satting 85% on RA. Blood pressure decreased to 78/53, fluid resuscitated with 4L and started on levophed. WBC with 11.05 with neutrophilic predominance. CXR with right lung airspace opacity. CTAP with RLL consolidation and patchy airspace opacity in RML. MICU consulted, not a MICU candidate.  Gave midodrine 20 mg, with appropriate reponse    He was admitted to Medicine for evaluation and management of septic shock due to aspiration PNA

## 2021-10-14 NOTE — H&P ADULT - NSHPLABSRESULTS_GEN_ALL_CORE
LABS:                         14.6   11.05 )-----------( 155      ( 14 Oct 2021 02:20 )             41.9     10-14    132<L>  |  95<L>  |  14  ----------------------------<  130<H>  4.0   |  25  |  0.55    Ca    9.4      14 Oct 2021 02:20  Mg     2.00     10-14    TPro  7.2  /  Alb  3.8  /  TBili  0.5  /  DBili  x   /  AST  20  /  ALT  17  /  AlkPhos  84  10-14      Urinalysis Basic - ( 14 Oct 2021 02:20 )    Color: Yellow / Appearance: Slightly Turbid / S.024 / pH: x  Gluc: x / Ketone: Negative  / Bili: Negative / Urobili: <2 mg/dL   Blood: x / Protein: 30 mg/dL / Nitrite: Negative   Leuk Esterase: Negative / RBC: 0-2 /HPF / WBC 0-2 /HPF   Sq Epi: x / Non Sq Epi: x / Bacteria: Negative            RADIOLOGY, EKG & ADDITIONAL TESTS: Reviewed.

## 2021-10-14 NOTE — ED ADULT NURSE NOTE - OBJECTIVE STATEMENT
Facilitating RN-55 year olf male from group home, pt with autism, seizure disorder, kidney stone, colostomy, G-tube coming in for low oxygen vomiting from g-tube and moaning. As per staff, pt may have aspirated. AS per staff no fever, pt unable to verbalize other symptoms.  20g placed in right hand, 20g in right forearm. Sinus Tach on cardiac monitor. Pt straight cath for urine. redness in sacrum. g-tube in place.  Pt medicated, primary RN updated. Facilitating RN-55 year olf male from group home, pt with autism, seizure disorder, kidney stone, colostomy, G-tube coming in for low oxygen vomiting from g-tube and moaning. As per staff, pt may have aspirated. AS per staff no fever, pt unable to verbalize other symptoms. Pt moaning, pt suctioned orally, thick white sputum noted,  20g placed in right hand, 20g in right forearm. Sinus Tach on cardiac monitor. Pt straight cath for urine. redness in sacrum. g-tube in place.  Pt medicated, primary RN updated.

## 2021-10-14 NOTE — H&P ADULT - NSHPREVIEWOFSYSTEMS_GEN_ALL_CORE
Constitutional: no fever and no chills  Eyes: no discharge, no irritation, no pain, no visual changes  ENMT: no ear pain or hearing loss, no dysphagia or throat pain  Neck: no pain, no stiffness, no swollen glands  CV: no chest pain, no palpitations, no edema  Resp: no cough, no shortness of breath  Abd: no abdominal pain, no nausea or vomiting, no diarrhea  : no dysuria, no hematuria  MSK: no back pain, no neck pain, no joint pain  Neuro: no LOC, no gait abnormality, no headache, no sensory deficits, no weakness  Skin: no rashes, no lacerations, no lesions Constitutional: no fever and no chills  Eyes: no discharge, no irritation, no pain, no visual changes  ENMT: no ear pain or hearing loss, no dysphagia or throat pain  Neck: no pain, no stiffness, no swollen glands  CV: no chest pain, no edema  Resp: +cough, no shortness of breath  Abd: no abdominal pain, no nausea +vomiting, no diarrhea  : no dysuria, no hematuria  MSK: no back pain, no neck pain, no joint pain  Neuro: no LOC, no headache, no sensory deficits, no weakness  Skin: no rashes, no lacerations, no lesions Unable to assess due to patient's mental status

## 2021-10-14 NOTE — ED ADULT TRIAGE NOTE - CHIEF COMPLAINT QUOTE
patient presents from group home for possible aspiration. as per staff member patient "cough up G-tube contents".  SPO2 85 % on RA. patient tachypneic. PMH seizure disorder autism cerebral palsy. charge nurse aware

## 2021-10-14 NOTE — H&P ADULT - PROBLEM SELECTOR PLAN 7
- continue home haloperidol   - haloperidol 2 mg/ml (1ml AM, 2 ml PM) thru G tube - hold home haloperidol 2 mg/ml (1ml AM, 2 ml PM) thru G tube  - PRN haldol 2 mg/ml 1 ml q12 PRN

## 2021-10-14 NOTE — ED PROVIDER NOTE - CLINICAL SUMMARY MEDICAL DECISION MAKING FREE TEXT BOX
Concern for asp pneumonitis/pna, will w/u other causes for pts n/v, will do cultures, labs, CT, zofran, fluids, CXR, abx. TBA hypoxia Concern for asp pneumonitis/pna and possible sbo as cause of pts vomiting, will w/u other causes for pts n/v, will do cultures, labs, CT, zofran, fluids, CXR, abx. TBA hypoxia

## 2021-10-14 NOTE — H&P ADULT - PROBLEM SELECTOR PLAN 1
- SIRS+: fever, leukocytosis, tachycardia, tachypnea, on 5L oxygen, CTAP with RLL consolidation s/p aspiration event. Hypotensive MAP <65 requiring levophed, now weaned. received fluid resuscitation and 20 mg midodrine in ED with appropriate response  - POCUS in ED with a-line predominance in anterior lung fields bilaterally. hepatization of right lung, B lines at right lower hemithorax, with air bronchograms. No pleural effusion appreciated  - received vanc and zosyn in ED  - continue zosyn  - blood cultures and ucx  - vitals and pulse ox q4 Yes - SIRS+: fever, leukocytosis, tachycardia, tachypnea, on 5L oxygen, CTAP with RLL consolidation s/p aspiration event. Hypotensive MAP <65 requiring levophed, now weaned. received fluid resuscitation and 20 mg midodrine in ED with appropriate response  - POCUS in ED with a-line predominance in anterior lung fields bilaterally. hepatization of right lung, B lines at right lower hemithorax, with air bronchograms. No pleural effusion appreciated  - received vanc and zosyn in ED  - continue zosyn  - midodrine 10 q8  - can give 10 mg midodrine x1 if SBP <90  - IVF  - nutrition consult given aspiration event and optimizing tube feeds  - maintain MAP >65  - NPO  - blood cultures and ucx  - vitals and pulse ox q4 - SIRS+: fever, leukocytosis, tachycardia, tachypnea, on 5L oxygen, CTAP with RLL consolidation s/p aspiration event. Hypotensive MAP <65 requiring levophed, now weaned. received fluid resuscitation and 20 mg midodrine in ED with appropriate response  - POCUS in ED with a-line predominance in anterior lung fields bilaterally. hepatization of right lung, B lines at right lower hemithorax, with air bronchograms. No pleural effusion appreciated  - received vanc and zosyn in ED  - MICU consulted, not a candidate at this time  - continue zosyn  - midodrine 10 q8  - can give 10 mg midodrine x1 if SBP <90. per MICU, can go up to 30 mg midodrine TID  - IVF  - nutrition consult given aspiration event and optimizing tube feeds  - maintain MAP >65  - NPO  - blood cultures and ucx  - vitals and pulse ox q4

## 2021-10-14 NOTE — ED ADULT NURSE REASSESSMENT NOTE - NS ED NURSE REASSESS COMMENT FT1
MHX - autism. nonverbal from question. only open eyes. has G-tube and colostomy bag on the abdomen. extremity contracture. iv #20g on rt hand, #20g on rt forearm. keep being placed cardiac monitor - hr 106, spo2 97% with oxygen at 4l/min.

## 2021-10-14 NOTE — H&P ADULT - ATTENDING COMMENTS
55M with history of seizures, autism with intellectual developmenal developmental delay (nonverbal at baseline), s/p G-tube, sigmoid volvulus (1/2021 s/p ex-lap and ostomy) admitted with septic shock 2/2 aspiration PNA. Patient briefly on pressors in ED, seen by MICU and not a candidate. Start on midodrine for BP support.     Patient non-verbal, unable to participate in HPI, no aide at bedside.   On exam Tmax 101.7F, HR 110s, BP 70/50s to 100s/60. Patient w/ course upper airway sounds, mild tachypnea and course breath sounds, worse on R side.   Labs reviewed  Imaging reviewed. CXR and CT A/P notable for RLL consolidation and trace R pleural effusion.     #Septic shock 2/2 aspiration PNA  - Patient meets sepsis criteria w/ fever, tachycardia. Briefly required levophed gtt, now off. Received 4L IVF total  - Patient w/ reportedly recurrent aspiration after G-tube place. RLL opacty consistent w/ likely aspiration event.   - Will c/w Zosyn and maintence IVF for now   - F/u blood cultures   - Can do midodrine 10mg q8h for now and will wean as tolerated   - Monitor BP q4h     Discussed with HS 55M with history of seizures, autism with intellectual developmenal developmental delay (nonverbal at baseline), s/p G-tube, sigmoid volvulus (1/2021 s/p ex-lap and ostomy) admitted with septic shock 2/2 aspiration PNA. Patient briefly on pressors in ED, seen by MICU and not a candidate. Start on midodrine for BP support.     Patient non-verbal, unable to participate in HPI, no aide at bedside.   On exam Tmax 101.7F, HR 110s, BP 70/50s to 100s/60. Patient w/ course upper airway sounds, mild tachypnea and course breath sounds, worse on R side.   Labs reviewed  Imaging reviewed. CXR and CT A/P notable for RLL consolidation and trace R pleural effusion.     #Septic shock 2/2 aspiration PNA  - Patient meets sepsis criteria w/ fever, tachycardia. Briefly required levophed gtt, now off. Received 4L IVF total  - Patient w/ reportedly recurrent aspiration after G-tube place. RLL opacty consistent w/ likely aspiration event.   - Will c/w Zosyn and maintence IVF for now   - F/u blood cultures   - Can do midodrine 10mg q8h for now and will wean as tolerated   - Monitor BP q4h     Discussed with HS3 Dr. Chow

## 2021-10-14 NOTE — ED ADULT NURSE REASSESSMENT NOTE - NS ED NURSE REASSESS COMMENT FT1
completed abd CAT scan. pt had moaning and cough sometimes. fever 97.9 rectally. sheet voiding done. changed sheet and perineal care done. keep elevated the head of the bed. closed monitored pt's bp completed abd CAT scan. pt had moaning and cough sometimes. fever 97.9 rectally. sheet voiding done. changed sheet and perineal care done. keep elevated the head of the bed. bp 86/55 MD xavier made aware. closed monitored pt's bp. staff at bedside PROVIDER:[TOKEN:[[id number]],FOLLOWUP:[4-6 Days]],PROVIDER:[[id number]],FOLLOWUP:[7-10 Days]]

## 2021-10-14 NOTE — H&P ADULT - PROBLEM SELECTOR PLAN 3
- Jevity 1.5 tube feeds  - 5 cans for daily feeding, at 6 AM, 10 AM, 2 PM, 6 PM, 10 PM  - 125 cc of water before and after feeds - Jevity 1.5 tube feeds  - 5 cans for daily feeding, at 6 AM, 10 AM, 2 PM, 6 PM, 10 PM  - 125 cc of water before and after feeds  - Nutrition eval

## 2021-10-14 NOTE — CONSULT NOTE ADULT - SUBJECTIVE AND OBJECTIVE BOX
CHIEF COMPLAINT:    HPI:  55M PMH autism, seizures, sigmoid volvulus in  s/p exlap and ostomy, PEG placement in  for aspiration pneumonia presents from group home with CC of aspiration. Patient was heard coughing and was found with vomitus on floor around him as well as      PAST MEDICAL & SURGICAL HISTORY:  Autism disorder    Seizure disorder    Colostomy in place        FAMILY HISTORY:      SOCIAL HISTORY:  Smoking: [ ] Never Smoked [ ] Former Smoker (__ packs x ___ years) [ ] Current Smoker  (__ packs x ___ years)  Substance Use: [ ] Never Used [ ] Used ____  EtOH Use:  Marital Status: [ ] Single [ ]  [ ]  [ ]   Sexual History:   Occupation:  Recent Travel:  Country of Birth:  Advance Directives:    Allergies    No Known Allergies    Intolerances        HOME MEDICATIONS:    REVIEW OF SYSTEMS:  Constitutional: [ ] fevers [ ] chills [ ] weight loss [ ] weight gain  HEENT: [ ] dry eyes [ ] eye irritation [ ] postnasal drip [ ] nasal congestion  CV: [ ] chest pain [ ] orthopnea [ ] palpitations [ ] murmur  Resp: [ ] cough [ ] shortness of breath [ ] dyspnea [ ] wheezing [ ] sputum [ ] hemoptysis  GI: [ ] nausea [ ] vomiting [ ] diarrhea [ ] constipation [ ] abd pain [ ] dysphagia   : [ ] dysuria [ ] nocturia [ ] hematuria [ ] increased urinary frequency  Musculoskeletal: [ ] back pain [ ] myalgias [ ] arthralgias [ ] fracture  Skin: [ ] rash [ ] itch  Neurological: [ ] headache [ ] dizziness [ ] syncope [ ] weakness [ ] numbness  Psychiatric: [ ] anxiety [ ] depression  Endocrine: [ ] diabetes [ ] thyroid problem  Hematologic/Lymphatic: [ ] anemia [ ] bleeding problem  Allergic/Immunologic: [ ] itchy eyes [ ] nasal discharge [ ] hives [ ] angioedema  [ ] All other systems negative  [ ] Unable to assess ROS because ________    OBJECTIVE:  ICU Vital Signs Last 24 Hrs  T(C): 38.1 (14 Oct 2021 12:29), Max: 38.7 (14 Oct 2021 01:15)  T(F): 100.6 (14 Oct 2021 12:29), Max: 101.7 (14 Oct 2021 01:15)  HR: 87 (14 Oct 2021 14:18) (76 - 117)  BP: 83/57 (14 Oct 2021 14:18) (71/51 - 116/66)  BP(mean): 67 (14 Oct 2021 14:18) (58 - 81)  ABP: --  ABP(mean): --  RR: 20 (14 Oct 2021 14:18) (17 - 28)  SpO2: 99% (14 Oct 2021 14:18) (85% - 100%)        CAPILLARY BLOOD GLUCOSE          PHYSICAL EXAM:  General:   HEENT:   Lymph Nodes:  Neck:   Respiratory:   Cardiovascular:   Abdomen:   Extremities:   Skin:   Neurological:  Psychiatry:    LINES:     HOSPITAL MEDICATIONS:  MEDICATIONS  (STANDING):    MEDICATIONS  (PRN):      LABS:                        14.6   11.05 )-----------( 155      ( 14 Oct 2021 02:20 )             41.9     Hgb Trend: 14.6<--  1014    132<L>  |  95<L>  |  14  ----------------------------<  130<H>  4.0   |  25  |  0.55    Ca    9.4      14 Oct 2021 02:20  Mg     2.00     10-14    TPro  7.2  /  Alb  3.8  /  TBili  0.5  /  DBili  x   /  AST  20  /  ALT  17  /  AlkPhos  84  10-14    Creatinine Trend: 0.55<--    Urinalysis Basic - ( 14 Oct 2021 02:20 )    Color: Yellow / Appearance: Slightly Turbid / S.024 / pH: x  Gluc: x / Ketone: Negative  / Bili: Negative / Urobili: <2 mg/dL   Blood: x / Protein: 30 mg/dL / Nitrite: Negative   Leuk Esterase: Negative / RBC: 0-2 /HPF / WBC 0-2 /HPF   Sq Epi: x / Non Sq Epi: x / Bacteria: Negative        Venous Blood Gas:  10-14 @ 09:35  7.34/42/64/23/93.1  VBG Lactate: 1.2  Venous Blood Gas:  10-14 @ 02:20  7.41/43/133/27/99.4  VBG Lactate: 1.5      MICROBIOLOGY:     RADIOLOGY:  [ ] Reviewed and interpreted by me    EKG: CHIEF COMPLAINT:    HPI:  55M PMH autism, seizures, sigmoid volvulus in  s/p exlap and ostomy, PEG placement in  for aspiration pneumonia presents from group home with CC of aspiration. Patient was heard coughing and was found with vomitus on floor around him. Given history of aspiration they called EMS who found him hypoxemic but that improved with nasal cannula. In the ED was hypotensive after 3L NS and was started on levophed now weaned to midodrine with MAP > 65. CT concerning for opacities in RML and RLL.      PAST MEDICAL & SURGICAL HISTORY:  Autism disorder    Seizure disorder    Colostomy in place        FAMILY HISTORY:      SOCIAL HISTORY:  Smoking: [ ] Never Smoked [ ] Former Smoker (__ packs x ___ years) [ ] Current Smoker  (__ packs x ___ years)  Substance Use: [ ] Never Used [ ] Used ____  EtOH Use:  Marital Status: [ ] Single [ ]  [ ]  [ ]   Sexual History:   Occupation:  Recent Travel:  Country of Birth:  Advance Directives:    Allergies    No Known Allergies    Intolerances        HOME MEDICATIONS:    REVIEW OF SYSTEMS:  Constitutional: [ ] fevers [ ] chills [ ] weight loss [ ] weight gain  HEENT: [ ] dry eyes [ ] eye irritation [ ] postnasal drip [ ] nasal congestion  CV: [ ] chest pain [ ] orthopnea [ ] palpitations [ ] murmur  Resp: [ ] cough [ ] shortness of breath [ ] dyspnea [ ] wheezing [ ] sputum [ ] hemoptysis  GI: [ ] nausea [ ] vomiting [ ] diarrhea [ ] constipation [ ] abd pain [ ] dysphagia   : [ ] dysuria [ ] nocturia [ ] hematuria [ ] increased urinary frequency  Musculoskeletal: [ ] back pain [ ] myalgias [ ] arthralgias [ ] fracture  Skin: [ ] rash [ ] itch  Neurological: [ ] headache [ ] dizziness [ ] syncope [ ] weakness [ ] numbness  Psychiatric: [ ] anxiety [ ] depression  Endocrine: [ ] diabetes [ ] thyroid problem  Hematologic/Lymphatic: [ ] anemia [ ] bleeding problem  Allergic/Immunologic: [ ] itchy eyes [ ] nasal discharge [ ] hives [ ] angioedema  [ ] All other systems negative  [x ] Unable to assess ROS because ________    OBJECTIVE:  ICU Vital Signs Last 24 Hrs  T(C): 38.1 (14 Oct 2021 12:29), Max: 38.7 (14 Oct 2021 01:15)  T(F): 100.6 (14 Oct 2021 12:29), Max: 101.7 (14 Oct 2021 01:15)  HR: 87 (14 Oct 2021 14:18) (76 - 117)  BP: 83/57 (14 Oct 2021 14:18) (71/51 - 116/66)  BP(mean): 67 (14 Oct 2021 14:18) (58 - 81)  ABP: --  ABP(mean): --  RR: 20 (14 Oct 2021 14:18) (17 - 28)  SpO2: 99% (14 Oct 2021 14:18) (85% - 100%)        CAPILLARY BLOOD GLUCOSE          PHYSICAL EXAM:  General: cachectic, moaning, NAD  HEENT: dry MM, PERRL  Neck: no JVD  Respiratory: CTA bl no wheezing or crackles  Cardiovascular: RRR no murmurs  Abdomen: cachectic, soft nontender. PEG and ostomy bag  Extremities: WWP  Neurological: nonverbal at baseline, moving all extremities  Psychiatry:    LINES:     HOSPITAL MEDICATIONS:  MEDICATIONS  (STANDING):    MEDICATIONS  (PRN):      LABS:                        14.6   11.05 )-----------( 155      ( 14 Oct 2021 02:20 )             41.9     Hgb Trend: 14.6<--  10-14    132<L>  |  95<L>  |  14  ----------------------------<  130<H>  4.0   |  25  |  0.55    Ca    9.4      14 Oct 2021 02:20  Mg     2.00     10-14    TPro  7.2  /  Alb  3.8  /  TBili  0.5  /  DBili  x   /  AST  20  /  ALT  17  /  AlkPhos  84  10-14    Creatinine Trend: 0.55<--    Urinalysis Basic - ( 14 Oct 2021 02:20 )    Color: Yellow / Appearance: Slightly Turbid / S.024 / pH: x  Gluc: x / Ketone: Negative  / Bili: Negative / Urobili: <2 mg/dL   Blood: x / Protein: 30 mg/dL / Nitrite: Negative   Leuk Esterase: Negative / RBC: 0-2 /HPF / WBC 0-2 /HPF   Sq Epi: x / Non Sq Epi: x / Bacteria: Negative        Venous Blood Gas:  10-14 @ 09:35  7.34/42/64/23/93.1  VBG Lactate: 1.2  Venous Blood Gas:  10-14 @ 02:20  7.41/43/133/27/99.4  VBG Lactate: 1.5      MICROBIOLOGY:     RADIOLOGY:  [ ] Reviewed and interpreted by me    EKG:

## 2021-10-14 NOTE — ED PROVIDER NOTE - PROGRESS NOTE DETAILS
Baldo, PGY3 - Received pt as sign-out, TBA aspiration pna, O2 Sat 85% on RA, satting 97% with 4L via NC, received Zosyn/Vanc. Pending CT A/P read, as pt has h/o obstruction. Trung: has had BPs of 70s-low 90s; was placed on levophed (on 0.09mcg/kg/min) taken off and placed on midodrine per ICU; his Bps have remained with MAPS of 70 with systolic in high 80s; spoke with ICU attending who stated pt could be admitted to floor; gave report to Dr. Dominguez

## 2021-10-14 NOTE — ED ADULT NURSE REASSESSMENT NOTE - NS ED NURSE REASSESS COMMENT FT1
Patient was discontinued off levophed as per MD. Patient received midodrine. Patient VS in flow sheet, rectal temp 100.6, Attending Trung made aware. Patient aide still at bedside, patient is in bed, changed and positioned for comfort.

## 2021-10-14 NOTE — H&P ADULT - NSICDXPASTSURGICALHX_GEN_ALL_CORE_FT
PAST SURGICAL HISTORY:  Colostomy in place 1/2021    S/P exploratory laparotomy 1/2021    S/P partial colectomy sigmoid colectomy 1/2021

## 2021-10-14 NOTE — H&P ADULT - PROBLEM SELECTOR PLAN 5
FEN/GI: NPO  Lines:  PPx: lovenox  Dispo: Admit to Medicine  Code Status: FULL, pending discussion    Plan was discussed with team and Dr. Johnson on rounds.  Signed,     Hanna Chow MD  Internal Medicine PGY 1  [facility]: [id number]  [facility]: [phone] - continue home benztropine thru G tube

## 2021-10-14 NOTE — H&P ADULT - NSHPSOCIALHISTORY_GEN_ALL_CORE
Lives in [facility] group home for individuals with intellectual delay, no tobacco use, no alcohol or drug use.

## 2021-10-14 NOTE — H&P ADULT - PROBLEM SELECTOR PLAN 8
FEN/GI: NPO  Lines:  PPx: lovenox  Dispo: Admit to Medicine  Code Status: FULL, pending discussion    Plan was discussed with team and Dr. Johnson on rounds.  Signed,     Hanna Chow MD  Internal Medicine PGY 1  [facility]: [id number]  [facility]: [phone] FEN/GI: NPO on IVF  Lines:  PPx: lovenox  Dispo: Admit to Medicine  Code Status: FULL, pending discussion    Plan was discussed with team and Dr. Johnson on rounds.  Signed,     Hanna Chow MD  Internal Medicine PGY 1  [facility]: [id number]  [facility]: [phone]

## 2021-10-15 LAB
CULTURE RESULTS: NO GROWTH — SIGNIFICANT CHANGE UP
SPECIMEN SOURCE: SIGNIFICANT CHANGE UP

## 2021-10-15 PROCEDURE — 99233 SBSQ HOSP IP/OBS HIGH 50: CPT | Mod: GC

## 2021-10-15 RX ORDER — SODIUM CHLORIDE 9 MG/ML
1000 INJECTION, SOLUTION INTRAVENOUS
Refills: 0 | Status: DISCONTINUED | OUTPATIENT
Start: 2021-10-15 | End: 2021-10-18

## 2021-10-15 RX ORDER — MIDODRINE HYDROCHLORIDE 2.5 MG/1
10 TABLET ORAL EVERY 8 HOURS
Refills: 0 | Status: DISCONTINUED | OUTPATIENT
Start: 2021-10-15 | End: 2021-10-17

## 2021-10-15 RX ORDER — IPRATROPIUM/ALBUTEROL SULFATE 18-103MCG
3 AEROSOL WITH ADAPTER (GRAM) INHALATION EVERY 6 HOURS
Refills: 0 | Status: COMPLETED | OUTPATIENT
Start: 2021-10-15 | End: 2021-10-17

## 2021-10-15 RX ORDER — POLYETHYLENE GLYCOL 3350 17 G/17G
17 POWDER, FOR SOLUTION ORAL DAILY
Refills: 0 | Status: DISCONTINUED | OUTPATIENT
Start: 2021-10-15 | End: 2021-11-10

## 2021-10-15 RX ORDER — MIDODRINE HYDROCHLORIDE 2.5 MG/1
10 TABLET ORAL EVERY 8 HOURS
Refills: 0 | Status: DISCONTINUED | OUTPATIENT
Start: 2021-10-15 | End: 2021-10-15

## 2021-10-15 RX ORDER — SODIUM CHLORIDE 9 MG/ML
4 INJECTION INTRAMUSCULAR; INTRAVENOUS; SUBCUTANEOUS EVERY 6 HOURS
Refills: 0 | Status: COMPLETED | OUTPATIENT
Start: 2021-10-15 | End: 2021-10-16

## 2021-10-15 RX ADMIN — MIDODRINE HYDROCHLORIDE 10 MILLIGRAM(S): 2.5 TABLET ORAL at 21:58

## 2021-10-15 RX ADMIN — Medication 3 MILLILITER(S): at 16:03

## 2021-10-15 RX ADMIN — ENOXAPARIN SODIUM 40 MILLIGRAM(S): 100 INJECTION SUBCUTANEOUS at 13:13

## 2021-10-15 RX ADMIN — MIDODRINE HYDROCHLORIDE 10 MILLIGRAM(S): 2.5 TABLET ORAL at 06:15

## 2021-10-15 RX ADMIN — Medication 1 DROP(S): at 05:41

## 2021-10-15 RX ADMIN — Medication 1 DROP(S): at 18:27

## 2021-10-15 RX ADMIN — PIPERACILLIN AND TAZOBACTAM 25 GRAM(S): 4; .5 INJECTION, POWDER, LYOPHILIZED, FOR SOLUTION INTRAVENOUS at 05:41

## 2021-10-15 RX ADMIN — PIPERACILLIN AND TAZOBACTAM 25 GRAM(S): 4; .5 INJECTION, POWDER, LYOPHILIZED, FOR SOLUTION INTRAVENOUS at 21:58

## 2021-10-15 RX ADMIN — SODIUM CHLORIDE 4 MILLILITER(S): 9 INJECTION INTRAMUSCULAR; INTRAVENOUS; SUBCUTANEOUS at 21:44

## 2021-10-15 RX ADMIN — POLYETHYLENE GLYCOL 3350 17 GRAM(S): 17 POWDER, FOR SOLUTION ORAL at 18:28

## 2021-10-15 RX ADMIN — LEVETIRACETAM 500 MILLIGRAM(S): 250 TABLET, FILM COATED ORAL at 18:27

## 2021-10-15 RX ADMIN — SODIUM CHLORIDE 75 MILLILITER(S): 9 INJECTION, SOLUTION INTRAVENOUS at 13:12

## 2021-10-15 RX ADMIN — PIPERACILLIN AND TAZOBACTAM 25 GRAM(S): 4; .5 INJECTION, POWDER, LYOPHILIZED, FOR SOLUTION INTRAVENOUS at 13:12

## 2021-10-15 RX ADMIN — LEVETIRACETAM 500 MILLIGRAM(S): 250 TABLET, FILM COATED ORAL at 06:16

## 2021-10-15 RX ADMIN — SODIUM CHLORIDE 4 MILLILITER(S): 9 INJECTION INTRAMUSCULAR; INTRAVENOUS; SUBCUTANEOUS at 16:06

## 2021-10-15 RX ADMIN — Medication 3 MILLILITER(S): at 21:45

## 2021-10-15 RX ADMIN — MIDODRINE HYDROCHLORIDE 10 MILLIGRAM(S): 2.5 TABLET ORAL at 13:12

## 2021-10-15 NOTE — DIETITIAN INITIAL EVALUATION ADULT. - ADD RECOMMEND
1). Monitor weights, labs, BM's, skin integrity and enteral feeding tolerance. 2). Follow pt as per protocol.

## 2021-10-15 NOTE — PROGRESS NOTE ADULT - SUBJECTIVE AND OBJECTIVE BOX
PROGRESS NOTE:   Authored by Hanna Chow MD  Internal Medicine  Pager JOHNNY [phone]  Pager [phone]    Patient is a 55y old  Male who presents with a chief complaint of septic shock (14 Oct 2021 17:47)      SUBJECTIVE / OVERNIGHT EVENTS: NAEO. patient seen and examined at bedside.     MEDICATIONS  (STANDING):  artificial  tears Solution 1 Drop(s) Both EYES every 12 hours  enoxaparin Injectable 40 milliGRAM(s) SubCutaneous daily  lactated ringers. 1000 milliLiter(s) (75 mL/Hr) IV Continuous <Continuous>  levETIRAcetam  Solution 500 milliGRAM(s) Oral two times a day  midodrine 10 milliGRAM(s) Oral every 8 hours  piperacillin/tazobactam IVPB.. 3.375 Gram(s) IV Intermittent every 8 hours  polyethylene glycol 3350 17 Gram(s) Oral <User Schedule>    MEDICATIONS  (PRN):  haloperidol    Concentrate 2 milliGRAM(s) Oral every 12 hours PRN agitation  ondansetron Injectable 4 milliGRAM(s) IV Push every 8 hours PRN Nausea and/or Vomiting      CAPILLARY BLOOD GLUCOSE        I&O's Summary      PHYSICAL EXAM:  Vital Signs Last 24 Hrs  T(C): 36.4 (15 Oct 2021 05:39), Max: 38.1 (14 Oct 2021 12:29)  T(F): 97.6 (15 Oct 2021 05:39), Max: 100.6 (14 Oct 2021 12:29)  HR: 87 (15 Oct 2021 05:39) (59 - 98)  BP: 94/54 (15 Oct 2021 05:39) (71/51 - 116/66)  BP(mean): 65 (14 Oct 2021 19:14) (58 - 81)  RR: 21 (15 Oct 2021 05:39) (17 - 28)  SpO2: 99% (15 Oct 2021 05:39) (97% - 100%)  CONSTITUTIONAL: Well-groomed, in no apparent distress  EYES: No conjunctival or scleral injection, non-icteric;   ENMT: No external nasal lesions; MMM  NECK: Trachea midline without palpable neck mass; thyroid not enlarged and non-tender  RESPIRATORY: Breathing comfortably; no dullness to percussion; lungs CTA without wheeze/rhonchi/rales  CARDIOVASCULAR: +S1S2, RRR, no M/G/R; pedal pulses full and symmetric; no lower extremity edema  GASTROINTESTINAL: No palpable masses or tenderness, +BS throughout, no rebound/guarding; no hepatosplenomegaly; no hernia palpated  LYMPHATIC: No cervical LAD or tenderness  SKIN: No rashes or ulcers noted  NEUROLOGIC: CN II-XII intact; sensation intact in LEs b/l to light touch  PSYCHIATRIC: A+O x 3; mood and affect appropriate; appropriate insight and judgment    LABS:                        14.6   11.05 )-----------( 155      ( 14 Oct 2021 02:20 )             41.9     10-14    132<L>  |  95<L>  |  14  ----------------------------<  130<H>  4.0   |  25  |  0.55    Ca    9.4      14 Oct 2021 02:20  Mg     2.00     10-14    TPro  7.2  /  Alb  3.8  /  TBili  0.5  /  DBili  x   /  AST  20  /  ALT  17  /  AlkPhos  84  10-14          Urinalysis Basic - ( 14 Oct 2021 02:20 )    Color: Yellow / Appearance: Slightly Turbid / S.024 / pH: x  Gluc: x / Ketone: Negative  / Bili: Negative / Urobili: <2 mg/dL   Blood: x / Protein: 30 mg/dL / Nitrite: Negative   Leuk Esterase: Negative / RBC: 0-2 /HPF / WBC 0-2 /HPF   Sq Epi: x / Non Sq Epi: x / Bacteria: Negative        Culture - Blood (collected 14 Oct 2021 05:35)  Source: .Blood Blood-Venous  Preliminary Report (15 Oct 2021 06:01):    No growth to date.    Culture - Blood (collected 14 Oct 2021 05:35)  Source: .Blood Blood-Venous  Preliminary Report (15 Oct 2021 06:01):    No growth to date.        RADIOLOGY & ADDITIONAL TESTS:  Results Reviewed:   Imaging Personally Reviewed:  Electrocardiogram Personally Reviewed:    COORDINATION OF CARE:  Care Discussed with Consultants/Other Providers [Y/N]:  Prior or Outpatient Records Reviewed [Y/N]:   PROGRESS NOTE:   Authored by Hanna Chow MD  Internal Medicine  Pager JOHNNY [phone]  Pager [phone]    Patient is a 55y old  Male who presents with a chief complaint of septic shock (14 Oct 2021 17:47)      SUBJECTIVE / OVERNIGHT EVENTS: NAEO. patient seen and examined at bedside. coughing    MEDICATIONS  (STANDING):  artificial  tears Solution 1 Drop(s) Both EYES every 12 hours  enoxaparin Injectable 40 milliGRAM(s) SubCutaneous daily  lactated ringers. 1000 milliLiter(s) (75 mL/Hr) IV Continuous <Continuous>  levETIRAcetam  Solution 500 milliGRAM(s) Oral two times a day  midodrine 10 milliGRAM(s) Oral every 8 hours  piperacillin/tazobactam IVPB.. 3.375 Gram(s) IV Intermittent every 8 hours  polyethylene glycol 3350 17 Gram(s) Oral <User Schedule>    MEDICATIONS  (PRN):  haloperidol    Concentrate 2 milliGRAM(s) Oral every 12 hours PRN agitation  ondansetron Injectable 4 milliGRAM(s) IV Push every 8 hours PRN Nausea and/or Vomiting      CAPILLARY BLOOD GLUCOSE        I&O's Summary      PHYSICAL EXAM:  Vital Signs Last 24 Hrs  T(C): 36.4 (15 Oct 2021 05:39), Max: 38.1 (14 Oct 2021 12:29)  T(F): 97.6 (15 Oct 2021 05:39), Max: 100.6 (14 Oct 2021 12:29)  HR: 87 (15 Oct 2021 05:39) (59 - 98)  BP: 94/54 (15 Oct 2021 05:39) (71/51 - 116/66)  BP(mean): 65 (14 Oct 2021 19:14) (58 - 81)  RR: 21 (15 Oct 2021 05:39) (17 - 28)  SpO2: 99% (15 Oct 2021 05:39) (97% - 100%)  CONSTITUTIONAL: Well-groomed, in no apparent distress  EYES: No conjunctival or scleral injection, non-icteric;   ENMT: No external nasal lesions; MMM  NECK: Trachea midline without palpable neck mass; thyroid not enlarged and non-tender  RESPIRATORY: Breathing comfortably; no dullness to percussion; lungs CTA without wheeze/rhonchi/rales  CARDIOVASCULAR: +S1S2, RRR, no M/G/R; pedal pulses full and symmetric; no lower extremity edema  GASTROINTESTINAL: No palpable masses or tenderness, +BS throughout, no rebound/guarding; G tube and colostomy bag in place  LYMPHATIC: No cervical LAD or tenderness  SKIN: No rashes or ulcers noted  NEUROLOGIC: alert, nonverbal, tracking, does not follow commands  PSYCHIATRIC: Alert    LABS:                        14.6   11.05 )-----------( 155      ( 14 Oct 2021 02:20 )             41.9     10-14    132<L>  |  95<L>  |  14  ----------------------------<  130<H>  4.0   |  25  |  0.55    Ca    9.4      14 Oct 2021 02:20  Mg     2.00     10-14    TPro  7.2  /  Alb  3.8  /  TBili  0.5  /  DBili  x   /  AST  20  /  ALT  17  /  AlkPhos  84  10-14          Urinalysis Basic - ( 14 Oct 2021 02:20 )    Color: Yellow / Appearance: Slightly Turbid / S.024 / pH: x  Gluc: x / Ketone: Negative  / Bili: Negative / Urobili: <2 mg/dL   Blood: x / Protein: 30 mg/dL / Nitrite: Negative   Leuk Esterase: Negative / RBC: 0-2 /HPF / WBC 0-2 /HPF   Sq Epi: x / Non Sq Epi: x / Bacteria: Negative        Culture - Blood (collected 14 Oct 2021 05:35)  Source: .Blood Blood-Venous  Preliminary Report (15 Oct 2021 06:01):    No growth to date.    Culture - Blood (collected 14 Oct 2021 05:35)  Source: .Blood Blood-Venous  Preliminary Report (15 Oct 2021 06:01):    No growth to date.        RADIOLOGY & ADDITIONAL TESTS:  Results Reviewed:   Imaging Personally Reviewed:  Electrocardiogram Personally Reviewed:    COORDINATION OF CARE:  Care Discussed with Consultants/Other Providers [Y/N]:  Prior or Outpatient Records Reviewed [Y/N]:

## 2021-10-15 NOTE — PROGRESS NOTE ADULT - PROBLEM SELECTOR PLAN 1
- SIRS+: fever, leukocytosis, tachycardia, tachypnea, on 5L oxygen, CTAP with RLL consolidation s/p aspiration event. Hypotensive MAP <65 requiring levophed, now weaned. received fluid resuscitation and 20 mg midodrine in ED with appropriate response  - POCUS in ED with a-line predominance in anterior lung fields bilaterally. hepatization of right lung, B lines at right lower hemithorax, with air bronchograms. No pleural effusion appreciated  - received vanc and zosyn in ED  - MICU consulted, not a candidate at this time  - continue zosyn  - midodrine 10 q8  - can give 10 mg midodrine x1 if SBP <90. per MICU, can go up to 30 mg midodrine TID  - IVF  - nutrition consult given aspiration event and optimizing tube feeds  - maintain MAP >65  - NPO  - blood cultures and ucx  - vitals and pulse ox q4 - SIRS+: fever, leukocytosis, tachycardia, tachypnea, on 5L oxygen, CTAP with RLL consolidation s/p aspiration event. Hypotensive MAP <65 requiring levophed, now weaned. received fluid resuscitation and 20 mg midodrine in ED with appropriate response  - POCUS in ED with a-line predominance in anterior lung fields bilaterally. hepatization of right lung, B lines at right lower hemithorax, with air bronchograms. No pleural effusion appreciated  - received vanc and zosyn in ED  - MICU consulted, not a candidate at this time  - continue zosyn x 7days  - midodrine 10 q8, consider weaning once blood pressures improve  - can give 10 mg midodrine x1 if SBP <90. per MICU, can go up to 30 mg midodrine TID  - IVF for 12 hours  - nutrition consult given aspiration event and optimizing tube feeds  - maintain MAP >65  - begin trickle feeds  - CPT, duonebs q6, hypertonic saline  - blood cultures and ucx  - vitals and pulse ox q4

## 2021-10-15 NOTE — DIETITIAN INITIAL EVALUATION ADULT. - OTHER INFO
54 y/o male from Group Home, nonverbal, with hx of autism, intellectual and developmental delay, admitted with dx sepsis 2* PNA. Nutrition consult ordered as pt noted to have had aspiration event of TF while at group home, with hx of vomiting up TF with ongoing coughing and gurgling since started on TF June/21. Pt has now been restarted on TF this afternoon at low rate. Ordered TF providing 864 kcal, 40 gm protein, 581 ml free H2O in total volume of 720 ml/day. Possibilities as to feeding intolerance PTA include TF boluses too high (?), total volume of feeding too much (?), enteral feeding too concentrated/too high osmolarity (?). Recommend providing TF continuously over 24 hrs and if pt able to tolerate, suggest advancing by 10 ml/hr, every 6 hrs to goal rate of 55 ml/hr x 24 hrs. This will offer pt 1584 kcal, 73 gm protein, 1065 ml free H2O in total volume of 1320 ml/day. If pt tolerates goal rate, may consider bolus feedings of 220 ml, every 4 hrs, for same 1320 ml total volume. If pt unable to tolerate continuous feeding, suggest consulting GI and re-consult this service for other possible interventions. Pt exhibits moderate subcutaneous fat store loss and muscle mass wasting which place him at severe risk for malnutrition. RDN services to remain available as needed.

## 2021-10-15 NOTE — DIETITIAN NUTRITION RISK NOTIFICATION - TREATMENT: THE FOLLOWING DIET HAS BEEN RECOMMENDED
Diet, NPO with Tube Feed:   Tube Feeding Modality: Gastrostomy  Jevity 1.2 Tacho (JEVITY1.2RTH)  Total Volume for 24 Hours (mL): 720  Continuous  Starting Tube Feed Rate {mL per Hour}: 10  Increase Tube Feed Rate by (mL): 10     Every 4 hours  Until Goal Tube Feed Rate (mL per Hour): 30  Tube Feed Duration (in Hours): 24  Tube Feed Start Time: 12:00 (10-15-21 @ 10:28) [Active]

## 2021-10-15 NOTE — DIETITIAN INITIAL EVALUATION ADULT. - PERTINENT MEDS FT
MEDICATIONS  (STANDING):  albuterol/ipratropium for Nebulization 3 milliLiter(s) Nebulizer every 6 hours  artificial  tears Solution 1 Drop(s) Both EYES every 12 hours  enoxaparin Injectable 40 milliGRAM(s) SubCutaneous daily  lactated ringers. 1000 milliLiter(s) (75 mL/Hr) IV Continuous <Continuous>  levETIRAcetam  Solution 500 milliGRAM(s) Oral two times a day  midodrine 10 milliGRAM(s) Oral every 8 hours  piperacillin/tazobactam IVPB.. 3.375 Gram(s) IV Intermittent every 8 hours  polyethylene glycol 3350 17 Gram(s) Oral <User Schedule>  sodium chloride 3%  Inhalation 4 milliLiter(s) Inhalation every 6 hours

## 2021-10-15 NOTE — PROGRESS NOTE ADULT - PROBLEM SELECTOR PLAN 2
Per d/w group home nurse, patient has remote history of seizures, now well controlled  - continue keppra thru G tube

## 2021-10-15 NOTE — PATIENT PROFILE ADULT - LANGUAGE ASSISTANCE NEEDED
pt is nonverbal, unable to assess/No-Patient/Caregiver offered and refused free interpretation services.

## 2021-10-15 NOTE — PROGRESS NOTE ADULT - PROBLEM SELECTOR PLAN 8
FEN/GI: NPO on IVF  Lines:  PPx: lovenox  Dispo: Admit to Medicine  Code Status: FULL, pending discussion    Plan was discussed with team and Dr. Johnson on rounds.  Signed,     Hanna Chow MD  Internal Medicine PGY 1  [facility]: [id number]  [facility]: [phone] FEN/GI: trickle feeds, start at 10 cc, and increase by 10 to goal 30  Lines:  PPx: lovenox  Dispo: continue routine inpatient care  Code Status: FULL, pending discussion    Plan was discussed with team and Dr. Johnson on rounds.  Signed,     Hanna Chow MD  Internal Medicine PGY 1  [facility]: [id number]  [facility]: [phone]

## 2021-10-15 NOTE — PROGRESS NOTE ADULT - PROBLEM SELECTOR PLAN 3
- Jevity 1.5 tube feeds  - 5 cans for daily feeding, at 6 AM, 10 AM, 2 PM, 6 PM, 10 PM  - 125 cc of water before and after feeds  - Nutrition eval

## 2021-10-15 NOTE — PROGRESS NOTE ADULT - ATTENDING COMMENTS
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 Patient briefly on pressors in ED, seen by MICU and not a candidate. Start on midodrine for BP support.     Patient non-verbal, unable to participate in HPI, no aide at bedside. Lungs w/ course upper airway sounds and diffuse rhonchi. Non-productive cough seen.     #Septic shock 2/2 aspiration PNA  - Patient meets sepsis criteria w/ fever, tachycardia. Briefly required levophed gtt, now off and requiring midodrine 10mg q8h for persistently low BPs  - CXR and CT chest w/ RLL opacity consistent w/ likely aspiration event.   - Will c/w Zosyn and maintenance IVF for now   - Blood cultures NGTD   - Aggressive chest PT, pulmonary toilet for secretions   - Nutrition eval to assist w/ G tube feeds     Discussed with HS3 Dr. Chow

## 2021-10-16 DIAGNOSIS — J69.0 PNEUMONITIS DUE TO INHALATION OF FOOD AND VOMIT: ICD-10-CM

## 2021-10-16 LAB
ALBUMIN SERPL ELPH-MCNC: 2.8 G/DL — LOW (ref 3.3–5)
ALP SERPL-CCNC: 62 U/L — SIGNIFICANT CHANGE UP (ref 40–120)
ALT FLD-CCNC: 12 U/L — SIGNIFICANT CHANGE UP (ref 4–41)
ANION GAP SERPL CALC-SCNC: 10 MMOL/L — SIGNIFICANT CHANGE UP (ref 7–14)
AST SERPL-CCNC: 18 U/L — SIGNIFICANT CHANGE UP (ref 4–40)
BASOPHILS # BLD AUTO: 0.01 K/UL — SIGNIFICANT CHANGE UP (ref 0–0.2)
BASOPHILS NFR BLD AUTO: 0.1 % — SIGNIFICANT CHANGE UP (ref 0–2)
BILIRUB SERPL-MCNC: 0.3 MG/DL — SIGNIFICANT CHANGE UP (ref 0.2–1.2)
BUN SERPL-MCNC: 10 MG/DL — SIGNIFICANT CHANGE UP (ref 7–23)
CALCIUM SERPL-MCNC: 8.4 MG/DL — SIGNIFICANT CHANGE UP (ref 8.4–10.5)
CHLORIDE SERPL-SCNC: 102 MMOL/L — SIGNIFICANT CHANGE UP (ref 98–107)
CO2 SERPL-SCNC: 26 MMOL/L — SIGNIFICANT CHANGE UP (ref 22–31)
CREAT SERPL-MCNC: 0.41 MG/DL — LOW (ref 0.5–1.3)
EOSINOPHIL # BLD AUTO: 0.01 K/UL — SIGNIFICANT CHANGE UP (ref 0–0.5)
EOSINOPHIL NFR BLD AUTO: 0.1 % — SIGNIFICANT CHANGE UP (ref 0–6)
GLUCOSE SERPL-MCNC: 116 MG/DL — HIGH (ref 70–99)
HCT VFR BLD CALC: 34 % — LOW (ref 39–50)
HGB BLD-MCNC: 11.8 G/DL — LOW (ref 13–17)
IANC: 6.86 K/UL — SIGNIFICANT CHANGE UP (ref 1.5–8.5)
IMM GRANULOCYTES NFR BLD AUTO: 0.5 % — SIGNIFICANT CHANGE UP (ref 0–1.5)
LYMPHOCYTES # BLD AUTO: 0.82 K/UL — LOW (ref 1–3.3)
LYMPHOCYTES # BLD AUTO: 10.1 % — LOW (ref 13–44)
MAGNESIUM SERPL-MCNC: 2 MG/DL — SIGNIFICANT CHANGE UP (ref 1.6–2.6)
MCHC RBC-ENTMCNC: 32.2 PG — SIGNIFICANT CHANGE UP (ref 27–34)
MCHC RBC-ENTMCNC: 34.7 GM/DL — SIGNIFICANT CHANGE UP (ref 32–36)
MCV RBC AUTO: 92.6 FL — SIGNIFICANT CHANGE UP (ref 80–100)
MONOCYTES # BLD AUTO: 0.34 K/UL — SIGNIFICANT CHANGE UP (ref 0–0.9)
MONOCYTES NFR BLD AUTO: 4.2 % — SIGNIFICANT CHANGE UP (ref 2–14)
NEUTROPHILS # BLD AUTO: 6.86 K/UL — SIGNIFICANT CHANGE UP (ref 1.8–7.4)
NEUTROPHILS NFR BLD AUTO: 85 % — HIGH (ref 43–77)
NRBC # BLD: 0 /100 WBCS — SIGNIFICANT CHANGE UP
NRBC # FLD: 0 K/UL — SIGNIFICANT CHANGE UP
PHOSPHATE SERPL-MCNC: 2.9 MG/DL — SIGNIFICANT CHANGE UP (ref 2.5–4.5)
PLATELET # BLD AUTO: 137 K/UL — LOW (ref 150–400)
POTASSIUM SERPL-MCNC: 3.3 MMOL/L — LOW (ref 3.5–5.3)
POTASSIUM SERPL-SCNC: 3.3 MMOL/L — LOW (ref 3.5–5.3)
PROT SERPL-MCNC: 5.8 G/DL — LOW (ref 6–8.3)
RBC # BLD: 3.67 M/UL — LOW (ref 4.2–5.8)
RBC # FLD: 13.2 % — SIGNIFICANT CHANGE UP (ref 10.3–14.5)
SODIUM SERPL-SCNC: 138 MMOL/L — SIGNIFICANT CHANGE UP (ref 135–145)
WBC # BLD: 8.08 K/UL — SIGNIFICANT CHANGE UP (ref 3.8–10.5)
WBC # FLD AUTO: 8.08 K/UL — SIGNIFICANT CHANGE UP (ref 3.8–10.5)

## 2021-10-16 PROCEDURE — 99233 SBSQ HOSP IP/OBS HIGH 50: CPT | Mod: GC

## 2021-10-16 RX ORDER — POTASSIUM CHLORIDE 20 MEQ
10 PACKET (EA) ORAL
Refills: 0 | Status: COMPLETED | OUTPATIENT
Start: 2021-10-16 | End: 2021-10-16

## 2021-10-16 RX ADMIN — Medication 3 MILLILITER(S): at 04:22

## 2021-10-16 RX ADMIN — MIDODRINE HYDROCHLORIDE 10 MILLIGRAM(S): 2.5 TABLET ORAL at 21:37

## 2021-10-16 RX ADMIN — ENOXAPARIN SODIUM 40 MILLIGRAM(S): 100 INJECTION SUBCUTANEOUS at 13:04

## 2021-10-16 RX ADMIN — PIPERACILLIN AND TAZOBACTAM 25 GRAM(S): 4; .5 INJECTION, POWDER, LYOPHILIZED, FOR SOLUTION INTRAVENOUS at 05:53

## 2021-10-16 RX ADMIN — Medication 100 MILLIEQUIVALENT(S): at 11:35

## 2021-10-16 RX ADMIN — Medication 3 MILLILITER(S): at 10:12

## 2021-10-16 RX ADMIN — Medication 1 DROP(S): at 05:52

## 2021-10-16 RX ADMIN — Medication 100 MILLIEQUIVALENT(S): at 10:40

## 2021-10-16 RX ADMIN — Medication 3 MILLILITER(S): at 22:42

## 2021-10-16 RX ADMIN — MIDODRINE HYDROCHLORIDE 10 MILLIGRAM(S): 2.5 TABLET ORAL at 05:51

## 2021-10-16 RX ADMIN — LEVETIRACETAM 500 MILLIGRAM(S): 250 TABLET, FILM COATED ORAL at 17:35

## 2021-10-16 RX ADMIN — SODIUM CHLORIDE 4 MILLILITER(S): 9 INJECTION INTRAMUSCULAR; INTRAVENOUS; SUBCUTANEOUS at 04:22

## 2021-10-16 RX ADMIN — PIPERACILLIN AND TAZOBACTAM 25 GRAM(S): 4; .5 INJECTION, POWDER, LYOPHILIZED, FOR SOLUTION INTRAVENOUS at 21:22

## 2021-10-16 RX ADMIN — MIDODRINE HYDROCHLORIDE 10 MILLIGRAM(S): 2.5 TABLET ORAL at 13:04

## 2021-10-16 RX ADMIN — LEVETIRACETAM 500 MILLIGRAM(S): 250 TABLET, FILM COATED ORAL at 05:52

## 2021-10-16 RX ADMIN — Medication 1 DROP(S): at 17:35

## 2021-10-16 RX ADMIN — Medication 3 MILLILITER(S): at 16:01

## 2021-10-16 RX ADMIN — Medication 100 MILLIEQUIVALENT(S): at 09:43

## 2021-10-16 RX ADMIN — SODIUM CHLORIDE 4 MILLILITER(S): 9 INJECTION INTRAMUSCULAR; INTRAVENOUS; SUBCUTANEOUS at 10:12

## 2021-10-16 RX ADMIN — PIPERACILLIN AND TAZOBACTAM 25 GRAM(S): 4; .5 INJECTION, POWDER, LYOPHILIZED, FOR SOLUTION INTRAVENOUS at 13:04

## 2021-10-16 NOTE — PROGRESS NOTE ADULT - PROBLEM SELECTOR PLAN 2
- presented with cough, fever and new oxygen requirement after observed aspiration event from g-tube feeds  - CTAP with alveolar opacities   - continue zosyn x7days  - CPT, duonebs q6, hypertonic saline  - wean oxygen as tolerated - presented with cough, fever and new oxygen requirement after observed aspiration event from g-tube feeds  - CTAP with alveolar opacities   - continue zosyn x7days (10/14-10/21)  - CPT, duonebs q6, hypertonic saline  - wean oxygen as tolerated

## 2021-10-16 NOTE — PROGRESS NOTE ADULT - PROBLEM SELECTOR PLAN 1
- SIRS+: fever, leukocytosis, tachycardia, tachypnea, on 5L oxygen, CTAP with RLL consolidation s/p aspiration event. Hypotensive MAP <65 requiring levophed, now weaned. received fluid resuscitation and 20 mg midodrine in ED with appropriate response  - POCUS in ED with a-line predominance in anterior lung fields bilaterally. hepatization of right lung, B lines at right lower hemithorax, with air bronchograms. No pleural effusion appreciated  - received vanc and zosyn in ED  - MICU consulted, not a candidate at this time  - continue zosyn x 7days  - midodrine 10 q8, consider weaning once blood pressures improve  - can give 10 mg midodrine x1 if SBP <90. per MICU, can go up to 30 mg midodrine TID  - IVF for 12 hours  - nutrition consult given aspiration event and optimizing tube feeds  - maintain MAP >65  - begin trickle feeds  - blood cultures and ucx  - vitals and pulse ox q4 - SIRS+: fever, leukocytosis, tachycardia, tachypnea, on 5L oxygen, CTAP with RLL consolidation s/p aspiration event. Hypotensive MAP <65 requiring levophed, now weaned. received fluid resuscitation and 20 mg midodrine in ED with appropriate response  - POCUS in ED with a-line predominance in anterior lung fields bilaterally. hepatization of right lung, B lines at right lower hemithorax, with air bronchograms. No pleural effusion appreciated  - received vanc and zosyn in ED  - MICU consulted, not a candidate at this time  - continue zosyn x 7days  - midodrine 10 q8, consider weaning once blood pressures improve  - can give 10 mg midodrine x1 if SBP <90. per MICU, can go up to 30 mg midodrine TID  - IVF for 12 hours  - nutrition consult given aspiration event and optimizing tube feeds  - maintain MAP >65  - blood cultures and ucx  - vitals and pulse ox q4 - SIRS+: fever, leukocytosis, tachycardia, tachypnea, on 5L oxygen, CTAP with RLL consolidation s/p aspiration event. Hypotensive MAP <65 requiring levophed, now weaned. received fluid resuscitation and 20 mg midodrine in ED with appropriate response  - POCUS in ED with a-line predominance in anterior lung fields bilaterally. hepatization of right lung, B lines at right lower hemithorax, with air bronchograms. No pleural effusion appreciated  - received vanc and zosyn in ED  - MICU consulted, not a candidate at this time  - continue zosyn x 7days  - midodrine 10 q8, consider weaning once blood pressures improve. per MICU, can go up to 30 mg midodrine TID  - IVF for 12 hours  - nutrition consult given aspiration event and optimizing tube feeds  - maintain MAP >65  - blood cultures and ucx  - vitals and pulse ox q4

## 2021-10-16 NOTE — PROGRESS NOTE ADULT - SUBJECTIVE AND OBJECTIVE BOX
PROGRESS NOTE:   Authored by Hanna Chow MD  Internal Medicine  Pager JOHNNY [phone]  Pager [phone]    Patient is a 55y old  Male who presents with a chief complaint of septic shock (15 Oct 2021 14:38)      SUBJECTIVE / OVERNIGHT EVENTS: NAEO. patient seen and examined at bedside.    MEDICATIONS  (STANDING):  albuterol/ipratropium for Nebulization 3 milliLiter(s) Nebulizer every 6 hours  artificial  tears Solution 1 Drop(s) Both EYES every 12 hours  enoxaparin Injectable 40 milliGRAM(s) SubCutaneous daily  lactated ringers. 1000 milliLiter(s) (75 mL/Hr) IV Continuous <Continuous>  levETIRAcetam  Solution 500 milliGRAM(s) Oral two times a day  midodrine 10 milliGRAM(s) Oral every 8 hours  piperacillin/tazobactam IVPB.. 3.375 Gram(s) IV Intermittent every 8 hours  polyethylene glycol 3350 17 Gram(s) Oral <User Schedule>  sodium chloride 3%  Inhalation 4 milliLiter(s) Inhalation every 6 hours    MEDICATIONS  (PRN):  haloperidol    Concentrate 2 milliGRAM(s) Oral every 12 hours PRN agitation  ondansetron Injectable 4 milliGRAM(s) IV Push every 8 hours PRN Nausea and/or Vomiting  polyethylene glycol 3350 17 Gram(s) Oral daily PRN Constipation      CAPILLARY BLOOD GLUCOSE        I&O's Summary      PHYSICAL EXAM:  Vital Signs Last 24 Hrs  T(C): 37.8 (16 Oct 2021 05:50), Max: 37.8 (16 Oct 2021 05:50)  T(F): 100 (16 Oct 2021 05:50), Max: 100 (16 Oct 2021 05:50)  HR: 92 (16 Oct 2021 05:50) (75 - 103)  BP: 94/54 (16 Oct 2021 05:50) (92/58 - 105/55)  BP(mean): --  RR: 20 (16 Oct 2021 05:50) (20 - 22)  SpO2: 99% (16 Oct 2021 05:50) (95% - 100%)  CONSTITUTIONAL: Well-groomed, in no apparent distress  EYES: No conjunctival or scleral injection, non-icteric;   ENMT: No external nasal lesions; MMM  NECK: Trachea midline without palpable neck mass; thyroid not enlarged and non-tender  RESPIRATORY: Breathing comfortably; no dullness to percussion; lungs CTA without wheeze/rhonchi/rales  CARDIOVASCULAR: +S1S2, RRR, no M/G/R; pedal pulses full and symmetric; no lower extremity edema  GASTROINTESTINAL: No palpable masses or tenderness, +BS throughout, no rebound/guarding; no hepatosplenomegaly; no hernia palpated  LYMPHATIC: No cervical LAD or tenderness  SKIN: No rashes or ulcers noted  NEUROLOGIC: CN II-XII intact; sensation intact in LEs b/l to light touch  PSYCHIATRIC: A+O x 3; mood and affect appropriate; appropriate insight and judgment    LABS:                    Culture - Blood (collected 14 Oct 2021 05:35)  Source: .Blood Blood-Venous  Preliminary Report (15 Oct 2021 06:01):    No growth to date.    Culture - Blood (collected 14 Oct 2021 05:35)  Source: .Blood Blood-Venous  Preliminary Report (15 Oct 2021 06:01):    No growth to date.    Culture - Urine (collected 14 Oct 2021 03:34)  Source: Clean Catch Clean Catch (Midstream)  Final Report (15 Oct 2021 12:13):    No growth        RADIOLOGY & ADDITIONAL TESTS:  Results Reviewed:   Imaging Personally Reviewed:  Electrocardiogram Personally Reviewed:    COORDINATION OF CARE:  Care Discussed with Consultants/Other Providers [Y/N]:  Prior or Outpatient Records Reviewed [Y/N]:   PROGRESS NOTE:   Authored by Hanna Chow MD  Internal Medicine  Pager JOHNNY [phone]  Pager [phone]    Patient is a 55y old  Male who presents with a chief complaint of septic shock (15 Oct 2021 14:38)      SUBJECTIVE / OVERNIGHT EVENTS: NAEO. patient seen and examined at bedside. nonverbal at baseline, review of systems limited due to mental status. cough seems to be improved, per d/w nursing, he continues to have a lot of secretions that she is suctioning. no fevers    MEDICATIONS  (STANDING):  albuterol/ipratropium for Nebulization 3 milliLiter(s) Nebulizer every 6 hours  artificial  tears Solution 1 Drop(s) Both EYES every 12 hours  enoxaparin Injectable 40 milliGRAM(s) SubCutaneous daily  lactated ringers. 1000 milliLiter(s) (75 mL/Hr) IV Continuous <Continuous>  levETIRAcetam  Solution 500 milliGRAM(s) Oral two times a day  midodrine 10 milliGRAM(s) Oral every 8 hours  piperacillin/tazobactam IVPB.. 3.375 Gram(s) IV Intermittent every 8 hours  polyethylene glycol 3350 17 Gram(s) Oral <User Schedule>  sodium chloride 3%  Inhalation 4 milliLiter(s) Inhalation every 6 hours    MEDICATIONS  (PRN):  haloperidol    Concentrate 2 milliGRAM(s) Oral every 12 hours PRN agitation  ondansetron Injectable 4 milliGRAM(s) IV Push every 8 hours PRN Nausea and/or Vomiting  polyethylene glycol 3350 17 Gram(s) Oral daily PRN Constipation      CAPILLARY BLOOD GLUCOSE        I&O's Summary      PHYSICAL EXAM:  Vital Signs Last 24 Hrs  T(C): 37.8 (16 Oct 2021 05:50), Max: 37.8 (16 Oct 2021 05:50)  T(F): 100 (16 Oct 2021 05:50), Max: 100 (16 Oct 2021 05:50)  HR: 92 (16 Oct 2021 05:50) (75 - 103)  BP: 94/54 (16 Oct 2021 05:50) (92/58 - 105/55)  BP(mean): --  RR: 20 (16 Oct 2021 05:50) (20 - 22)  SpO2: 99% (16 Oct 2021 05:50) (95% - 100%)  CONSTITUTIONAL: comfortable, not in acute distress, cachetic  EYES: No conjunctival or scleral injection, non-icteric;   ENMT: No external nasal lesions; MMM  NECK: Trachea midline without palpable neck mass; thyroid not enlarged and non-tender  RESPIRATORY: Breathing comfortably; + cough, on 3L oxygen, lungs CTA on left side,   CARDIOVASCULAR: +S1S2, RRR, no M/G/R; pedal pulses full and symmetric; no lower extremity edema  GASTROINTESTINAL: No palpable masses or tenderness, +BS throughout, no rebound/guarding; no hepatosplenomegaly; no hernia palpated, gtube and colostomy in place  LYMPHATIC: No cervical LAD or tenderness  SKIN: No rashes or ulcers noted  NEUROLOGIC: alert, tracking as move around bed, nonverbal  PSYCHIATRIC:    LABS:                    Culture - Blood (collected 14 Oct 2021 05:35)  Source: .Blood Blood-Venous  Preliminary Report (15 Oct 2021 06:01):    No growth to date.    Culture - Blood (collected 14 Oct 2021 05:35)  Source: .Blood Blood-Venous  Preliminary Report (15 Oct 2021 06:01):    No growth to date.    Culture - Urine (collected 14 Oct 2021 03:34)  Source: Clean Catch Clean Catch (Midstream)  Final Report (15 Oct 2021 12:13):    No growth        RADIOLOGY & ADDITIONAL TESTS:  Results Reviewed:   Imaging Personally Reviewed:  Electrocardiogram Personally Reviewed:    COORDINATION OF CARE:  Care Discussed with Consultants/Other Providers [Y/N]:  Prior or Outpatient Records Reviewed [Y/N]:

## 2021-10-16 NOTE — PROGRESS NOTE ADULT - PROBLEM SELECTOR PLAN 4
- Jevity 1.5 tube feeds  - 5 cans for daily feeding, at 6 AM, 10 AM, 2 PM, 6 PM, 10 PM  - 125 cc of water before and after feeds  - Nutrition eval Home feeding schedule: Jevity 1.5 tube feeds (5 cans for daily feeding, at 6 AM, 10 AM, 2 PM, 6 PM, 10 PM/125 cc of water before and after feeds)  - Nutrition eval  - began feeds (10/15)

## 2021-10-16 NOTE — PROGRESS NOTE ADULT - PROBLEM SELECTOR PLAN 9
FEN/GI: trickle feeds, start at 10 cc, and increase by 10 to goal 30  Lines:  PPx: lovenox  Dispo: continue routine inpatient care  Code Status: FULL, pending discussion    Plan was discussed with team and Dr. Johnson on rounds.  Signed,     Hanna Chow MD  Internal Medicine PGY 1  [facility]: [id number]  [facility]: [phone] FEN/GI: trickle feeds, start at 10 cc, and increase by 10 to goal 30  Lines:  PPx: lovenox  Dispo: continue routine inpatient care  Code Status: FULL, pending discussion    Plan was discussed with team and Dr. Celaya on rounds.  Signed,     Hanna Chow MD  Internal Medicine PGY 1  [facility]: [id number]  [facility]: [phone]

## 2021-10-16 NOTE — PROGRESS NOTE ADULT - ATTENDING COMMENTS
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 Patient briefly on pressors in ED, seen by MICU and not a candidate.   Patient non-verbal, unable to participate in HPI, no aide at bedside. Lungs w/ course upper airway sounds and diffuse rhonchi. Non-productive cough seen.     #Septic shock 2/2 aspiration PNA  - Patient meets sepsis criteria w/ fever, tachycardia. Briefly required levophed gtt, now off and requiring midodrine 10mg q8h for persistently low BPs  - CXR and CT chest w/ RLL opacity consistent w/ likely aspiration event.   - Will c/w Zosyn and maintenance IVF for now   - Blood cultures/UC-NGTD   - Aggressive chest PT, pulmonary toilet for secretions   - Nutrition eval to assist w/ G tube feeds     # H/o Seizures- continue Keppra    Discussed with HS3 Dr. Chow .

## 2021-10-17 ENCOUNTER — TRANSCRIPTION ENCOUNTER (OUTPATIENT)
Age: 56
End: 2021-10-17

## 2021-10-17 LAB
ALBUMIN SERPL ELPH-MCNC: 3.1 G/DL — LOW (ref 3.3–5)
ALP SERPL-CCNC: 74 U/L — SIGNIFICANT CHANGE UP (ref 40–120)
ALT FLD-CCNC: 18 U/L — SIGNIFICANT CHANGE UP (ref 4–41)
ANION GAP SERPL CALC-SCNC: 11 MMOL/L — SIGNIFICANT CHANGE UP (ref 7–14)
AST SERPL-CCNC: 22 U/L — SIGNIFICANT CHANGE UP (ref 4–40)
BASOPHILS # BLD AUTO: 0.01 K/UL — SIGNIFICANT CHANGE UP (ref 0–0.2)
BASOPHILS NFR BLD AUTO: 0.1 % — SIGNIFICANT CHANGE UP (ref 0–2)
BILIRUB SERPL-MCNC: 0.2 MG/DL — SIGNIFICANT CHANGE UP (ref 0.2–1.2)
BUN SERPL-MCNC: 9 MG/DL — SIGNIFICANT CHANGE UP (ref 7–23)
CALCIUM SERPL-MCNC: 8.7 MG/DL — SIGNIFICANT CHANGE UP (ref 8.4–10.5)
CHLORIDE SERPL-SCNC: 102 MMOL/L — SIGNIFICANT CHANGE UP (ref 98–107)
CO2 SERPL-SCNC: 26 MMOL/L — SIGNIFICANT CHANGE UP (ref 22–31)
CREAT SERPL-MCNC: 0.45 MG/DL — LOW (ref 0.5–1.3)
EOSINOPHIL # BLD AUTO: 0 K/UL — SIGNIFICANT CHANGE UP (ref 0–0.5)
EOSINOPHIL NFR BLD AUTO: 0 % — SIGNIFICANT CHANGE UP (ref 0–6)
GLUCOSE SERPL-MCNC: 156 MG/DL — HIGH (ref 70–99)
HCT VFR BLD CALC: 36.4 % — LOW (ref 39–50)
HGB BLD-MCNC: 12.5 G/DL — LOW (ref 13–17)
IANC: 6.16 K/UL — SIGNIFICANT CHANGE UP (ref 1.5–8.5)
IMM GRANULOCYTES NFR BLD AUTO: 0.6 % — SIGNIFICANT CHANGE UP (ref 0–1.5)
LYMPHOCYTES # BLD AUTO: 0.62 K/UL — LOW (ref 1–3.3)
LYMPHOCYTES # BLD AUTO: 8.7 % — LOW (ref 13–44)
MAGNESIUM SERPL-MCNC: 2.2 MG/DL — SIGNIFICANT CHANGE UP (ref 1.6–2.6)
MCHC RBC-ENTMCNC: 31.9 PG — SIGNIFICANT CHANGE UP (ref 27–34)
MCHC RBC-ENTMCNC: 34.3 GM/DL — SIGNIFICANT CHANGE UP (ref 32–36)
MCV RBC AUTO: 92.9 FL — SIGNIFICANT CHANGE UP (ref 80–100)
MONOCYTES # BLD AUTO: 0.29 K/UL — SIGNIFICANT CHANGE UP (ref 0–0.9)
MONOCYTES NFR BLD AUTO: 4.1 % — SIGNIFICANT CHANGE UP (ref 2–14)
NEUTROPHILS # BLD AUTO: 6.16 K/UL — SIGNIFICANT CHANGE UP (ref 1.8–7.4)
NEUTROPHILS NFR BLD AUTO: 86.5 % — HIGH (ref 43–77)
NRBC # BLD: 0 /100 WBCS — SIGNIFICANT CHANGE UP
NRBC # FLD: 0 K/UL — SIGNIFICANT CHANGE UP
PHOSPHATE SERPL-MCNC: 3.7 MG/DL — SIGNIFICANT CHANGE UP (ref 2.5–4.5)
PLATELET # BLD AUTO: 143 K/UL — LOW (ref 150–400)
POTASSIUM SERPL-MCNC: 3.6 MMOL/L — SIGNIFICANT CHANGE UP (ref 3.5–5.3)
POTASSIUM SERPL-SCNC: 3.6 MMOL/L — SIGNIFICANT CHANGE UP (ref 3.5–5.3)
PROT SERPL-MCNC: 6.5 G/DL — SIGNIFICANT CHANGE UP (ref 6–8.3)
RBC # BLD: 3.92 M/UL — LOW (ref 4.2–5.8)
RBC # FLD: 13.2 % — SIGNIFICANT CHANGE UP (ref 10.3–14.5)
SODIUM SERPL-SCNC: 139 MMOL/L — SIGNIFICANT CHANGE UP (ref 135–145)
WBC # BLD: 7.12 K/UL — SIGNIFICANT CHANGE UP (ref 3.8–10.5)
WBC # FLD AUTO: 7.12 K/UL — SIGNIFICANT CHANGE UP (ref 3.8–10.5)

## 2021-10-17 PROCEDURE — 99233 SBSQ HOSP IP/OBS HIGH 50: CPT | Mod: GC

## 2021-10-17 RX ORDER — MIDODRINE HYDROCHLORIDE 2.5 MG/1
7.5 TABLET ORAL EVERY 8 HOURS
Refills: 0 | Status: DISCONTINUED | OUTPATIENT
Start: 2021-10-17 | End: 2021-10-18

## 2021-10-17 RX ADMIN — MIDODRINE HYDROCHLORIDE 7.5 MILLIGRAM(S): 2.5 TABLET ORAL at 13:28

## 2021-10-17 RX ADMIN — Medication 3 MILLILITER(S): at 04:22

## 2021-10-17 RX ADMIN — Medication 3 MILLILITER(S): at 10:38

## 2021-10-17 RX ADMIN — PIPERACILLIN AND TAZOBACTAM 25 GRAM(S): 4; .5 INJECTION, POWDER, LYOPHILIZED, FOR SOLUTION INTRAVENOUS at 22:43

## 2021-10-17 RX ADMIN — Medication 1 DROP(S): at 17:28

## 2021-10-17 RX ADMIN — PIPERACILLIN AND TAZOBACTAM 25 GRAM(S): 4; .5 INJECTION, POWDER, LYOPHILIZED, FOR SOLUTION INTRAVENOUS at 13:26

## 2021-10-17 RX ADMIN — Medication 1 DROP(S): at 05:54

## 2021-10-17 RX ADMIN — MIDODRINE HYDROCHLORIDE 7.5 MILLIGRAM(S): 2.5 TABLET ORAL at 22:43

## 2021-10-17 RX ADMIN — PIPERACILLIN AND TAZOBACTAM 25 GRAM(S): 4; .5 INJECTION, POWDER, LYOPHILIZED, FOR SOLUTION INTRAVENOUS at 05:54

## 2021-10-17 RX ADMIN — LEVETIRACETAM 500 MILLIGRAM(S): 250 TABLET, FILM COATED ORAL at 05:54

## 2021-10-17 RX ADMIN — MIDODRINE HYDROCHLORIDE 10 MILLIGRAM(S): 2.5 TABLET ORAL at 05:54

## 2021-10-17 RX ADMIN — LEVETIRACETAM 500 MILLIGRAM(S): 250 TABLET, FILM COATED ORAL at 17:28

## 2021-10-17 RX ADMIN — ENOXAPARIN SODIUM 40 MILLIGRAM(S): 100 INJECTION SUBCUTANEOUS at 13:26

## 2021-10-17 NOTE — DISCHARGE NOTE PROVIDER - NSDCMRMEDTOKEN_GEN_ALL_CORE_FT
Artificial Tears ophthalmic solution: 1 application to each affected eye every 12 hours  benztropine 0.5 mg oral tablet: 1 tab(s) orally 2 times a day  haloperidol 2 mg/mL oral concentrate: 1 millilit  haloperidol 2 mg/mL oral concentrate: 1 milliliter(s) orally once a day (in the morning)  Keppra 100 mg/mL oral solution: 5 milliliter(s) orally 2 times a day  MiraLax oral powder for reconstitution: 1 packet(s) orally 2 times a week  Pazeo 0.7% ophthalmic solution: 1 drop(s) to each affected eye 2 times a day  tamsulosin: 0.5 cap(s) orally once a day   Artificial Tears ophthalmic solution: 1 application to each affected eye every 12 hours  benztropine 0.5 mg oral tablet: 1 tab(s) orally 2 times a day  haloperidol 2 mg/mL oral concentrate: 0.5 milliliter(s) orally   haloperidol 2 mg/mL oral concentrate: 0.25 milliliter(s) orally   Keppra 100 mg/mL oral solution: 5 milliliter(s) orally 2 times a day  MiraLax oral powder for reconstitution: 1 packet(s) orally 2 times a week  Pazeo 0.7% ophthalmic solution: 1 drop(s) to each affected eye 2 times a day   Artificial Tears ophthalmic solution: 1 application to each affected eye every 12 hours  benztropine 0.5 mg oral tablet: 1 tab(s) orally 2 times a day  haloperidol 2 mg/mL oral concentrate: 0.5 milligram(s) by gastrostomy tube once a day at 8am.  haloperidol 2 mg/mL oral concentrate: 1 milligram(s) by gastrostomy tube once a day at 8pm  Keppra 100 mg/mL oral solution: 5 milliliter(s) by gastrostomy tube 2 times a day  MiraLax oral powder for reconstitution: 1 packet(s) orally 2 times a week Monday and Thursday at 7pm.  nystatin 100,000 units/g topical powder: 1 application topically every 12 hours to L heel until heel improves.   Artificial Tears ophthalmic solution: 1 application to each affected eye every 12 hours  benztropine 0.5 mg oral tablet: 1 tab(s) orally 2 times a day  erythromycin 0.5% ophthalmic ointment: 1 application to right eye every 6 hours for 7 days- through 11/16/21.  haloperidol 2 mg/mL oral concentrate: 0.5 milligram(s) by gastrostomy tube once a day at 8am.  haloperidol 2 mg/mL oral concentrate: 1 milligram(s) by gastrostomy tube once a day at 8pm  Keppra 100 mg/mL oral solution: 5 milliliter(s) by gastrostomy tube 2 times a day  MiraLax oral powder for reconstitution: 1 packet(s) orally 2 times a week Monday and Thursday at 7pm.  nystatin 100,000 units/g topical powder: 1 application topically every 12 hours to L heel until heel improves.

## 2021-10-17 NOTE — DISCHARGE NOTE PROVIDER - CARE PROVIDER_API CALL
[facility] [place],   [address] [place] [address]  Phone: [phone]  Fax: (   )    -  Follow Up Time: 1 week

## 2021-10-17 NOTE — DISCHARGE NOTE PROVIDER - PROVIDER TOKENS
FREE:[LAST:[[facility] [place]],PHONE:[phone]],FAX:[(   )    -],ADDRESS:[[address] [place] [address]],FOLLOWUP:[1 week]]

## 2021-10-17 NOTE — PROGRESS NOTE ADULT - PROBLEM SELECTOR PLAN 9
FEN/GI: trickle feeds, start at 10 cc, and increase by 10 to goal 30  Lines:  PPx: lovenox  Dispo: continue routine inpatient care  Code Status: FULL, pending discussion    Plan was discussed with team and Dr. Celaya on rounds.  Signed,     Hanna Chow MD  Internal Medicine PGY 1  [facility]: [id number]  [facility]: [phone]

## 2021-10-17 NOTE — DISCHARGE NOTE PROVIDER - NSDCCPTREATMENT_GEN_ALL_CORE_FT
PRINCIPAL PROCEDURE  Procedure: CT abdomen and pelvis w contrast  Findings and Treatment: FINDINGS:  LOWER CHEST: Right lower lobe consolidation and patchy airspace opacity in the right m  < end of copied text >  iddle lobe. Trace right pleural effusion.  LIVER: Within normal limits.  BILE DUCTS: Normal caliber.  GALLBLADDER: Within normal limits.  SPLEEN: Within normal limits.  PANCREAS: Within normal limits.  ADRENALS: Within normal limits.  KIDNEYS/URETERS: Kidneys enhance symmetrically without hydronephrosis. Subcentimeter low-attenuation lesions in both kidneys which are too small to characterize.  BLADDER: Mild thickening of the urinary bladder walls likely due to underdistention.  REPRODUCTIVE ORGANS: Prostate gland and seminal vesicles are unremarkable.  BOWEL: Gastrostomy tube within the distal body of the stomach. Left lower quadrant colostomy. No bowel obstruction. Appendix is normal. No overt bowel wall thickening.  PERITONEUM: No ascites, pneumoperitoneum, or loculated collection.  VESSELS: Within normal limits.  RETROPERITONEUM/LYMPH NODES: No lymphadenopathy.  ABDOMINAL WALL: Gastrostomy tube and left lower quadrant colostomy.  BONES: Degenerative changes of the spine.  IMPRESSION:  No bowel obstruction or evidence of bowel inflammation. Gastrostomy tube in place.  Right lower lobe consolidation and patchy airspace opacity in the right middle lobe consistent with pneumonia. Trace right pleural effusion.< from: CT Abdomen and Pelvis w/ IV Cont (10.14.21 @ 07:20) >

## 2021-10-17 NOTE — PROGRESS NOTE ADULT - PROBLEM SELECTOR PLAN 4
Home feeding schedule: Jevity 1.5 tube feeds (5 cans for daily feeding, at 6 AM, 10 AM, 2 PM, 6 PM, 10 PM/125 cc of water before and after feeds)  - Nutrition eval  - began feeds (10/15) Home feeding schedule: Jevity 1.5 tube feeds (5 cans for daily feeding, at 6 AM, 10 AM, 2 PM, 6 PM, 10 PM/125 cc of water before and after feeds)  - Nutrition eval appreciated  - began feeds (10/15)

## 2021-10-17 NOTE — PROGRESS NOTE ADULT - ATTENDING COMMENTS
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 Patient briefly on pressors in ED, seen by MICU and not a candidate.     Patient non-verbal, unable to participate in HPI, no aide at bedside.   Lungs w/ course upper airway sounds and diffuse rhonchi. Non-productive cough seen.     #Septic shock 2/2 aspiration PNA  - Patient meets sepsis criteria w/ fever, tachycardia.   -Briefly required levophed gtt, now off and requiring midodrine 10mg q8h for persistently low BPs  - CXR and CT chest w/ RLL opacity consistent w/ likely aspiration event.   - Will c/w Zosyn and maintenance IVF for now   - Blood cultures/UC-NGTD   - Aggressive chest PT, pulmonary toilet for secretions   - Nutrition eval to assist w/ G tube feeds   - Supplemental Oxygen decreased to 2 L     # H/o Seizures- continue Keppra    Discussed with HS3 Dr. 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 Patient briefly on pressors in ED, seen by MICU and not a candidate.     Patient non-verbal, unable to participate in HPI, no aide at bedside.   Lungs w/ course upper airway sounds and diffuse rhonchi. Non-productive cough seen.     #Septic shock 2/2 aspiration PNA  - Patient meets sepsis criteria w/ fever, tachycardia.   -Briefly required levophed gtt, now off and requiring midodrine 10mg q8h for persistently low BPs  - Will decrease to Midodrine 7.5 mg Q 8 hours as SBP in 100s  - CXR and CT chest w/ RLL opacity consistent w/ likely aspiration event.   - Will c/w Zosyn and maintenance IVF for now   - Blood cultures/UC-NGTD   - Aggressive chest PT, pulmonary toilet for secretions   - Nutrition eval to assist w/ G tube feeds   - Supplemental Oxygen decreased to 2 L     # H/o Seizures- continue Keppra    Discussed with HS3 Dr. Waters

## 2021-10-17 NOTE — DISCHARGE NOTE PROVIDER - DETAILS OF MALNUTRITION DIAGNOSIS/DIAGNOSES
This patient has been assessed with a concern for Malnutrition and was treated during this hospitalization for the following Nutrition diagnosis/diagnoses:     -  10/15/2021: Severe protein-calorie malnutrition

## 2021-10-17 NOTE — DISCHARGE NOTE PROVIDER - HOSPITAL COURSE
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 Found to have septic shock from aspiration pneumonia. On arrival to the ED, febrile to 101.7, , /66, RR22 satting 85% on RA. Blood pressure decreased to 78/53, fluid resuscitated with 4L and started on levophed. WBC with 11.05 with neutrophilic predominance. CXR with right lung airspace opacity. CTAP with RLL consolidation and patchy airspace opacity in RML. MICU was consulted, not a candidate for MICU admission. Weaned off levo to midodrine 20mg, with appropriate response. Blood pressure maintained on midodrine, eventually weaned. Patient started on zosyn x7 days. Patient underwent aggressive pulmonary toilet, with CPT and gradually weaned back to room air.  Tube feeds were initially held then gradually resumed.     At the time of discharge, patient was medically stable for discharge back to group home. 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 Found to have septic shock from aspiration pneumonia. On arrival to the ED, febrile to 101.7, , /66, RR22 satting 85% on RA. Blood pressure decreased to 78/53, fluid resuscitated with 4L and started on levophed. WBC with 11.05 with neutrophilic predominance. CXR with right lung airspace opacity. CTAP with RLL consolidation and patchy airspace opacity in RML. MICU was consulted, not a candidate for MICU admission. Weaned off levo to midodrine 20mg, with appropriate response. Blood pressure maintained on midodrine, eventually weaned. Patient started on zosyn x7 days. Patient underwent aggressive pulmonary toilet, with Chest PT and gradually weaned back to room air.  Tube feeds were initially held then gradually resumed.     At the time of discharge, patient was medically stable for discharge back to group home. 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 Found to have septic shock from aspiration pneumonia. On arrival to the ED, febrile to 101.7, , /66, RR22 satting 85% on RA. Blood pressure decreased to 78/53, fluid resuscitated with 4L and started on levophed. WBC with 11.05 with neutrophilic predominance. CXR with right lung airspace opacity. CTAP with RLL consolidation and patchy airspace opacity in RML. MICU was consulted, not a candidate for MICU admission. Weaned off levo to midodrine 20mg, with appropriate response. Blood pressure maintained on midodrine, eventually weaned. Patient started on zosyn x7 days. Patient underwent aggressive pulmonary toilet, with Chest PT and gradually weaned back to room air.  Tube feeds were initially held then gradually resumed.     Patient was not able to be discharged back to his group home as his functional status would not permit him to walk to his room on the second floor. Patient was found new placement at ____ and at the time of discharge was medically stable.      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 Found to have septic shock from aspiration pneumonia. On arrival to the ED, febrile to 101.7, , /66, RR22 satting 85% on RA. Blood pressure decreased to 78/53, fluid resuscitated with 4L and started on levophed. WBC with 11.05 with neutrophilic predominance. CXR with right lung airspace opacity. CTAP with RLL consolidation and patchy airspace opacity in RML. MICU was consulted, not a candidate for MICU admission. Weaned off levo to midodrine 20mg, with appropriate response. Blood pressure maintained on midodrine, eventually weaned. Patient started on zosyn x7 days. Patient underwent aggressive pulmonary toilet, with Chest PT and gradually weaned back to room air.  Tube feeds were initially held then gradually resumed. Patient had a second episode of aspiration pneumonia during hospitalization and was treated with a five day course of Zosyn.     Patient was not able to be discharged back to his group home as his functional status would not permit him to walk to his room on the second floor. Patient was found new placement at ____ and at the time of discharge was medically stable.      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 Found to have septic shock from aspiration pneumonia. On arrival to the ED, febrile to 101.7, , /66, RR22 satting 85% on RA. Blood pressure decreased to 78/53, fluid resuscitated with 4L and started on levophed. WBC with 11.05 with neutrophilic predominance. CXR with right lung airspace opacity. CTAP with RLL consolidation and patchy airspace opacity in RML. MICU was consulted, not a candidate for MICU admission. Weaned off levo to midodrine 20mg, with appropriate response. Blood pressure maintained on midodrine, eventually weaned. Patient started on zosyn x7 days. Patient underwent aggressive pulmonary toilet, with Chest PT and gradually weaned back to room air.  Tube feeds were initially held then gradually resumed. Patient had a second episode of aspiration pneumonia during hospitalization and was treated with a five day course of Zosyn.     Patient was not able to be discharged back to his group home as his functional status would not permit him to walk to his room on the second floor. Patient was found new placement and at the time of discharge was medically stable.      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 Found to have septic shock from aspiration pneumonia. On arrival to the ED, febrile to 101.7, , /66, RR22 satting 85% on RA. Blood pressure decreased to 78/53, fluid resuscitated with 4L and started on levophed. WBC with 11.05 with neutrophilic predominance. CXR with right lung airspace opacity. CTAP with RLL consolidation and patchy airspace opacity in RML. MICU was consulted, not a candidate for MICU admission. Weaned off levo to midodrine 20mg, with appropriate response. Blood pressure maintained on midodrine, eventually weaned. Patient started on zosyn x7 days. Patient underwent aggressive pulmonary toilet, with Chest PT and gradually weaned back to room air.  Tube feeds were initially held then gradually resumed. Patient had a second episode of aspiration pneumonia during hospitalization and was treated with a five day course of Zosyn.     Patient was not able to be discharged back to his group home as his functional status would not permit him to walk to his room on the second floor. Right eye noted to have purulent drainage on 11/10/21. Started on erythromycin ointment for conjunctivitis. Patient was found new placement and at the time of discharge was medically stable.

## 2021-10-17 NOTE — PROGRESS NOTE ADULT - SUBJECTIVE AND OBJECTIVE BOX
Patient is a 55y old  Male who presents with a chief complaint of septic shock (16 Oct 2021 07:16)      Daniel Waters  EM/IM PGY4    SUBJECTIVE / OVERNIGHT EVENTS:    MEDICATIONS  (STANDING):  albuterol/ipratropium for Nebulization 3 milliLiter(s) Nebulizer every 6 hours  artificial  tears Solution 1 Drop(s) Both EYES every 12 hours  enoxaparin Injectable 40 milliGRAM(s) SubCutaneous daily  lactated ringers. 1000 milliLiter(s) (75 mL/Hr) IV Continuous <Continuous>  levETIRAcetam  Solution 500 milliGRAM(s) Oral two times a day  midodrine 10 milliGRAM(s) Oral every 8 hours  piperacillin/tazobactam IVPB.. 3.375 Gram(s) IV Intermittent every 8 hours  polyethylene glycol 3350 17 Gram(s) Oral <User Schedule>    MEDICATIONS  (PRN):  haloperidol    Concentrate 2 milliGRAM(s) Oral every 12 hours PRN agitation  ondansetron Injectable 4 milliGRAM(s) IV Push every 8 hours PRN Nausea and/or Vomiting  polyethylene glycol 3350 17 Gram(s) Oral daily PRN Constipation      Vital Signs Last 24 Hrs  T(C): 37 (17 Oct 2021 05:52), Max: 37.3 (16 Oct 2021 13:10)  T(F): 98.6 (17 Oct 2021 05:52), Max: 99.1 (16 Oct 2021 13:10)  HR: 86 (17 Oct 2021 05:52) (82 - 95)  BP: 104/62 (17 Oct 2021 05:52) (102/55 - 120/77)  BP(mean): --  RR: 20 (17 Oct 2021 05:52) (19 - 20)  SpO2: 98% (17 Oct 2021 05:52) (96% - 99%)  CAPILLARY BLOOD GLUCOSE        I&O's Summary      PHYSICAL EXAM:  CONSTITUTIONAL: comfortable, not in acute distress, cachetic  EYES: No conjunctival or scleral injection, non-icteric;   ENMT: No external nasal lesions; MMM  NECK: Trachea midline without palpable neck mass; thyroid not enlarged and non-tender  RESPIRATORY: Breathing comfortably; + cough, on 3L oxygen, lungs CTA on left side,   CARDIOVASCULAR: +S1S2, RRR, no M/G/R; pedal pulses full and symmetric; no lower extremity edema  GASTROINTESTINAL: No palpable masses or tenderness, +BS throughout, no rebound/guarding; no hepatosplenomegaly; no hernia palpated, gtube and colostomy in place  LYMPHATIC: No cervical LAD or tenderness  SKIN: No rashes or ulcers noted  NEUROLOGIC: alert, tracking as move around bed, nonverbal  SKIN: No rashes or lesions    LABS:                        12.5   7.12  )-----------( 143      ( 17 Oct 2021 07:23 )             36.4     10-17    139  |  102  |  9   ----------------------------<  156<H>  3.6   |  26  |  0.45<L>    Ca    8.7      17 Oct 2021 07:23  Phos  2.9     10-16  Mg     2.20     10-17    TPro  6.5  /  Alb  3.1<L>  /  TBili  0.2  /  DBili  x   /  AST  22  /  ALT  18  /  AlkPhos  74  10-17              RADIOLOGY & ADDITIONAL TESTS: Reviewed. Patient is a 55y old  Male who presents with a chief complaint of septic shock (16 Oct 2021 07:16)      Daniel Waters  EM/IM PGY4    SUBJECTIVE / OVERNIGHT EVENTS: No acute events overnight. Patient seen and examined at bedside. nonverbal at baseline, review of systems limited due to mental status. No fevers.    MEDICATIONS  (STANDING):  albuterol/ipratropium for Nebulization 3 milliLiter(s) Nebulizer every 6 hours  artificial  tears Solution 1 Drop(s) Both EYES every 12 hours  enoxaparin Injectable 40 milliGRAM(s) SubCutaneous daily  lactated ringers. 1000 milliLiter(s) (75 mL/Hr) IV Continuous <Continuous>  levETIRAcetam  Solution 500 milliGRAM(s) Oral two times a day  midodrine 10 milliGRAM(s) Oral every 8 hours  piperacillin/tazobactam IVPB.. 3.375 Gram(s) IV Intermittent every 8 hours  polyethylene glycol 3350 17 Gram(s) Oral <User Schedule>    MEDICATIONS  (PRN):  haloperidol    Concentrate 2 milliGRAM(s) Oral every 12 hours PRN agitation  ondansetron Injectable 4 milliGRAM(s) IV Push every 8 hours PRN Nausea and/or Vomiting  polyethylene glycol 3350 17 Gram(s) Oral daily PRN Constipation      Vital Signs Last 24 Hrs  T(C): 37 (17 Oct 2021 05:52), Max: 37.3 (16 Oct 2021 13:10)  T(F): 98.6 (17 Oct 2021 05:52), Max: 99.1 (16 Oct 2021 13:10)  HR: 86 (17 Oct 2021 05:52) (82 - 95)  BP: 104/62 (17 Oct 2021 05:52) (102/55 - 120/77)  BP(mean): --  RR: 20 (17 Oct 2021 05:52) (19 - 20)  SpO2: 98% (17 Oct 2021 05:52) (96% - 99%)  CAPILLARY BLOOD GLUCOSE        I&O's Summary      PHYSICAL EXAM:  CONSTITUTIONAL: comfortable, not in acute distress, cachetic  EYES: No conjunctival or scleral injection, non-icteric;   ENMT: No external nasal lesions; MMM  NECK: Trachea midline without palpable neck mass; thyroid not enlarged and non-tender  RESPIRATORY: Breathing comfortably; + cough, on 3L oxygen, lungs CTA on left side,   CARDIOVASCULAR: +S1S2, RRR, no M/G/R; pedal pulses full and symmetric; no lower extremity edema  GASTROINTESTINAL: No palpable masses or tenderness, +BS throughout, no rebound/guarding; no hepatosplenomegaly; no hernia palpated, gtube and colostomy in place  LYMPHATIC: No cervical LAD or tenderness  SKIN: No rashes or ulcers noted  NEUROLOGIC: alert, tracking as move around bed, nonverbal  SKIN: No rashes or lesions    LABS:                        12.5   7.12  )-----------( 143      ( 17 Oct 2021 07:23 )             36.4     10-17    139  |  102  |  9   ----------------------------<  156<H>  3.6   |  26  |  0.45<L>    Ca    8.7      17 Oct 2021 07:23  Phos  2.9     10-16  Mg     2.20     10-17    TPro  6.5  /  Alb  3.1<L>  /  TBili  0.2  /  DBili  x   /  AST  22  /  ALT  18  /  AlkPhos  74  10-17              RADIOLOGY & ADDITIONAL TESTS: Reviewed.

## 2021-10-17 NOTE — PROGRESS NOTE ADULT - PROBLEM SELECTOR PLAN 1
- SIRS+: fever, leukocytosis, tachycardia, tachypnea, on 5L oxygen, CTAP with RLL consolidation s/p aspiration event. Hypotensive MAP <65 requiring levophed, now weaned. received fluid resuscitation and 20 mg midodrine in ED with appropriate response  - POCUS in ED with a-line predominance in anterior lung fields bilaterally. hepatization of right lung, B lines at right lower hemithorax, with air bronchograms. No pleural effusion appreciated  - received vanc and zosyn in ED  - MICU consulted, not a candidate at this time  - continue zosyn x 7days  - midodrine 10 q8, consider weaning once blood pressures improve. per MICU, can go up to 30 mg midodrine TID  - IVF for 12 hours  - nutrition consult given aspiration event and optimizing tube feeds  - maintain MAP >65  - blood cultures and ucx  - vitals and pulse ox q4 - SIRS+: fever, leukocytosis, tachycardia, tachypnea, on 5L oxygen, CTAP with RLL consolidation s/p aspiration event. Hypotensive MAP <65 requiring levophed, now weaned. received fluid resuscitation and 20 mg midodrine in ED with appropriate response  - POCUS in ED with a-line predominance in anterior lung fields bilaterally. hepatization of right lung, B lines at right lower hemithorax, with air bronchograms. No pleural effusion appreciated  - received vanc and zosyn in ED  - MICU consulted, not a candidate at this time  - continue zosyn x 7days  - midodrine 10 q8 weaned to 7.5mg q8h on 10/17, will continue to wean as tolerated  - nutrition consult appreciated  - maintain MAP >65  - blood cultures and ucx  - vitals and pulse ox q4

## 2021-10-17 NOTE — PROGRESS NOTE ADULT - PROBLEM SELECTOR PLAN 2
- presented with cough, fever and new oxygen requirement after observed aspiration event from g-tube feeds  - CTAP with alveolar opacities   - continue zosyn x7days (10/14-10/21)  - CPT, duonebs q6, hypertonic saline  - wean oxygen as tolerated

## 2021-10-17 NOTE — DISCHARGE NOTE PROVIDER - NSDCCPCAREPLAN_GEN_ALL_CORE_FT
PRINCIPAL DISCHARGE DIAGNOSIS  Diagnosis: Pneumonia, aspiration  Assessment and Plan of Treatment: You came to the hospital because you were observed to have an episode where you were throwing up your g-tube feeds and coughing/inhaling your feeds, called aspiration. You required oxygen and medications to increase your blood pressure because you were sick. You were not admitted to the MICU. You were slowly weaned off the medications to increase your blood pressure, and off of oxygen as well. You got 7 days of IV antibiotics with improvement. Your g-tube feeds were initially held, but then slowly increased. The registered dietician was consulted to help optimize your nutrition requirement. Please return to the hospital if you have fevers, chills, cough, shortness of breath, trouble breathing, another observed aspiration event.       PRINCIPAL DISCHARGE DIAGNOSIS  Diagnosis: Pneumonia, aspiration  Assessment and Plan of Treatment: You came to the hospital because you were observed to have an episode where you were throwing up your g-tube feeds and coughing/inhaling your feeds, called aspiration. You required oxygen and medications to increase your blood pressure because you were sick. You were not admitted to the MICU. You were slowly weaned off the medications to increase your blood pressure, and off of oxygen as well. You got 7 days of IV antibiotics with improvement. Your g-tube feeds were initially held, but then slowly increased. The registered dietician was consulted to help optimize your nutrition requirement. Please return to the hospital if you have fevers, chills, cough, shortness of breath, trouble breathing, another observed aspiration event.  Please suction Danie as needed.      SECONDARY DISCHARGE DIAGNOSES  Diagnosis: G tube feedings  Assessment and Plan of Treatment: Jevity 1.2 yousuf  Total daily volume: 1200mL  300mL every 6 hours, starting at 11am daily.  300mL free water every 6 hours via PEG tube.    Diagnosis: Wound finding  Assessment and Plan of Treatment: Danie has a moist left heel wound that has been improving. Please apply nystatin powder twice a day until resolves.     PRINCIPAL DISCHARGE DIAGNOSIS  Diagnosis: Pneumonia, aspiration  Assessment and Plan of Treatment: You came to the hospital because you were observed to have an episode where you were throwing up your g-tube feeds and coughing/inhaling your feeds, called aspiration. You required oxygen and medications to increase your blood pressure because you were sick. You were not admitted to the MICU. You were slowly weaned off the medications to increase your blood pressure, and off of oxygen as well. You got 7 days of IV antibiotics with improvement. Your g-tube feeds were initially held, but then slowly increased. The registered dietician was consulted to help optimize your nutrition requirement. Please return to the hospital if you have fevers, chills, cough, shortness of breath, trouble breathing, another observed aspiration event.  Please suction Danie as needed.      SECONDARY DISCHARGE DIAGNOSES  Diagnosis: G tube feedings  Assessment and Plan of Treatment: Jevity 1.2 yousuf  Total daily volume: 1200mL  300mL every 6 hours, starting at 11am daily.  300mL free water every 6 hours via PEG tube.    Diagnosis: Wound finding  Assessment and Plan of Treatment: Danie has a moist left heel wound that has been improving. Please apply nystatin powder twice a day until resolves.    Diagnosis: Conjunctivitis  Assessment and Plan of Treatment: Danie was noted to have drainage from right eye on 11/10/21. Started on erythromycin ointment- 1 ribbon on right lower eyelid every 6 hours through 11/16/2021.

## 2021-10-17 NOTE — DISCHARGE NOTE PROVIDER - NSFOLLOWUPCLINICS_GEN_ALL_ED_FT
[facility]  Internal Medicine  [address] [place] [address]  Phone: [phone]  Fax: [phone]  Follow Up Time: 1 week

## 2021-10-18 DIAGNOSIS — I95.9 HYPOTENSION, UNSPECIFIED: ICD-10-CM

## 2021-10-18 LAB
ANION GAP SERPL CALC-SCNC: 12 MMOL/L — SIGNIFICANT CHANGE UP (ref 7–14)
BASOPHILS # BLD AUTO: 0.01 K/UL — SIGNIFICANT CHANGE UP (ref 0–0.2)
BASOPHILS NFR BLD AUTO: 0.1 % — SIGNIFICANT CHANGE UP (ref 0–2)
BUN SERPL-MCNC: 12 MG/DL — SIGNIFICANT CHANGE UP (ref 7–23)
CALCIUM SERPL-MCNC: 9.1 MG/DL — SIGNIFICANT CHANGE UP (ref 8.4–10.5)
CHLORIDE SERPL-SCNC: 98 MMOL/L — SIGNIFICANT CHANGE UP (ref 98–107)
CO2 SERPL-SCNC: 27 MMOL/L — SIGNIFICANT CHANGE UP (ref 22–31)
CREAT SERPL-MCNC: 0.5 MG/DL — SIGNIFICANT CHANGE UP (ref 0.5–1.3)
EOSINOPHIL # BLD AUTO: 0.02 K/UL — SIGNIFICANT CHANGE UP (ref 0–0.5)
EOSINOPHIL NFR BLD AUTO: 0.2 % — SIGNIFICANT CHANGE UP (ref 0–6)
GLUCOSE SERPL-MCNC: 133 MG/DL — HIGH (ref 70–99)
HCT VFR BLD CALC: 38.7 % — LOW (ref 39–50)
HGB BLD-MCNC: 13.1 G/DL — SIGNIFICANT CHANGE UP (ref 13–17)
IANC: 6.9 K/UL — SIGNIFICANT CHANGE UP (ref 1.5–8.5)
IMM GRANULOCYTES NFR BLD AUTO: 0.5 % — SIGNIFICANT CHANGE UP (ref 0–1.5)
LYMPHOCYTES # BLD AUTO: 0.93 K/UL — LOW (ref 1–3.3)
LYMPHOCYTES # BLD AUTO: 11.2 % — LOW (ref 13–44)
MAGNESIUM SERPL-MCNC: 2.3 MG/DL — SIGNIFICANT CHANGE UP (ref 1.6–2.6)
MCHC RBC-ENTMCNC: 31.1 PG — SIGNIFICANT CHANGE UP (ref 27–34)
MCHC RBC-ENTMCNC: 33.9 GM/DL — SIGNIFICANT CHANGE UP (ref 32–36)
MCV RBC AUTO: 91.9 FL — SIGNIFICANT CHANGE UP (ref 80–100)
MONOCYTES # BLD AUTO: 0.43 K/UL — SIGNIFICANT CHANGE UP (ref 0–0.9)
MONOCYTES NFR BLD AUTO: 5.2 % — SIGNIFICANT CHANGE UP (ref 2–14)
NEUTROPHILS # BLD AUTO: 6.9 K/UL — SIGNIFICANT CHANGE UP (ref 1.8–7.4)
NEUTROPHILS NFR BLD AUTO: 82.8 % — HIGH (ref 43–77)
NRBC # BLD: 0 /100 WBCS — SIGNIFICANT CHANGE UP
NRBC # FLD: 0 K/UL — SIGNIFICANT CHANGE UP
PHOSPHATE SERPL-MCNC: 3.8 MG/DL — SIGNIFICANT CHANGE UP (ref 2.5–4.5)
PLATELET # BLD AUTO: 187 K/UL — SIGNIFICANT CHANGE UP (ref 150–400)
POTASSIUM SERPL-MCNC: 4 MMOL/L — SIGNIFICANT CHANGE UP (ref 3.5–5.3)
POTASSIUM SERPL-SCNC: 4 MMOL/L — SIGNIFICANT CHANGE UP (ref 3.5–5.3)
RBC # BLD: 4.21 M/UL — SIGNIFICANT CHANGE UP (ref 4.2–5.8)
RBC # FLD: 13.2 % — SIGNIFICANT CHANGE UP (ref 10.3–14.5)
SODIUM SERPL-SCNC: 137 MMOL/L — SIGNIFICANT CHANGE UP (ref 135–145)
WBC # BLD: 8.33 K/UL — SIGNIFICANT CHANGE UP (ref 3.8–10.5)
WBC # FLD AUTO: 8.33 K/UL — SIGNIFICANT CHANGE UP (ref 3.8–10.5)

## 2021-10-18 PROCEDURE — 99232 SBSQ HOSP IP/OBS MODERATE 35: CPT | Mod: GC

## 2021-10-18 RX ORDER — MIDODRINE HYDROCHLORIDE 2.5 MG/1
5 TABLET ORAL EVERY 8 HOURS
Refills: 0 | Status: DISCONTINUED | OUTPATIENT
Start: 2021-10-18 | End: 2021-10-19

## 2021-10-18 RX ORDER — MIDODRINE HYDROCHLORIDE 2.5 MG/1
5 TABLET ORAL EVERY 8 HOURS
Refills: 0 | Status: DISCONTINUED | OUTPATIENT
Start: 2021-10-18 | End: 2021-10-18

## 2021-10-18 RX ADMIN — PIPERACILLIN AND TAZOBACTAM 25 GRAM(S): 4; .5 INJECTION, POWDER, LYOPHILIZED, FOR SOLUTION INTRAVENOUS at 13:11

## 2021-10-18 RX ADMIN — MIDODRINE HYDROCHLORIDE 7.5 MILLIGRAM(S): 2.5 TABLET ORAL at 05:40

## 2021-10-18 RX ADMIN — ENOXAPARIN SODIUM 40 MILLIGRAM(S): 100 INJECTION SUBCUTANEOUS at 12:33

## 2021-10-18 RX ADMIN — PIPERACILLIN AND TAZOBACTAM 25 GRAM(S): 4; .5 INJECTION, POWDER, LYOPHILIZED, FOR SOLUTION INTRAVENOUS at 05:40

## 2021-10-18 RX ADMIN — LEVETIRACETAM 500 MILLIGRAM(S): 250 TABLET, FILM COATED ORAL at 17:10

## 2021-10-18 RX ADMIN — LEVETIRACETAM 500 MILLIGRAM(S): 250 TABLET, FILM COATED ORAL at 05:40

## 2021-10-18 RX ADMIN — Medication 1 DROP(S): at 17:09

## 2021-10-18 RX ADMIN — POLYETHYLENE GLYCOL 3350 17 GRAM(S): 17 POWDER, FOR SOLUTION ORAL at 17:10

## 2021-10-18 RX ADMIN — PIPERACILLIN AND TAZOBACTAM 25 GRAM(S): 4; .5 INJECTION, POWDER, LYOPHILIZED, FOR SOLUTION INTRAVENOUS at 21:34

## 2021-10-18 RX ADMIN — MIDODRINE HYDROCHLORIDE 5 MILLIGRAM(S): 2.5 TABLET ORAL at 13:24

## 2021-10-18 RX ADMIN — Medication 1 DROP(S): at 05:41

## 2021-10-18 NOTE — PROGRESS NOTE ADULT - PROBLEM SELECTOR PLAN 4
Home feeding schedule: Jevity 1.5 tube feeds (5 cans for daily feeding, at 6 AM, 10 AM, 2 PM, 6 PM, 10 PM/125 cc of water before and after feeds)  - Nutrition eval appreciated  - began feeds (10/15) Home feeding schedule: Jevity 1.5 tube feeds (5 cans for daily feeding, at 6 AM, 10 AM, 2 PM, 6 PM, 10 PM/125 cc of water before and after feeds)  - Nutrition eval appreciated- now at goal feeds per nutrition

## 2021-10-18 NOTE — PROGRESS NOTE ADULT - ASSESSMENT
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

## 2021-10-18 NOTE — PROGRESS NOTE ADULT - ATTENDING COMMENTS
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 Patient briefly on pressors in ED, seen by MICU and not a candidate.     #Septic shock 2/2 aspiration PNA: sepsis criteria on admission with fever (101.7)  tachycardia (117) RR 26 required levophed gtt in ED  - decrease to Midodrine 5 mg q8h prn SBP <100; Rlower lobe consolidation and patchy airspace opacity in the right middle lobe. Trace right pleural effusion.  - c/w zosyn, D5  - Aggressive chest PT, pulmonary toilet for secretions   - Nutrition eval to assist w/ G tube feeds   - Supplemental Oxygen decreased RA    # Acute hypoxic respiratory failure 2/2 aspiration: required up to 4L  - downtitrated, trial on RA    # Seizure disorder: continue Keppra    Tolerating TFs at goal rate, +UOP and ostomy output.  Dispo planning

## 2021-10-18 NOTE — PROGRESS NOTE ADULT - PROBLEM SELECTOR PLAN 2
- presented with cough, fever and new oxygen requirement after observed aspiration event from g-tube feeds  - CTAP with alveolar opacities   - continue zosyn x7days (10/14-10/21)  - CPT, duonebs q6, hypertonic saline  - wean oxygen as tolerated Patient hypotensive with MAP <65 on admission  - midodrine 10mg q8 weaned to 7.5mg q8 on 10/17 weaned to 5mg q8 for SBP <100 on 10/18  - maintain MAP >65

## 2021-10-18 NOTE — PROGRESS NOTE ADULT - PROBLEM SELECTOR PLAN 9
FEN/GI: trickle feeds, start at 10 cc, and increase by 10 to goal 30  Lines:  PPx: lovenox  Dispo: continue routine inpatient care  Code Status: FULL, pending discussion    Plan was discussed with team and Dr. Celaya on rounds.  Signed,     Hanna Chow MD  Internal Medicine PGY 1  [facility]: [id number]  [facility]: [phone] FEN/GI: trickle feeds, start at 10 cc, and increase by 10 to goal 30  Lines:  PPx: lovenox  Dispo: continue routine inpatient care  Code Status: FULL, pending discussion FEN/GI: tube feeds   PPx: lovenox  Dispo: back to group home when medically cleared

## 2021-10-18 NOTE — PROGRESS NOTE ADULT - PROBLEM SELECTOR PLAN 1
- SIRS+: fever, leukocytosis, tachycardia, tachypnea, on 5L oxygen, CTAP with RLL consolidation s/p aspiration event. Hypotensive MAP <65 requiring levophed, now weaned. received fluid resuscitation and 20 mg midodrine in ED with appropriate response  - POCUS in ED with a-line predominance in anterior lung fields bilaterally. hepatization of right lung, B lines at right lower hemithorax, with air bronchograms. No pleural effusion appreciated  - received vanc and zosyn in ED  - MICU consulted, not a candidate at this time  - continue zosyn x 7days  - midodrine 10 q8 weaned to 7.5mg q8h on 10/17, will continue to wean as tolerated  - nutrition consult appreciated  - maintain MAP >65  - blood cultures and ucx  - vitals and pulse ox q4 - presented with cough, fever and new oxygen requirement after observed aspiration event from g-tube feeds and CTAP with alveolar opacities   - continue zosyn x7days (10/14-10/21)  - chest PT   - wean oxygen as tolerated  - blood cultures and urine cultures NGTD

## 2021-10-18 NOTE — PROGRESS NOTE ADULT - SUBJECTIVE AND OBJECTIVE BOX
PROGRESS NOTE:   Authored by Helena Thurston MD PGY-1  Pager [phone] [facility], [address] [facility]   Please page night float  after 7PM    Patient is a 55y old  Male who presents with a chief complaint of septic shock (17 Oct 2021 19:32)      SUBJECTIVE / OVERNIGHT EVENTS:    ADDITIONAL REVIEW OF SYSTEMS:    MEDICATIONS  (STANDING):  artificial  tears Solution 1 Drop(s) Both EYES every 12 hours  enoxaparin Injectable 40 milliGRAM(s) SubCutaneous daily  lactated ringers. 1000 milliLiter(s) (75 mL/Hr) IV Continuous <Continuous>  levETIRAcetam  Solution 500 milliGRAM(s) Oral two times a day  midodrine 7.5 milliGRAM(s) Oral every 8 hours  piperacillin/tazobactam IVPB.. 3.375 Gram(s) IV Intermittent every 8 hours  polyethylene glycol 3350 17 Gram(s) Oral <User Schedule>    MEDICATIONS  (PRN):  haloperidol    Concentrate 2 milliGRAM(s) Oral every 12 hours PRN agitation  ondansetron Injectable 4 milliGRAM(s) IV Push every 8 hours PRN Nausea and/or Vomiting  polyethylene glycol 3350 17 Gram(s) Oral daily PRN Constipation      CAPILLARY BLOOD GLUCOSE        I&O's Summary    17 Oct 2021 07:01  -  18 Oct 2021 07:00  --------------------------------------------------------  IN: 0 mL / OUT: 800 mL / NET: -800 mL        PHYSICAL EXAM:  Vital Signs Last 24 Hrs  T(C): 36.8 (18 Oct 2021 05:38), Max: 37.1 (18 Oct 2021 02:00)  T(F): 98.3 (18 Oct 2021 05:38), Max: 98.7 (18 Oct 2021 02:00)  HR: 90 (18 Oct 2021 05:38) (76 - 93)  BP: 108/67 (18 Oct 2021 05:38) (107/67 - 121/80)  BP(mean): --  RR: 20 (18 Oct 2021 05:38) (18 - 20)  SpO2: 93% (18 Oct 2021 05:38) (93% - 99%)    CONSTITUTIONAL: NAD, well-developed  RESPIRATORY: Normal respiratory effort; lungs are clear to auscultation bilaterally  CARDIOVASCULAR: Regular rate and rhythm, normal S1 and S2, no murmur/rub/gallop; No lower extremity edema; Peripheral pulses are 2+ bilaterally  ABDOMEN: Nontender to palpation, normoactive bowel sounds, no rebound/guarding  MUSCULOSKELETAL: no clubbing or cyanosis of digits; no joint swelling or tenderness to palpation  PSYCH: A+O to person, place, and time; affect appropriate    LABS:                        12.5   7.12  )-----------( 143      ( 17 Oct 2021 07:23 )             36.4     10-17    139  |  102  |  9   ----------------------------<  156<H>  3.6   |  26  |  0.45<L>    Ca    8.7      17 Oct 2021 07:23  Phos  3.7     10-17  Mg     2.20     10-17    TPro  6.5  /  Alb  3.1<L>  /  TBili  0.2  /  DBili  x   /  AST  22  /  ALT  18  /  AlkPhos  74  10-17                RADIOLOGY & ADDITIONAL TESTS:  Results Reviewed:   Imaging Personally Reviewed:  Electrocardiogram Personally Reviewed:    COORDINATION OF CARE:  Care Discussed with Consultants/Other Providers [Y/N]:  Prior or Outpatient Records Reviewed [Y/N]:   PROGRESS NOTE:   Authored by Helena Thurston MD PGY-1  Pager [phone] [facility], [address] [facility]   Please page night float  after 7PM    Patient is a 55y old  Male who presents with a chief complaint of septic shock (17 Oct 2021 19:32)      SUBJECTIVE / OVERNIGHT EVENTS: No acute events overnight. Patient seen and examined at bedside this AM. Patient is non-verbal (baseline), review of symptoms limited secondary to mental status.     ADDITIONAL REVIEW OF SYSTEMS: negative    MEDICATIONS  (STANDING):  artificial  tears Solution 1 Drop(s) Both EYES every 12 hours  enoxaparin Injectable 40 milliGRAM(s) SubCutaneous daily  lactated ringers. 1000 milliLiter(s) (75 mL/Hr) IV Continuous <Continuous>  levETIRAcetam  Solution 500 milliGRAM(s) Oral two times a day  midodrine 7.5 milliGRAM(s) Oral every 8 hours  piperacillin/tazobactam IVPB.. 3.375 Gram(s) IV Intermittent every 8 hours  polyethylene glycol 3350 17 Gram(s) Oral <User Schedule>    MEDICATIONS  (PRN):  haloperidol    Concentrate 2 milliGRAM(s) Oral every 12 hours PRN agitation  ondansetron Injectable 4 milliGRAM(s) IV Push every 8 hours PRN Nausea and/or Vomiting  polyethylene glycol 3350 17 Gram(s) Oral daily PRN Constipation      CAPILLARY BLOOD GLUCOSE        I&O's Summary    17 Oct 2021 07:01  -  18 Oct 2021 07:00  --------------------------------------------------------  IN: 0 mL / OUT: 800 mL / NET: -800 mL        PHYSICAL EXAM:  Vital Signs Last 24 Hrs  T(C): 36.8 (18 Oct 2021 05:38), Max: 37.1 (18 Oct 2021 02:00)  T(F): 98.3 (18 Oct 2021 05:38), Max: 98.7 (18 Oct 2021 02:00)  HR: 90 (18 Oct 2021 05:38) (76 - 93)  BP: 108/67 (18 Oct 2021 05:38) (107/67 - 121/80)  BP(mean): --  RR: 20 (18 Oct 2021 05:38) (18 - 20)  SpO2: 93% (18 Oct 2021 05:38) (93% - 99%)    CONSTITUTIONAL: comfortable, not in acute distress, cachetic  EYES: No conjunctival or scleral injection, non-icteric;   NECK: Trachea midline without palpable neck mass  RESPIRATORY: + cough, on 2L oxygen, lungs with bibasilar crackles/rhonchi   CARDIOVASCULAR: +S1S2, RRR, no M/G/R; pedal pulses full and symmetric; no lower extremity edema  GASTROINTESTINAL: No palpable masses or tenderness, +BS throughout, no rebound/guarding; no hepatosplenomegaly; gtube and colostomy in place  SKIN: No rashes or ulcers noted  NEUROLOGIC: alert, tracking as I move around bed, nonverbal    LABS:                        12.5   7.12  )-----------( 143      ( 17 Oct 2021 07:23 )             36.4     10-17    139  |  102  |  9   ----------------------------<  156<H>  3.6   |  26  |  0.45<L>    Ca    8.7      17 Oct 2021 07:23  Phos  3.7     10-17  Mg     2.20     10-17    TPro  6.5  /  Alb  3.1<L>  /  TBili  0.2  /  DBili  x   /  AST  22  /  ALT  18  /  AlkPhos  74  10-17                RADIOLOGY & ADDITIONAL TESTS:  Results Reviewed:   Imaging Personally Reviewed:  Electrocardiogram Personally Reviewed:    COORDINATION OF CARE:  Care Discussed with Consultants/Other Providers [Y/N]:  Prior or Outpatient Records Reviewed [Y/N]:   PROGRESS NOTE:   Authored by Helena Thurston MD PGY-1  Pager [phone] [facility], [address] [facility]   Please page night float  after 7PM    Patient is a 55y old  Male who presents with a chief complaint of septic shock (17 Oct 2021 19:32)      SUBJECTIVE / OVERNIGHT EVENTS: No acute events overnight. Patient seen and examined at bedside this AM. Patient is non-verbal (baseline), review of symptoms limited secondary to mental status.     ADDITIONAL REVIEW OF SYSTEMS: negative    MEDICATIONS  (STANDING):  artificial  tears Solution 1 Drop(s) Both EYES every 12 hours  enoxaparin Injectable 40 milliGRAM(s) SubCutaneous daily  lactated ringers. 1000 milliLiter(s) (75 mL/Hr) IV Continuous <Continuous>  levETIRAcetam  Solution 500 milliGRAM(s) Oral two times a day  midodrine 7.5 milliGRAM(s) Oral every 8 hours  piperacillin/tazobactam IVPB.. 3.375 Gram(s) IV Intermittent every 8 hours  polyethylene glycol 3350 17 Gram(s) Oral <User Schedule>    MEDICATIONS  (PRN):  haloperidol    Concentrate 2 milliGRAM(s) Oral every 12 hours PRN agitation  ondansetron Injectable 4 milliGRAM(s) IV Push every 8 hours PRN Nausea and/or Vomiting  polyethylene glycol 3350 17 Gram(s) Oral daily PRN Constipation      CAPILLARY BLOOD GLUCOSE        I&O's Summary    17 Oct 2021 07:01  -  18 Oct 2021 07:00  --------------------------------------------------------  IN: 0 mL / OUT: 800 mL / NET: -800 mL        PHYSICAL EXAM:  Vital Signs Last 24 Hrs  T(C): 36.8 (18 Oct 2021 05:38), Max: 37.1 (18 Oct 2021 02:00)  T(F): 98.3 (18 Oct 2021 05:38), Max: 98.7 (18 Oct 2021 02:00)  HR: 90 (18 Oct 2021 05:38) (76 - 93)  BP: 108/67 (18 Oct 2021 05:38) (107/67 - 121/80)  BP(mean): --  RR: 20 (18 Oct 2021 05:38) (18 - 20)  SpO2: 93% (18 Oct 2021 05:38) (93% - 99%)    CONSTITUTIONAL: comfortable, not in acute distress, cachetic  EYES: No conjunctival or scleral injection, non-icteric;   NECK: Trachea midline without palpable neck mass  RESPIRATORY: + cough, on 2L oxygen, lungs with bibasilar crackles/rhonchi   CARDIOVASCULAR: +S1S2, RRR, no M/G/R; pedal pulses full and symmetric; no lower extremity edema  GASTROINTESTINAL: No palpable masses or tenderness, +BS throughout, no rebound/guarding; no hepatosplenomegaly; gtube and colostomy in place  SKIN: No rashes or ulcers noted  NEUROLOGIC: alert, tracking as I move around bed, nonverbal    LABS:                        12.5   7.12  )-----------( 143      ( 17 Oct 2021 07:23 )             36.4     10-17    139  |  102  |  9   ----------------------------<  156<H>  3.6   |  26  |  0.45<L>    Ca    8.7      17 Oct 2021 07:23  Phos  3.7     10-17  Mg     2.20     10-17    TPro  6.5  /  Alb  3.1<L>  /  TBili  0.2  /  DBili  x   /  AST  22  /  ALT  18  /  AlkPhos  74  10-17                RADIOLOGY & ADDITIONAL TESTS:  Results Reviewed:   Imaging Personally Reviewed:  Electrocardiogram Personally Reviewed:    COORDINATION OF CARE:  Care Discussed with Consultants/Other Providers [Y/N]: nutrition   Prior or Outpatient Records Reviewed [Y/N]:   PROGRESS NOTE:   Authored by Helena Thurston MD PGY-1  Pager [phone] [facility], [address] [facility]   Please page night float  after 7PM    Patient is a 55y old  Male who presents with a chief complaint of septic shock (17 Oct 2021 19:32)    SUBJECTIVE / OVERNIGHT EVENTS: No acute events overnight. Patient seen and examined at bedside this AM. Patient is non-verbal (baseline), review of symptoms limited secondary to mental status.     MEDICATIONS  (STANDING):  artificial  tears Solution 1 Drop(s) Both EYES every 12 hours  enoxaparin Injectable 40 milliGRAM(s) SubCutaneous daily  lactated ringers. 1000 milliLiter(s) (75 mL/Hr) IV Continuous <Continuous>  levETIRAcetam  Solution 500 milliGRAM(s) Oral two times a day  midodrine 7.5 milliGRAM(s) Oral every 8 hours  piperacillin/tazobactam IVPB.. 3.375 Gram(s) IV Intermittent every 8 hours  polyethylene glycol 3350 17 Gram(s) Oral <User Schedule>    MEDICATIONS  (PRN):  haloperidol    Concentrate 2 milliGRAM(s) Oral every 12 hours PRN agitation  ondansetron Injectable 4 milliGRAM(s) IV Push every 8 hours PRN Nausea and/or Vomiting  polyethylene glycol 3350 17 Gram(s) Oral daily PRN Constipation    I&O's Summary    17 Oct 2021 07:01  -  18 Oct 2021 07:00  --------------------------------------------------------  IN: 0 mL / OUT: 800 mL / NET: -800 mL    PHYSICAL EXAM:  Vital Signs Last 24 Hrs  T(C): 36.8 (18 Oct 2021 05:38), Max: 37.1 (18 Oct 2021 02:00)  T(F): 98.3 (18 Oct 2021 05:38), Max: 98.7 (18 Oct 2021 02:00)  HR: 90 (18 Oct 2021 05:38) (76 - 93)  BP: 108/67 (18 Oct 2021 05:38) (107/67 - 121/80)  RR: 20 (18 Oct 2021 05:38) (18 - 20)  SpO2: 93% (18 Oct 2021 05:38) (93% - 99%)    CONSTITUTIONAL: comfortable, not in acute distress, cachetic  EYES: No conjunctival or scleral injection, non-icteric;   NECK: Trachea midline without palpable neck mass  RESPIRATORY: + cough, on 2L oxygen, lungs with bibasilar crackles/rhonchi   CARDIOVASCULAR: +S1S2, RRR, no M/G/R; pedal pulses full and symmetric; no lower extremity edema  GASTROINTESTINAL: No palpable masses or tenderness, +BS throughout, no rebound/guarding; no hepatosplenomegaly; gtube and colostomy in place  SKIN: No rashes or ulcers noted  NEUROLOGIC: alert, tracking as I move around bed, nonverbal    LABS:                        12.5   7.12  )-----------( 143      ( 17 Oct 2021 07:23 )             36.4     10-17    139  |  102  |  9   ----------------------------<  156<H>  3.6   |  26  |  0.45<L>    Ca    8.7      17 Oct 2021 07:23  Phos  3.7     10-17  Mg     2.20     10-17    TPro  6.5  /  Alb  3.1<L>  /  TBili  0.2  /  DBili  x   /  AST  22  /  ALT  18  /  AlkPhos  74  10-17      RADIOLOGY & ADDITIONAL TESTS:  Results Reviewed:   Imaging Personally Reviewed:  Electrocardiogram Personally Reviewed:    COORDINATION OF CARE:  Care Discussed with Consultants/Other Providers [Y/N]: nutrition   Prior or Outpatient Records Reviewed [Y/N]:

## 2021-10-19 LAB
CULTURE RESULTS: SIGNIFICANT CHANGE UP
CULTURE RESULTS: SIGNIFICANT CHANGE UP
SARS-COV-2 RNA SPEC QL NAA+PROBE: SIGNIFICANT CHANGE UP
SPECIMEN SOURCE: SIGNIFICANT CHANGE UP
SPECIMEN SOURCE: SIGNIFICANT CHANGE UP

## 2021-10-19 PROCEDURE — 99232 SBSQ HOSP IP/OBS MODERATE 35: CPT | Mod: GC

## 2021-10-19 RX ADMIN — PIPERACILLIN AND TAZOBACTAM 25 GRAM(S): 4; .5 INJECTION, POWDER, LYOPHILIZED, FOR SOLUTION INTRAVENOUS at 05:54

## 2021-10-19 RX ADMIN — ENOXAPARIN SODIUM 40 MILLIGRAM(S): 100 INJECTION SUBCUTANEOUS at 12:42

## 2021-10-19 RX ADMIN — Medication 1 DROP(S): at 05:53

## 2021-10-19 RX ADMIN — Medication 1 DROP(S): at 17:31

## 2021-10-19 RX ADMIN — PIPERACILLIN AND TAZOBACTAM 25 GRAM(S): 4; .5 INJECTION, POWDER, LYOPHILIZED, FOR SOLUTION INTRAVENOUS at 13:21

## 2021-10-19 RX ADMIN — LEVETIRACETAM 500 MILLIGRAM(S): 250 TABLET, FILM COATED ORAL at 17:31

## 2021-10-19 RX ADMIN — LEVETIRACETAM 500 MILLIGRAM(S): 250 TABLET, FILM COATED ORAL at 06:09

## 2021-10-19 RX ADMIN — PIPERACILLIN AND TAZOBACTAM 25 GRAM(S): 4; .5 INJECTION, POWDER, LYOPHILIZED, FOR SOLUTION INTRAVENOUS at 21:22

## 2021-10-19 NOTE — PHYSICAL THERAPY INITIAL EVALUATION ADULT - PATIENT PROFILE REVIEW, REHAB EVAL
PT orders received: no formal activity orders. Consult with AMADOR CHARLTON, patient may participate in PT evaluation./yes

## 2021-10-19 NOTE — PROGRESS NOTE ADULT - PROBLEM SELECTOR PLAN 2
Patient hypotensive with MAP <65 on admission  - midodrine 10mg q8 weaned to 7.5mg q8 on 10/17 weaned to 5mg q8 for SBP <100 on 10/18  - maintain MAP >65 Patient hypotensive with MAP <65 on admission  - midodrine 10mg q8 weaned to 7.5mg q8 on 10/17 weaned to 5mg q8 for SBP <100 on 10/18  - d/c'd midodrine 10/19, MAPs have been >65

## 2021-10-19 NOTE — PHYSICAL THERAPY INITIAL EVALUATION ADULT - ADDITIONAL COMMENTS
Pt admitted from group home. Per Care Coordination Assessment: Pt is dependent in all ADLs and utilizes a tilt table.     Pt was left semisupine with head of bed elevated to 30°, all lines/tubes intact and call bell within reach, RN aware.

## 2021-10-19 NOTE — PHYSICAL THERAPY INITIAL EVALUATION ADULT - PASSIVE RANGE OF MOTION EXAMINATION, REHAB EVAL
Left shoulder flexion 0-15, Left elbow flexion , Left wrist contracted to neutral, Left digits 1/3/4 flexed, Left knee flexion 5-130, Left ankle DF to neutral, Right shoulder flexion 0-15, Right elbow flexion , Right wrist flexion contracture, Right digits 1/3/4/5 flexed, Right knee flexion , Right DF to neutral, Bilateral hip Adduction contractures Pt resisting movement: gross assessment as follows. Left shoulder flexion 0-15, Left elbow flexion , Left wrist contracted to neutral, Left digits 1/3/4 flexed, Left knee flexion 5-130, Left ankle DF to neutral, Right shoulder flexion 0-15, Right elbow flexion , Right wrist flexion contracture, Right digits 1/3/4/5 flexed, Right knee flexion , Right DF to neutral, Bilateral hip Adduction contractures

## 2021-10-19 NOTE — PROGRESS NOTE ADULT - ATTENDING COMMENTS
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 Patient briefly on pressors in ED, seen by MICU and not a candidate.     # Aspiration PNA: sepsis criteria on admission with fever (101.7)  tachycardia (117) RR 26 required levophed gtt in ED  - decrease to Midodrine 5 mg q8h prn SBP <100; Rlower lobe consolidation and patchy airspace opacity in the right middle lobe. Trace right pleural effusion.  - sepsis resolved   - c/w zosyn, D6  - Aggressive chest PT, pulmonary toilet for secretions   - Nutrition eval to assist w/ G tube feeds   - Supplemental Oxygen decreased RA    # Acute hypoxic respiratory failure 2/2 aspiration: required up to 4L  - now on RA, resolved     # Seizure disorder: continue Keppra    Tolerating TFs at goal rate, +UOP and ostomy output.  Dispo planning  Group home stating pt was ?ambulatory? seems unlikely based on my standing him up today and muscle atropy

## 2021-10-19 NOTE — PHYSICAL THERAPY INITIAL EVALUATION ADULT - PREDICTED DURATION OF THERAPY (DAYS/WKS), PT EVAL
Pt appears to be at/close to functional baseline and does not meet criteria for PT intervention while at [facility].

## 2021-10-19 NOTE — PROGRESS NOTE ADULT - SUBJECTIVE AND OBJECTIVE BOX
PROGRESS NOTE:   Authored by Helena Thurston MD PGY-1  Pager [phone] [facility], [address] [facility]   Please page night float  after 7PM    Patient is a 55y old  Male who presents with a chief complaint of septic shock (18 Oct 2021 07:10)      SUBJECTIVE / OVERNIGHT EVENTS:    ADDITIONAL REVIEW OF SYSTEMS:    MEDICATIONS  (STANDING):  artificial  tears Solution 1 Drop(s) Both EYES every 12 hours  enoxaparin Injectable 40 milliGRAM(s) SubCutaneous daily  levETIRAcetam  Solution 500 milliGRAM(s) Oral two times a day  piperacillin/tazobactam IVPB.. 3.375 Gram(s) IV Intermittent every 8 hours  polyethylene glycol 3350 17 Gram(s) Oral <User Schedule>    MEDICATIONS  (PRN):  haloperidol    Concentrate 2 milliGRAM(s) Oral every 12 hours PRN agitation  midodrine 5 milliGRAM(s) Oral every 8 hours PRN SBP <100  ondansetron Injectable 4 milliGRAM(s) IV Push every 8 hours PRN Nausea and/or Vomiting  polyethylene glycol 3350 17 Gram(s) Oral daily PRN Constipation      CAPILLARY BLOOD GLUCOSE        I&O's Summary      PHYSICAL EXAM:  Vital Signs Last 24 Hrs  T(C): 37 (19 Oct 2021 05:34), Max: 37.4 (19 Oct 2021 02:46)  T(F): 98.6 (19 Oct 2021 05:34), Max: 99.4 (19 Oct 2021 02:46)  HR: 94 (19 Oct 2021 05:34) (82 - 94)  BP: 114/68 (19 Oct 2021 05:34) (93/58 - 120/73)  BP(mean): --  RR: 17 (19 Oct 2021 05:34) (17 - 20)  SpO2: 94% (19 Oct 2021 05:34) (94% - 96%)    CONSTITUTIONAL: NAD, well-developed  RESPIRATORY: Normal respiratory effort; lungs are clear to auscultation bilaterally  CARDIOVASCULAR: Regular rate and rhythm, normal S1 and S2, no murmur/rub/gallop; No lower extremity edema; Peripheral pulses are 2+ bilaterally  ABDOMEN: Nontender to palpation, normoactive bowel sounds, no rebound/guarding  MUSCULOSKELETAL: no clubbing or cyanosis of digits; no joint swelling or tenderness to palpation  PSYCH: A+O to person, place, and time; affect appropriate    LABS:                        13.1   8.33  )-----------( 187      ( 18 Oct 2021 07:53 )             38.7     10-18    137  |  98  |  12  ----------------------------<  133<H>  4.0   |  27  |  0.50    Ca    9.1      18 Oct 2021 07:53  Phos  3.8     10-18  Mg     2.30     10-18    TPro  6.5  /  Alb  3.1<L>  /  TBili  0.2  /  DBili  x   /  AST  22  /  ALT  18  /  AlkPhos  74  10-17                RADIOLOGY & ADDITIONAL TESTS:  Results Reviewed:   Imaging Personally Reviewed:  Electrocardiogram Personally Reviewed:    COORDINATION OF CARE:  Care Discussed with Consultants/Other Providers [Y/N]:  Prior or Outpatient Records Reviewed [Y/N]:   PROGRESS NOTE:   Authored by Helena Thurston MD PGY-1  Pager [phone] [facility], [address] [facility]   Please page night float  after 7PM    Patient is a 55y old  Male who presents with a chief complaint of septic shock (18 Oct 2021 07:10)      SUBJECTIVE / OVERNIGHT EVENTS: No acute events overnight. Patient seen and examined at bedside this AM. ROS limited due to the fact that patient is non-verbal. Patient's cough improving slightly over time.    ADDITIONAL REVIEW OF SYSTEMS: negative     MEDICATIONS  (STANDING):  artificial  tears Solution 1 Drop(s) Both EYES every 12 hours  enoxaparin Injectable 40 milliGRAM(s) SubCutaneous daily  levETIRAcetam  Solution 500 milliGRAM(s) Oral two times a day  piperacillin/tazobactam IVPB.. 3.375 Gram(s) IV Intermittent every 8 hours  polyethylene glycol 3350 17 Gram(s) Oral <User Schedule>    MEDICATIONS  (PRN):  haloperidol    Concentrate 2 milliGRAM(s) Oral every 12 hours PRN agitation  midodrine 5 milliGRAM(s) Oral every 8 hours PRN SBP <100  ondansetron Injectable 4 milliGRAM(s) IV Push every 8 hours PRN Nausea and/or Vomiting  polyethylene glycol 3350 17 Gram(s) Oral daily PRN Constipation      CAPILLARY BLOOD GLUCOSE        I&O's Summary      PHYSICAL EXAM:  Vital Signs Last 24 Hrs  T(C): 37 (19 Oct 2021 05:34), Max: 37.4 (19 Oct 2021 02:46)  T(F): 98.6 (19 Oct 2021 05:34), Max: 99.4 (19 Oct 2021 02:46)  HR: 94 (19 Oct 2021 05:34) (82 - 94)  BP: 114/68 (19 Oct 2021 05:34) (93/58 - 120/73)  BP(mean): --  RR: 17 (19 Oct 2021 05:34) (17 - 20)  SpO2: 94% (19 Oct 2021 05:34) (94% - 96%)    CONSTITUTIONAL: comfortable, not in acute distress, cachetic  EYES: No conjunctival or scleral injection, non-icteric;   RESPIRATORY: + cough, on room air, lungs with bibasilar crackles, improved from prior   CARDIOVASCULAR: +S1S2, RRR, no M/G/R; pedal pulses full and symmetric; no lower extremity edema  GASTROINTESTINAL: No palpable masses or tenderness, +BS throughout, no rebound/guarding; no hepatosplenomegaly; gtube and colostomy in place  SKIN: No rashes or ulcers noted  NEUROLOGIC: alert, tracks movement, nonverbal      LABS:                        13.1   8.33  )-----------( 187      ( 18 Oct 2021 07:53 )             38.7     10-18    137  |  98  |  12  ----------------------------<  133<H>  4.0   |  27  |  0.50    Ca    9.1      18 Oct 2021 07:53  Phos  3.8     10-18  Mg     2.30     10-18    TPro  6.5  /  Alb  3.1<L>  /  TBili  0.2  /  DBili  x   /  AST  22  /  ALT  18  /  AlkPhos  74  10-17                RADIOLOGY & ADDITIONAL TESTS:  Results Reviewed:   Imaging Personally Reviewed:  Electrocardiogram Personally Reviewed:    COORDINATION OF CARE:  Care Discussed with Consultants/Other Providers [Y/N]:   Prior or Outpatient Records Reviewed [Y/N]:   PROGRESS NOTE:   Authored by Helena Thurston MD PGY-1  Pager [phone] [facility], [address] [facility]   Please page night float  after 7PM    Patient is a 55y old  Male who presents with a chief complaint of septic shock (18 Oct 2021 07:10)    SUBJECTIVE / OVERNIGHT EVENTS: No acute events overnight. Patient seen and examined at bedside this AM. ROS limited due to the fact that patient is non-verbal. Patient's cough improving slightly over time.    ADDITIONAL REVIEW OF SYSTEMS: negative     MEDICATIONS  (STANDING):  artificial  tears Solution 1 Drop(s) Both EYES every 12 hours  enoxaparin Injectable 40 milliGRAM(s) SubCutaneous daily  levETIRAcetam  Solution 500 milliGRAM(s) Oral two times a day  piperacillin/tazobactam IVPB.. 3.375 Gram(s) IV Intermittent every 8 hours  polyethylene glycol 3350 17 Gram(s) Oral <User Schedule>    MEDICATIONS  (PRN):  haloperidol    Concentrate 2 milliGRAM(s) Oral every 12 hours PRN agitation  midodrine 5 milliGRAM(s) Oral every 8 hours PRN SBP <100  ondansetron Injectable 4 milliGRAM(s) IV Push every 8 hours PRN Nausea and/or Vomiting  polyethylene glycol 3350 17 Gram(s) Oral daily PRN Constipation      CAPILLARY BLOOD GLUCOSE        I&O's Summary      PHYSICAL EXAM:  Vital Signs Last 24 Hrs  T(C): 37 (19 Oct 2021 05:34), Max: 37.4 (19 Oct 2021 02:46)  T(F): 98.6 (19 Oct 2021 05:34), Max: 99.4 (19 Oct 2021 02:46)  HR: 94 (19 Oct 2021 05:34) (82 - 94)  BP: 114/68 (19 Oct 2021 05:34) (93/58 - 120/73)  BP(mean): --  RR: 17 (19 Oct 2021 05:34) (17 - 20)  SpO2: 94% (19 Oct 2021 05:34) (94% - 96%)    CONSTITUTIONAL: comfortable, not in acute distress, cachetic  EYES: No conjunctival or scleral injection, non-icteric;   RESPIRATORY: + cough, on room air, lungs with bibasilar crackles, improved from prior   CARDIOVASCULAR: +S1S2, RRR, no M/G/R; pedal pulses full and symmetric; no lower extremity edema  GASTROINTESTINAL: No palpable masses or tenderness, +BS throughout, no rebound/guarding; no hepatosplenomegaly; gtube and colostomy in place  SKIN: No rashes or ulcers noted  NEUROLOGIC: alert, tracks movement, nonverbal      LABS:                        13.1   8.33  )-----------( 187      ( 18 Oct 2021 07:53 )             38.7     10-18    137  |  98  |  12  ----------------------------<  133<H>  4.0   |  27  |  0.50    Ca    9.1      18 Oct 2021 07:53  Phos  3.8     10-18  Mg     2.30     10-18    TPro  6.5  /  Alb  3.1<L>  /  TBili  0.2  /  DBili  x   /  AST  22  /  ALT  18  /  AlkPhos  74  10-17                RADIOLOGY & ADDITIONAL TESTS:  Results Reviewed:   Imaging Personally Reviewed:  Electrocardiogram Personally Reviewed:    COORDINATION OF CARE:  Care Discussed with Consultants/Other Providers [Y/N]:   Prior or Outpatient Records Reviewed [Y/N]:

## 2021-10-19 NOTE — PROGRESS NOTE ADULT - PROBLEM SELECTOR PLAN 4
Home feeding schedule: Jevity 1.5 tube feeds (5 cans for daily feeding, at 6 AM, 10 AM, 2 PM, 6 PM, 10 PM/125 cc of water before and after feeds)  - Nutrition eval appreciated- now at goal feeds per nutrition Home feeding schedule: Jevity 1.5 tube feeds (5 cans for daily feeding, at 6 AM, 10 AM, 2 PM, 6 PM, 10 PM/125 cc of water before and after feeds)  - Nutrition eval appreciated- now at goal feeds per nutrition  - G tube in place per GI

## 2021-10-19 NOTE — PROGRESS NOTE ADULT - PROBLEM SELECTOR PLAN 9
FEN/GI: tube feeds   PPx: lovenox  Dispo: back to group home when medically cleared FEN/GI: tube feeds   PPx: lovenox  Dispo: back to group home vs rehab

## 2021-10-19 NOTE — PROGRESS NOTE ADULT - ASSESSMENT
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

## 2021-10-19 NOTE — PHYSICAL THERAPY INITIAL EVALUATION ADULT - IMPAIRMENTS FOUND, PT EVAL
arousal, attention, and cognition/cognitive impairment/joint integrity and mobility/muscle strength/ROM

## 2021-10-19 NOTE — PHYSICAL THERAPY INITIAL EVALUATION ADULT - PERTINENT HX OF CURRENT PROBLEM, REHAB EVAL
Pt is a 55 year old male brought in from group home with concern for aspiration. Found to septic shock from aspiration pneumonia. 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

## 2021-10-19 NOTE — PROGRESS NOTE ADULT - PROBLEM SELECTOR PLAN 1
- presented with cough, fever and new oxygen requirement after observed aspiration event from g-tube feeds and CTAP with alveolar opacities   - continue zosyn x7days (10/14-10/21)  - chest PT   - wean oxygen as tolerated  - blood cultures and urine cultures NGTD Presented with cough, fever and new oxygen requirement after observed aspiration event from g-tube feeds and CTAP with alveolar opacities   - continue zosyn x7days (10/14-10/20)  - chest PT   - now saturating well on room air   - blood cultures and urine cultures no growth

## 2021-10-20 PROCEDURE — 99232 SBSQ HOSP IP/OBS MODERATE 35: CPT | Mod: GC

## 2021-10-20 RX ADMIN — Medication 1 DROP(S): at 05:26

## 2021-10-20 RX ADMIN — PIPERACILLIN AND TAZOBACTAM 25 GRAM(S): 4; .5 INJECTION, POWDER, LYOPHILIZED, FOR SOLUTION INTRAVENOUS at 22:07

## 2021-10-20 RX ADMIN — PIPERACILLIN AND TAZOBACTAM 25 GRAM(S): 4; .5 INJECTION, POWDER, LYOPHILIZED, FOR SOLUTION INTRAVENOUS at 05:26

## 2021-10-20 RX ADMIN — ENOXAPARIN SODIUM 40 MILLIGRAM(S): 100 INJECTION SUBCUTANEOUS at 12:18

## 2021-10-20 RX ADMIN — LEVETIRACETAM 500 MILLIGRAM(S): 250 TABLET, FILM COATED ORAL at 05:27

## 2021-10-20 RX ADMIN — Medication 1 DROP(S): at 17:42

## 2021-10-20 RX ADMIN — LEVETIRACETAM 500 MILLIGRAM(S): 250 TABLET, FILM COATED ORAL at 17:42

## 2021-10-20 RX ADMIN — PIPERACILLIN AND TAZOBACTAM 25 GRAM(S): 4; .5 INJECTION, POWDER, LYOPHILIZED, FOR SOLUTION INTRAVENOUS at 12:43

## 2021-10-20 NOTE — PROGRESS NOTE ADULT - PROBLEM SELECTOR PLAN 4
Home feeding schedule: Jevity 1.5 tube feeds (5 cans for daily feeding, at 6 AM, 10 AM, 2 PM, 6 PM, 10 PM/125 cc of water before and after feeds)  - Nutrition eval appreciated- now at goal feeds per nutrition  - G tube in place per GI Home feeding schedule: Jevity 1.5 tube feeds (5 cans for daily feeding, at 6 AM, 10 AM, 2 PM, 6 PM, 10 PM/125 cc of water before and after feeds)  - Nutrition eval appreciated- now at goal feeds per nutrition  - G tube in place per GI, does not need to be replaced

## 2021-10-20 NOTE — PROGRESS NOTE ADULT - ATTENDING COMMENTS
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 Patient briefly on pressors in ED, seen by MICU and not a candidate.     # Aspiration PNA: sepsis criteria on admission with fever (101.7)  tachycardia (117) RR 26 required levophed gtt in ED  - decrease to Midodrine 5 mg q8h prn SBP <100; Rlower lobe consolidation and patchy airspace opacity in the right middle lobe. Trace right pleural effusion.  - sepsis resolved   - c/w zosyn, D7 to end today  - Aggressive chest PT, pulmonary toilet for secretions     # Acute hypoxic respiratory failure 2/2 aspiration: required up to 4L  - now on RA, resolved     # Seizure disorder: continue Keppra    Tolerating TFs at goal rate, +UOP and ostomy output; GI looked at PEG stated it was fine  Dispo planning  Group home stating pt was ?ambulatory? seems unlikely based on my standing him up 10/19 with generalized weakness and inability to follow any commands although alert and tracking

## 2021-10-20 NOTE — PROGRESS NOTE ADULT - PROBLEM SELECTOR PLAN 2
Patient hypotensive with MAP <65 on admission  - midodrine 10mg q8 weaned to 7.5mg q8 on 10/17 weaned to 5mg q8 for SBP <100 on 10/18  - d/c'd midodrine 10/19, MAPs have been >65

## 2021-10-20 NOTE — PROGRESS NOTE ADULT - PROBLEM SELECTOR PLAN 1
Presented with cough, fever and new oxygen requirement after observed aspiration event from g-tube feeds and CTAP with alveolar opacities   - continue zosyn x7days (10/14-10/20)  - chest PT   - now saturating well on room air   - blood cultures and urine cultures no growth

## 2021-10-20 NOTE — CHART NOTE - NSCHARTNOTEFT_GEN_A_CORE
Patient assessed by this RDN on 10/19, now for malnutrition follow up. Spoke with RN and obtained subjective information from extensive chart review.       Enteral Nutrition: Diet, NPO with Tube Feed:   Tube Feeding Modality: Gastrostomy  Jevity 1.2 Tacho (JEVITY1.2RTH)  Total Volume for 24 Hours (mL): 1320  Continuous  Starting Tube Feed Rate {mL per Hour}: 30  Increase Tube Feed Rate by (mL): 10     Every 4 hours  Until Goal Tube Feed Rate (mL per Hour): 55  Tube Feed Duration (in Hours): 24  Tube Feed Start Time: 09:00 (10-17-21 @ 08:17)    Current Weight: unavailable; Admit Wt: 56.1 kg      Nutrition Interval Events: Pt medically stable for discharge with plan to return to Group Home. Bolus regimen to resume once pt to be discharged. To meet pt's estimated nutrition needs while limiting aspiration risk possibly due to overfeeding, would suggest a bolus regimen of Jevity 1.5, 240 ml, 4 times per day, 960 total volume. This would offer pt 1440 kcal, 61 gms protein, 729 ml free H2O in 960 ml total volume. This suggestion would give pt 26 kcal/kg and 1.1 gms protein/kg based on dosing weight. Pt currently tolerating ordered TF with positive colostomy output. No edema nor pressure injuries noted. RDN services to remain available as needed.         __________________ Pertinent Medications__________________   MEDICATIONS  (STANDING):  artificial  tears Solution 1 Drop(s) Both EYES every 12 hours  enoxaparin Injectable 40 milliGRAM(s) SubCutaneous daily  levETIRAcetam  Solution 500 milliGRAM(s) Oral two times a day  piperacillin/tazobactam IVPB.. 3.375 Gram(s) IV Intermittent every 8 hours  polyethylene glycol 3350 17 Gram(s) Oral <User Schedule>    MEDICATIONS  (PRN):  haloperidol    Concentrate 2 milliGRAM(s) Oral every 12 hours PRN agitation  ondansetron Injectable 4 milliGRAM(s) IV Push every 8 hours PRN Nausea and/or Vomiting  polyethylene glycol 3350 17 Gram(s) Oral daily PRN Constipation      __________________ Pertinent Labs__________________   10-18 Na137 mmol/L Glu 133 mg/dL<H> K+ 4.0 mmol/L Cr  0.50 mg/dL BUN 12 mg/dL 10-18 Phos 3.8 mg/dL 10-17 Alb 3.1 g/dL<L>      Estimated Needs:       1402 - 1683 kcal/day (25-30 kcal/kg ABW)  63 - 94 gms protein/day (1.0-1.5 gms/kg IBW)      Nutrition Diagnosis: Severe malnutrition  [x] ongoing      Goal(s):  1. Patient to meet > 75% estimated energy needs    Recommendations:   1. Upon d/c, suggest Jevity 1.5, 240 ml bolus, 4 times per day, 960 ml total volume.    Monitoring and Evaluation:   1. Monitor weights, labs, BMs, skin integrity, TF tolerance and edema.  2. RD services to remain available. Request obtaining new weight to assess trend and determine adequacy of TF rate.

## 2021-10-21 PROCEDURE — 99232 SBSQ HOSP IP/OBS MODERATE 35: CPT | Mod: GC

## 2021-10-21 RX ORDER — BENZTROPINE MESYLATE 1 MG
0.5 TABLET ORAL EVERY 12 HOURS
Refills: 0 | Status: DISCONTINUED | OUTPATIENT
Start: 2021-10-22 | End: 2021-11-10

## 2021-10-21 RX ORDER — BENZTROPINE MESYLATE 1 MG
0.5 TABLET ORAL ONCE
Refills: 0 | Status: COMPLETED | OUTPATIENT
Start: 2021-10-21 | End: 2021-10-21

## 2021-10-21 RX ORDER — HALOPERIDOL DECANOATE 100 MG/ML
2 INJECTION INTRAMUSCULAR EVERY 24 HOURS
Refills: 0 | Status: DISCONTINUED | OUTPATIENT
Start: 2021-10-21 | End: 2021-10-22

## 2021-10-21 RX ORDER — HALOPERIDOL DECANOATE 100 MG/ML
1 INJECTION INTRAMUSCULAR EVERY 24 HOURS
Refills: 0 | Status: DISCONTINUED | OUTPATIENT
Start: 2021-10-22 | End: 2021-10-22

## 2021-10-21 RX ADMIN — Medication 0.5 MILLIGRAM(S): at 14:55

## 2021-10-21 RX ADMIN — Medication 1 DROP(S): at 06:16

## 2021-10-21 RX ADMIN — LEVETIRACETAM 500 MILLIGRAM(S): 250 TABLET, FILM COATED ORAL at 18:06

## 2021-10-21 RX ADMIN — HALOPERIDOL DECANOATE 2 MILLIGRAM(S): 100 INJECTION INTRAMUSCULAR at 23:01

## 2021-10-21 RX ADMIN — PIPERACILLIN AND TAZOBACTAM 25 GRAM(S): 4; .5 INJECTION, POWDER, LYOPHILIZED, FOR SOLUTION INTRAVENOUS at 06:12

## 2021-10-21 RX ADMIN — POLYETHYLENE GLYCOL 3350 17 GRAM(S): 17 POWDER, FOR SOLUTION ORAL at 18:06

## 2021-10-21 RX ADMIN — LEVETIRACETAM 500 MILLIGRAM(S): 250 TABLET, FILM COATED ORAL at 06:13

## 2021-10-21 RX ADMIN — Medication 1 DROP(S): at 18:06

## 2021-10-21 RX ADMIN — ENOXAPARIN SODIUM 40 MILLIGRAM(S): 100 INJECTION SUBCUTANEOUS at 12:00

## 2021-10-21 NOTE — PROGRESS NOTE ADULT - PROBLEM SELECTOR PLAN 1
Presented with cough, fever and new oxygen requirement after observed aspiration event from g-tube feeds and CTAP with alveolar opacities   - continue zosyn x7days (10/14-10/20)  - chest PT   - now saturating well on room air   - blood cultures and urine cultures no growth Presented with cough, fever and new oxygen requirement after observed aspiration event from g-tube feeds and CTAP with alveolar opacities   - completed course of zosyn x7days (10/14-10/20)  - chest PT   - now saturating well on room air   - blood cultures and urine cultures no growth

## 2021-10-21 NOTE — PROGRESS NOTE ADULT - ATTENDING COMMENTS
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     # Aspiration PNA: sepsis criteria on admission with fever (101.7)  tachycardia (117) RR (26) required levophed gtt in ED  - off midodrine (transient pressors in ED)  - completed abx   - Aggressive chest PT, pulmonary toilet for secretions     # Acute hypoxic respiratory failure 2/2 aspiration: required up to 4L  - now on RA, resolved     # Seizure disorder:  - continue Keppra    Tolerating TFs at goal rate, +UOP and ostomy output; GI looked at PEG stated it was fine  Group home stating pt was ?ambulatory? seems unlikely based on my standing him up 10/19 with generalized weakness and inability to follow any commands although alert and tracking he does not follow commands in any consistent way now for 3 days despite always being awake and tracking    medically stable for dc  defer to his group home to appropriate place for him to live given functional deficits that may be progressing as he ages and becomes more complicated (Ie now s/p ostomy and PEG in 2021).

## 2021-10-21 NOTE — PROGRESS NOTE ADULT - SUBJECTIVE AND OBJECTIVE BOX
PROGRESS NOTE:   Authored by Helena Thurston MD PGY-1  Pager [phone] [facility], [address] [facility]   Please page night float  after 7PM    Patient is a 55y old  Male who presents with a chief complaint of PNA (20 Oct 2021 07:19)      SUBJECTIVE / OVERNIGHT EVENTS:    ADDITIONAL REVIEW OF SYSTEMS:    MEDICATIONS  (STANDING):  artificial  tears Solution 1 Drop(s) Both EYES every 12 hours  enoxaparin Injectable 40 milliGRAM(s) SubCutaneous daily  levETIRAcetam  Solution 500 milliGRAM(s) Oral two times a day  piperacillin/tazobactam IVPB.. 3.375 Gram(s) IV Intermittent every 8 hours  polyethylene glycol 3350 17 Gram(s) Oral <User Schedule>    MEDICATIONS  (PRN):  haloperidol    Concentrate 2 milliGRAM(s) Oral every 12 hours PRN agitation  ondansetron Injectable 4 milliGRAM(s) IV Push every 8 hours PRN Nausea and/or Vomiting  polyethylene glycol 3350 17 Gram(s) Oral daily PRN Constipation      CAPILLARY BLOOD GLUCOSE        I&O's Summary      PHYSICAL EXAM:  Vital Signs Last 24 Hrs  T(C): 37.3 (21 Oct 2021 06:17), Max: 37.3 (21 Oct 2021 06:17)  T(F): 99.1 (21 Oct 2021 06:17), Max: 99.1 (21 Oct 2021 06:17)  HR: 94 (20 Oct 2021 21:59) (80 - 94)  BP: 96/60 (21 Oct 2021 06:17) (96/60 - 102/70)  BP(mean): --  RR: 18 (21 Oct 2021 06:17) (18 - 18)  SpO2: 97% (21 Oct 2021 06:17) (94% - 99%)    CONSTITUTIONAL: NAD, well-developed  RESPIRATORY: Normal respiratory effort; lungs are clear to auscultation bilaterally  CARDIOVASCULAR: Regular rate and rhythm, normal S1 and S2, no murmur/rub/gallop; No lower extremity edema; Peripheral pulses are 2+ bilaterally  ABDOMEN: Nontender to palpation, normoactive bowel sounds, no rebound/guarding  MUSCULOSKELETAL: no clubbing or cyanosis of digits; no joint swelling or tenderness to palpation  PSYCH: A+O to person, place, and time; affect appropriate    LABS:                      RADIOLOGY & ADDITIONAL TESTS:  Results Reviewed:   Imaging Personally Reviewed:  Electrocardiogram Personally Reviewed:    COORDINATION OF CARE:  Care Discussed with Consultants/Other Providers [Y/N]:  Prior or Outpatient Records Reviewed [Y/N]:   PROGRESS NOTE:   Authored by Helena Thurston MD PGY-1  Pager [phone] [facility], [address] [facility]   Please page night float  after 7PM    Patient is a 55y old  Male who presents with a chief complaint of PNA (20 Oct 2021 07:19)      SUBJECTIVE / OVERNIGHT EVENTS: No acute events overnight. This AM patient seen and examined at bedside. Patient nonverbal and therefore ROS limited. Patient coughing less than prior.     ADDITIONAL REVIEW OF SYSTEMS: negative     MEDICATIONS  (STANDING):  artificial  tears Solution 1 Drop(s) Both EYES every 12 hours  enoxaparin Injectable 40 milliGRAM(s) SubCutaneous daily  levETIRAcetam  Solution 500 milliGRAM(s) Oral two times a day  piperacillin/tazobactam IVPB.. 3.375 Gram(s) IV Intermittent every 8 hours  polyethylene glycol 3350 17 Gram(s) Oral <User Schedule>    MEDICATIONS  (PRN):  haloperidol    Concentrate 2 milliGRAM(s) Oral every 12 hours PRN agitation  ondansetron Injectable 4 milliGRAM(s) IV Push every 8 hours PRN Nausea and/or Vomiting  polyethylene glycol 3350 17 Gram(s) Oral daily PRN Constipation      CAPILLARY BLOOD GLUCOSE        I&O's Summary      PHYSICAL EXAM:  Vital Signs Last 24 Hrs  T(C): 37.3 (21 Oct 2021 06:17), Max: 37.3 (21 Oct 2021 06:17)  T(F): 99.1 (21 Oct 2021 06:17), Max: 99.1 (21 Oct 2021 06:17)  HR: 94 (20 Oct 2021 21:59) (80 - 94)  BP: 96/60 (21 Oct 2021 06:17) (96/60 - 102/70)  BP(mean): --  RR: 18 (21 Oct 2021 06:17) (18 - 18)  SpO2: 97% (21 Oct 2021 06:17) (94% - 99%)    CONSTITUTIONAL: comfortable, not in acute distress, cachetic  EYES: No conjunctival or scleral injection, non-icteric;   RESPIRATORY: + cough, on room air, lungs with bibasilar crackles   CARDIOVASCULAR: +S1S2, RRR, no M/G/R; pedal pulses full and symmetric; no lower extremity edema  GASTROINTESTINAL: No palpable masses or tenderness, +BS throughout, no rebound/guarding; no hepatosplenomegaly; gtube and colostomy in place  SKIN: No rashes or ulcers noted  NEUROLOGIC: alert, tracks movement, nonverbal    LABS:        RADIOLOGY & ADDITIONAL TESTS:  Results Reviewed:   Imaging Personally Reviewed:  Electrocardiogram Personally Reviewed:    COORDINATION OF CARE:  Care Discussed with Consultants/Other Providers [Y/N]:   Prior or Outpatient Records Reviewed [Y/N]:

## 2021-10-21 NOTE — PROGRESS NOTE ADULT - PROBLEM SELECTOR PLAN 4
Home feeding schedule: Jevity 1.5 tube feeds (5 cans for daily feeding, at 6 AM, 10 AM, 2 PM, 6 PM, 10 PM/125 cc of water before and after feeds)  - Nutrition eval appreciated- now at goal feeds per nutrition  - G tube in place per GI, does not need to be replaced Home feeding schedule: Jevity 1.5 tube feeds (5 cans for daily feeding, at 6 AM, 10 AM, 2 PM, 6 PM, 10 PM/125 cc of water before and after feeds)  - Nutrition eval appreciated- now at goal feeds per nutrition  - G tube in place per GI, does not need to be replaced  - per nutrition upon will d/c on Jevity 1.5, 240 ml bolus, 4 times per day, 960 ml total volume.

## 2021-10-21 NOTE — CHART NOTE - NSCHARTNOTEFT_GEN_A_CORE
I have personally performed chart review of patient's multiple recent ED visits to this facility during which time his functional status has been clinically described as non-ambulatory and chronically contracted, consistent with patient's current appearance and exam. He is cachectic, contracted, and exhibiting chronically poor mobility. PT assessment during this admission c/w assessment that patient is at or near his functional baseline and that he is NOT a candidate for disposition to rehabilitation facility due to his underlying functional status, they continue to recommend disposition per his group home as he is at his baseline functional status. Multiple recent ED visits with similar functional status describe patient as being discharged back to group home via private car or ambulette with wheelchair or stretcher. Will continue to attempt to reach GH manager to clarify this. However, at this time the patient does not meet criteria for inpatient rehab and is medically cleared for discharge. Will require placement to previous group home or new facility.     Yang Ornelas MD (PGY-2)

## 2021-10-22 PROCEDURE — 99232 SBSQ HOSP IP/OBS MODERATE 35: CPT | Mod: GC

## 2021-10-22 RX ORDER — HALOPERIDOL DECANOATE 100 MG/ML
2 INJECTION INTRAMUSCULAR EVERY 24 HOURS
Refills: 0 | Status: DISCONTINUED | OUTPATIENT
Start: 2021-10-22 | End: 2021-10-26

## 2021-10-22 RX ORDER — HALOPERIDOL DECANOATE 100 MG/ML
1 INJECTION INTRAMUSCULAR EVERY 24 HOURS
Refills: 0 | Status: DISCONTINUED | OUTPATIENT
Start: 2021-10-22 | End: 2021-10-22

## 2021-10-22 RX ORDER — HALOPERIDOL DECANOATE 100 MG/ML
1 INJECTION INTRAMUSCULAR EVERY 24 HOURS
Refills: 0 | Status: DISCONTINUED | OUTPATIENT
Start: 2021-10-23 | End: 2021-10-26

## 2021-10-22 RX ADMIN — ENOXAPARIN SODIUM 40 MILLIGRAM(S): 100 INJECTION SUBCUTANEOUS at 13:07

## 2021-10-22 RX ADMIN — Medication 1 DROP(S): at 06:49

## 2021-10-22 RX ADMIN — Medication 1 DROP(S): at 17:06

## 2021-10-22 RX ADMIN — LEVETIRACETAM 500 MILLIGRAM(S): 250 TABLET, FILM COATED ORAL at 17:07

## 2021-10-22 RX ADMIN — LEVETIRACETAM 500 MILLIGRAM(S): 250 TABLET, FILM COATED ORAL at 06:49

## 2021-10-22 RX ADMIN — HALOPERIDOL DECANOATE 2 MILLIGRAM(S): 100 INJECTION INTRAMUSCULAR at 23:02

## 2021-10-22 RX ADMIN — Medication 0.5 MILLIGRAM(S): at 06:49

## 2021-10-22 RX ADMIN — Medication 0.5 MILLIGRAM(S): at 17:07

## 2021-10-22 NOTE — PROGRESS NOTE ADULT - PROBLEM SELECTOR PLAN 7
- continue home miralax (2x/week Monday and Thursday at 7pm) thru G tube - continue home miralax thru G tube

## 2021-10-22 NOTE — PROGRESS NOTE ADULT - ATTENDING COMMENTS
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     # Aspiration PNA: sepsis criteria on admission with fever (101.7)  tachycardia (117) RR (26) required levophed gtt in ED  - off midodrine (transient pressors in ED)  - completed abx   - Aggressive chest PT, pulmonary toilet for secretions     # Acute hypoxic respiratory failure 2/2 aspiration: required up to 4L  - now on RA, resolved     # Seizure disorder:  - continue Keppra    Tolerating TFs at goal rate, +UOP and ostomy output; GI looked at PEG stated it was fine  Group home stating pt was ?ambulatory? seems unlikely based on my standing him up 10/19 with generalized weakness and inability to follow any commands although alert and tracking he does not follow commands in any consistent way now for 4 days despite always being awake and tracking    medically stable for dc  defer to his group home to appropriate place for him to live given functional deficits that may be progressing as he ages and becomes more complicated (Ie now s/p ostomy and PEG in 2021).

## 2021-10-22 NOTE — PROGRESS NOTE ADULT - SUBJECTIVE AND OBJECTIVE BOX
PROGRESS NOTE:   Authored by Helena Thurston MD PGY-1  Pager [phone] [facility], [address] [facility]   Please page night float  after 7PM    Patient is a 55y old  Male who presents with a chief complaint of septic shock (21 Oct 2021 07:27)      SUBJECTIVE / OVERNIGHT EVENTS:    ADDITIONAL REVIEW OF SYSTEMS:    MEDICATIONS  (STANDING):  artificial  tears Solution 1 Drop(s) Both EYES every 12 hours  benztropine 0.5 milliGRAM(s) Oral every 12 hours  enoxaparin Injectable 40 milliGRAM(s) SubCutaneous daily  haloperidol    Concentrate 1 milliGRAM(s) Oral every 24 hours  haloperidol    Concentrate 2 milliGRAM(s) Oral every 24 hours  levETIRAcetam  Solution 500 milliGRAM(s) Oral two times a day  polyethylene glycol 3350 17 Gram(s) Oral <User Schedule>    MEDICATIONS  (PRN):  ondansetron Injectable 4 milliGRAM(s) IV Push every 8 hours PRN Nausea and/or Vomiting  polyethylene glycol 3350 17 Gram(s) Oral daily PRN Constipation      CAPILLARY BLOOD GLUCOSE        I&O's Summary      PHYSICAL EXAM:  Vital Signs Last 24 Hrs  T(C): 37.2 (22 Oct 2021 06:07), Max: 37.2 (22 Oct 2021 06:07)  T(F): 99 (22 Oct 2021 06:07), Max: 99 (22 Oct 2021 06:07)  HR: 105 (22 Oct 2021 06:07) (101 - 107)  BP: 114/71 (22 Oct 2021 06:07) (93/62 - 114/71)  BP(mean): --  RR: 18 (22 Oct 2021 06:07) (18 - 19)  SpO2: 94% (22 Oct 2021 06:07) (94% - 94%)    CONSTITUTIONAL: NAD, well-developed  RESPIRATORY: Normal respiratory effort; lungs are clear to auscultation bilaterally  CARDIOVASCULAR: Regular rate and rhythm, normal S1 and S2, no murmur/rub/gallop; No lower extremity edema; Peripheral pulses are 2+ bilaterally  ABDOMEN: Nontender to palpation, normoactive bowel sounds, no rebound/guarding  MUSCULOSKELETAL: no clubbing or cyanosis of digits; no joint swelling or tenderness to palpation  PSYCH: A+O to person, place, and time; affect appropriate    LABS:                      RADIOLOGY & ADDITIONAL TESTS:  Results Reviewed:   Imaging Personally Reviewed:  Electrocardiogram Personally Reviewed:    COORDINATION OF CARE:  Care Discussed with Consultants/Other Providers [Y/N]:  Prior or Outpatient Records Reviewed [Y/N]:   PROGRESS NOTE:   Authored by Helena Thurston MD PGY-1  Pager [phone] [facility], [address] [facility]   Please page night float  after 7PM    Patient is a 55y old  Male who presents with a chief complaint of septic shock (21 Oct 2021 07:27)      SUBJECTIVE / OVERNIGHT EVENTS: No acute events overnight. Patient seen and examined at bedside this AM. ROS limited as patient is nonverbal at baseline.     ADDITIONAL REVIEW OF SYSTEMS: negative     MEDICATIONS  (STANDING):  artificial  tears Solution 1 Drop(s) Both EYES every 12 hours  benztropine 0.5 milliGRAM(s) Oral every 12 hours  enoxaparin Injectable 40 milliGRAM(s) SubCutaneous daily  haloperidol    Concentrate 1 milliGRAM(s) Oral every 24 hours  haloperidol    Concentrate 2 milliGRAM(s) Oral every 24 hours  levETIRAcetam  Solution 500 milliGRAM(s) Oral two times a day  polyethylene glycol 3350 17 Gram(s) Oral <User Schedule>    MEDICATIONS  (PRN):  ondansetron Injectable 4 milliGRAM(s) IV Push every 8 hours PRN Nausea and/or Vomiting  polyethylene glycol 3350 17 Gram(s) Oral daily PRN Constipation      CAPILLARY BLOOD GLUCOSE        I&O's Summary      PHYSICAL EXAM:  Vital Signs Last 24 Hrs  T(C): 37.2 (22 Oct 2021 06:07), Max: 37.2 (22 Oct 2021 06:07)  T(F): 99 (22 Oct 2021 06:07), Max: 99 (22 Oct 2021 06:07)  HR: 105 (22 Oct 2021 06:07) (101 - 107)  BP: 114/71 (22 Oct 2021 06:07) (93/62 - 114/71)  BP(mean): --  RR: 18 (22 Oct 2021 06:07) (18 - 19)  SpO2: 94% (22 Oct 2021 06:07) (94% - 94%)    CONSTITUTIONAL: comfortable, not in acute distress, cachetic  EYES: No conjunctival or scleral injection, non-icteric;   RESPIRATORY: + cough, on room air, lungs with slight bibasilar crackles   CARDIOVASCULAR: +S1S2, RRR, no M/G/R; pedal pulses full and symmetric; no lower extremity edema  GASTROINTESTINAL: No palpable masses or tenderness, +BS throughout, no rebound/guarding; no hepatosplenomegaly; gtube and colostomy in place  SKIN: No rashes or ulcers noted  NEUROLOGIC: alert, tracks movement, nonverbal    LABS:    RADIOLOGY & ADDITIONAL TESTS:  Results Reviewed:   Imaging Personally Reviewed:  Electrocardiogram Personally Reviewed:    COORDINATION OF CARE:  Care Discussed with Consultants/Other Providers [Y/N]:  Prior or Outpatient Records Reviewed [Y/N]:

## 2021-10-22 NOTE — PROGRESS NOTE ADULT - PROBLEM SELECTOR PLAN 1
Presented with cough, fever and new oxygen requirement after observed aspiration event from g-tube feeds and CTAP with alveolar opacities   - completed course of zosyn x7days (10/14-10/20)  - chest PT   - now saturating well on room air   - blood cultures and urine cultures no growth

## 2021-10-22 NOTE — PROGRESS NOTE ADULT - PROBLEM SELECTOR PLAN 4
Home feeding schedule: Jevity 1.5 tube feeds (5 cans for daily feeding, at 6 AM, 10 AM, 2 PM, 6 PM, 10 PM/125 cc of water before and after feeds)  - Nutrition eval appreciated- now at goal feeds per nutrition  - G tube in place per GI, does not need to be replaced  - per nutrition upon will d/c on Jevity 1.5, 240 ml bolus, 4 times per day, 960 ml total volume. Home feeding schedule: Jevity 1.5 tube feeds (5 cans for daily feeding, at 6 AM, 10 AM, 2 PM, 6 PM, 10 PM/125 cc of water before and after feeds)  - Nutrition eval appreciated- now at goal feeds per nutrition  - G tube in place per GI, does not need to be replaced  - now on bolus feeds Jevity 1.2 300cc bolus, 4 times per day

## 2021-10-22 NOTE — PROGRESS NOTE ADULT - PROBLEM SELECTOR PLAN 8
- hold home haloperidol 2 mg/ml (1ml AM, 2 ml PM) thru G tube  - PRN haldol 2 mg/ml 1 ml q12 PRN - continue home haloperidol 2 mg/ml (1ml AM, 2 ml PM) thru G tube

## 2021-10-23 LAB
ALBUMIN SERPL ELPH-MCNC: 3.5 G/DL — SIGNIFICANT CHANGE UP (ref 3.3–5)
ALP SERPL-CCNC: 74 U/L — SIGNIFICANT CHANGE UP (ref 40–120)
ALT FLD-CCNC: 41 U/L — SIGNIFICANT CHANGE UP (ref 4–41)
ANION GAP SERPL CALC-SCNC: 12 MMOL/L — SIGNIFICANT CHANGE UP (ref 7–14)
AST SERPL-CCNC: 34 U/L — SIGNIFICANT CHANGE UP (ref 4–40)
BASE EXCESS BLDV CALC-SCNC: 6.8 MMOL/L — HIGH (ref -2–3)
BASOPHILS # BLD AUTO: 0.04 K/UL — SIGNIFICANT CHANGE UP (ref 0–0.2)
BASOPHILS NFR BLD AUTO: 0.2 % — SIGNIFICANT CHANGE UP (ref 0–2)
BILIRUB SERPL-MCNC: 0.2 MG/DL — SIGNIFICANT CHANGE UP (ref 0.2–1.2)
BLOOD GAS VENOUS COMPREHENSIVE RESULT: SIGNIFICANT CHANGE UP
BUN SERPL-MCNC: 52 MG/DL — HIGH (ref 7–23)
CALCIUM SERPL-MCNC: 9.6 MG/DL — SIGNIFICANT CHANGE UP (ref 8.4–10.5)
CHLORIDE BLDV-SCNC: 106 MMOL/L — SIGNIFICANT CHANGE UP (ref 96–108)
CHLORIDE SERPL-SCNC: 106 MMOL/L — SIGNIFICANT CHANGE UP (ref 98–107)
CO2 BLDV-SCNC: 32.6 MMOL/L — HIGH (ref 22–26)
CO2 SERPL-SCNC: 26 MMOL/L — SIGNIFICANT CHANGE UP (ref 22–31)
CREAT SERPL-MCNC: 0.62 MG/DL — SIGNIFICANT CHANGE UP (ref 0.5–1.3)
EOSINOPHIL # BLD AUTO: 0 K/UL — SIGNIFICANT CHANGE UP (ref 0–0.5)
EOSINOPHIL NFR BLD AUTO: 0 % — SIGNIFICANT CHANGE UP (ref 0–6)
GAS PNL BLDV: 142 MMOL/L — SIGNIFICANT CHANGE UP (ref 136–145)
GLUCOSE BLDV-MCNC: 114 MG/DL — HIGH (ref 70–99)
GLUCOSE SERPL-MCNC: 115 MG/DL — HIGH (ref 70–99)
HCO3 BLDV-SCNC: 31 MMOL/L — HIGH (ref 22–29)
HCT VFR BLD CALC: 43.8 % — SIGNIFICANT CHANGE UP (ref 39–50)
HCT VFR BLDA CALC: 44 % — SIGNIFICANT CHANGE UP (ref 39–51)
HGB BLD CALC-MCNC: 14.6 G/DL — SIGNIFICANT CHANGE UP (ref 13–17)
HGB BLD-MCNC: 14.8 G/DL — SIGNIFICANT CHANGE UP (ref 13–17)
IANC: 14 K/UL — HIGH (ref 1.5–8.5)
IMM GRANULOCYTES NFR BLD AUTO: 0.4 % — SIGNIFICANT CHANGE UP (ref 0–1.5)
LACTATE BLDV-MCNC: 1.4 MMOL/L — SIGNIFICANT CHANGE UP (ref 0.5–2)
LYMPHOCYTES # BLD AUTO: 1.27 K/UL — SIGNIFICANT CHANGE UP (ref 1–3.3)
LYMPHOCYTES # BLD AUTO: 7.9 % — LOW (ref 13–44)
MCHC RBC-ENTMCNC: 31.7 PG — SIGNIFICANT CHANGE UP (ref 27–34)
MCHC RBC-ENTMCNC: 33.8 GM/DL — SIGNIFICANT CHANGE UP (ref 32–36)
MCV RBC AUTO: 93.8 FL — SIGNIFICANT CHANGE UP (ref 80–100)
MONOCYTES # BLD AUTO: 0.74 K/UL — SIGNIFICANT CHANGE UP (ref 0–0.9)
MONOCYTES NFR BLD AUTO: 4.6 % — SIGNIFICANT CHANGE UP (ref 2–14)
NEUTROPHILS # BLD AUTO: 14 K/UL — HIGH (ref 1.8–7.4)
NEUTROPHILS NFR BLD AUTO: 86.9 % — HIGH (ref 43–77)
NRBC # BLD: 0 /100 WBCS — SIGNIFICANT CHANGE UP
NRBC # FLD: 0 K/UL — SIGNIFICANT CHANGE UP
PCO2 BLDV: 43 MMHG — SIGNIFICANT CHANGE UP (ref 42–55)
PH BLDV: 7.47 — HIGH (ref 7.32–7.43)
PLATELET # BLD AUTO: 398 K/UL — SIGNIFICANT CHANGE UP (ref 150–400)
PO2 BLDV: 78 MMHG — SIGNIFICANT CHANGE UP
POTASSIUM BLDV-SCNC: 4.1 MMOL/L — SIGNIFICANT CHANGE UP (ref 3.5–5.1)
POTASSIUM SERPL-MCNC: 4.4 MMOL/L — SIGNIFICANT CHANGE UP (ref 3.5–5.3)
POTASSIUM SERPL-SCNC: 4.4 MMOL/L — SIGNIFICANT CHANGE UP (ref 3.5–5.3)
PROT SERPL-MCNC: 7.8 G/DL — SIGNIFICANT CHANGE UP (ref 6–8.3)
RBC # BLD: 4.67 M/UL — SIGNIFICANT CHANGE UP (ref 4.2–5.8)
RBC # FLD: 13.3 % — SIGNIFICANT CHANGE UP (ref 10.3–14.5)
SAO2 % BLDV: 97.1 % — SIGNIFICANT CHANGE UP
SODIUM SERPL-SCNC: 144 MMOL/L — SIGNIFICANT CHANGE UP (ref 135–145)
WBC # BLD: 16.11 K/UL — HIGH (ref 3.8–10.5)
WBC # FLD AUTO: 16.11 K/UL — HIGH (ref 3.8–10.5)

## 2021-10-23 PROCEDURE — 71045 X-RAY EXAM CHEST 1 VIEW: CPT | Mod: 26

## 2021-10-23 PROCEDURE — 99232 SBSQ HOSP IP/OBS MODERATE 35: CPT | Mod: GC

## 2021-10-23 RX ORDER — SODIUM CHLORIDE 9 MG/ML
1000 INJECTION, SOLUTION INTRAVENOUS
Refills: 0 | Status: DISCONTINUED | OUTPATIENT
Start: 2021-10-23 | End: 2021-10-24

## 2021-10-23 RX ADMIN — Medication 1 DROP(S): at 05:46

## 2021-10-23 RX ADMIN — Medication 0.5 MILLIGRAM(S): at 17:49

## 2021-10-23 RX ADMIN — HALOPERIDOL DECANOATE 2 MILLIGRAM(S): 100 INJECTION INTRAMUSCULAR at 21:13

## 2021-10-23 RX ADMIN — Medication 0.5 MILLIGRAM(S): at 05:46

## 2021-10-23 RX ADMIN — HALOPERIDOL DECANOATE 1 MILLIGRAM(S): 100 INJECTION INTRAMUSCULAR at 09:40

## 2021-10-23 RX ADMIN — ENOXAPARIN SODIUM 40 MILLIGRAM(S): 100 INJECTION SUBCUTANEOUS at 11:08

## 2021-10-23 RX ADMIN — LEVETIRACETAM 500 MILLIGRAM(S): 250 TABLET, FILM COATED ORAL at 17:48

## 2021-10-23 RX ADMIN — Medication 1 DROP(S): at 17:49

## 2021-10-23 RX ADMIN — LEVETIRACETAM 500 MILLIGRAM(S): 250 TABLET, FILM COATED ORAL at 05:46

## 2021-10-23 RX ADMIN — SODIUM CHLORIDE 500 MILLILITER(S): 9 INJECTION, SOLUTION INTRAVENOUS at 22:51

## 2021-10-23 NOTE — PROGRESS NOTE ADULT - PROBLEM SELECTOR PLAN 4
Home feeding schedule: Jevity 1.5 tube feeds (5 cans for daily feeding, at 6 AM, 10 AM, 2 PM, 6 PM, 10 PM/125 cc of water before and after feeds)  - Nutrition eval appreciated- now at goal feeds per nutrition  - G tube in place per GI, does not need to be replaced  - now on bolus feeds Jevity 1.2 300cc bolus, 4 times per day

## 2021-10-23 NOTE — CHART NOTE - NSCHARTNOTEFT_GEN_A_CORE
Called to bedside by RN for increased secretions, gurgling when breathing.     Went bedside to evaluate patient. Patient Nonverbal at baseline, admitted for sepsis 2/2 aspiration PNA, now resolved awaiting DC, social factors.     Vitals: tachycardic 120s, SBP ~93 (baseline low 100s), T 98.1. O2 sat 88%, baseline 94-97%    PE: Patient with thick white secretions seen in oral cavity. Gurgling sign of secretions in airway. Lungs with course crackles throughout.    Impression/Plan:  Likely increased secretions vs. new aspiration. Low suspicion for new infection.   Patient without labs since 10/19 2/2 major clinical improvement.   - Suctioning done at bedside: oral and nasotracheal  - 4L NRB O2 delivery, titrate to 94% O2 sat. Can deescalate to NC or even wean O2 supplementation when resolves.   - STAT CXR  - VBG w lactate, CBC, CMP drawn.  - Chest PT   - 1L LR bolus over 2h for concern of SBP below baseline  - Holding 12am and 6am tube feeds for concern of aspiration, pt already NPO.    UPDATE:   - Patient O2 improving on NRB, will deescalate to NC and wean as tolerated  - CXR reviewed, clear lungs no concern for infection. Of note acute aspiration may not show signs on CXR, can be used as baseline if patient worsens   - Labs still not back, will review. Patient breathing more comfortably post-suction and O2 administration  - Continue to monitor clinically     James Laboy PGY-1  Internal Medicine Called to bedside by RN for increased secretions, gurgling when breathing.     Went bedside to evaluate patient. Patient Nonverbal at baseline, admitted for sepsis 2/2 aspiration PNA, now resolved awaiting DC, social factors.     Vitals: tachycardic 120s, SBP ~93 (baseline low 100s), T 98.1. O2 sat 88%, baseline 94-97%    PE: Patient with thick white secretions seen in oral cavity. Gurgling sign of secretions in airway. Lungs with course crackles throughout.    Impression/Plan:  Likely increased secretions vs. new aspiration. Low suspicion for new infection.   Patient without labs since 10/19 2/2 major clinical improvement.   - Suctioning done at bedside: oral and nasotracheal  - 4L NRB O2 delivery, titrate to 94% O2 sat. Can deescalate to NC or even wean O2 supplementation as tolerated  - STAT CXR  - VBG w lactate, CBC, CMP drawn.  - Chest PT   - 1L LR bolus over 2h for concern of SBP below baseline  - Holding 12am and 6am tube feeds for concern of aspiration, pt already NPO.    UPDATE:   - Patient O2 improving on 4L NRB, O2 sat 94-95%  - CXR reviewed, clear lungs no concern for infection. Of note acute aspiration may not show signs on CXR, can be used as baseline if patient worsens   - Patient breathing more comfortably post-suction and O2 administration  - Continue to monitor clinically     James Laboy PGY-1  Internal Medicine

## 2021-10-23 NOTE — PROGRESS NOTE ADULT - SUBJECTIVE AND OBJECTIVE BOX
PROGRESS NOTE:   Authored by Helena Thurston MD PGY-1  Pager [phone] [facility], [id number] [facility]   Please page night float  after 7PM    Patient is a 55y old  Male who presents with a chief complaint of septic shock (22 Oct 2021 07:13)      SUBJECTIVE / OVERNIGHT EVENTS:    ADDITIONAL REVIEW OF SYSTEMS:    MEDICATIONS  (STANDING):  artificial  tears Solution 1 Drop(s) Both EYES every 12 hours  benztropine 0.5 milliGRAM(s) Oral every 12 hours  enoxaparin Injectable 40 milliGRAM(s) SubCutaneous daily  haloperidol    Concentrate 1 milliGRAM(s) Oral every 24 hours  haloperidol    Concentrate 2 milliGRAM(s) Oral every 24 hours  levETIRAcetam  Solution 500 milliGRAM(s) Oral two times a day  polyethylene glycol 3350 17 Gram(s) Oral <User Schedule>    MEDICATIONS  (PRN):  ondansetron Injectable 4 milliGRAM(s) IV Push every 8 hours PRN Nausea and/or Vomiting  polyethylene glycol 3350 17 Gram(s) Oral daily PRN Constipation      CAPILLARY BLOOD GLUCOSE        I&O's Summary    22 Oct 2021 07:01  -  23 Oct 2021 07:00  --------------------------------------------------------  IN: 2200 mL / OUT: 0 mL / NET: 2200 mL        PHYSICAL EXAM:  Vital Signs Last 24 Hrs  T(C): 37.2 (23 Oct 2021 05:47), Max: 37.2 (23 Oct 2021 05:47)  T(F): 99 (23 Oct 2021 05:47), Max: 99 (23 Oct 2021 05:47)  HR: 108 (23 Oct 2021 05:47) (100 - 109)  BP: 103/68 (23 Oct 2021 05:47) (100/68 - 116/70)  BP(mean): --  RR: 18 (23 Oct 2021 05:47) (18 - 18)  SpO2: 95% (23 Oct 2021 05:47) (94% - 98%)    CONSTITUTIONAL: NAD, well-developed  RESPIRATORY: Normal respiratory effort; lungs are clear to auscultation bilaterally  CARDIOVASCULAR: Regular rate and rhythm, normal S1 and S2, no murmur/rub/gallop; No lower extremity edema; Peripheral pulses are 2+ bilaterally  ABDOMEN: Nontender to palpation, normoactive bowel sounds, no rebound/guarding  MUSCULOSKELETAL: no clubbing or cyanosis of digits; no joint swelling or tenderness to palpation  PSYCH: A+O to person, place, and time; affect appropriate    LABS:                      RADIOLOGY & ADDITIONAL TESTS:  Results Reviewed:   Imaging Personally Reviewed:  Electrocardiogram Personally Reviewed:    COORDINATION OF CARE:  Care Discussed with Consultants/Other Providers [Y/N]:  Prior or Outpatient Records Reviewed [Y/N]:   PROGRESS NOTE:   Authored by Helena Thurston MD PGY-1  Pager [phone] [facility], [id number] [facility]   Please page night float  after 7PM    Patient is a 55y old  Male who presents with a chief complaint of septic shock (22 Oct 2021 07:13)      SUBJECTIVE / OVERNIGHT EVENTS: No acute events overnight. This AM patient seen and examined at bedside. ROS limited as patient is nonverbal.     ADDITIONAL REVIEW OF SYSTEMS: negative     MEDICATIONS  (STANDING):  artificial  tears Solution 1 Drop(s) Both EYES every 12 hours  benztropine 0.5 milliGRAM(s) Oral every 12 hours  enoxaparin Injectable 40 milliGRAM(s) SubCutaneous daily  haloperidol    Concentrate 1 milliGRAM(s) Oral every 24 hours  haloperidol    Concentrate 2 milliGRAM(s) Oral every 24 hours  levETIRAcetam  Solution 500 milliGRAM(s) Oral two times a day  polyethylene glycol 3350 17 Gram(s) Oral <User Schedule>    MEDICATIONS  (PRN):  ondansetron Injectable 4 milliGRAM(s) IV Push every 8 hours PRN Nausea and/or Vomiting  polyethylene glycol 3350 17 Gram(s) Oral daily PRN Constipation      CAPILLARY BLOOD GLUCOSE        I&O's Summary    22 Oct 2021 07:01  -  23 Oct 2021 07:00  --------------------------------------------------------  IN: 2200 mL / OUT: 0 mL / NET: 2200 mL        PHYSICAL EXAM:  Vital Signs Last 24 Hrs  T(C): 37.2 (23 Oct 2021 05:47), Max: 37.2 (23 Oct 2021 05:47)  T(F): 99 (23 Oct 2021 05:47), Max: 99 (23 Oct 2021 05:47)  HR: 108 (23 Oct 2021 05:47) (100 - 109)  BP: 103/68 (23 Oct 2021 05:47) (100/68 - 116/70)  BP(mean): --  RR: 18 (23 Oct 2021 05:47) (18 - 18)  SpO2: 95% (23 Oct 2021 05:47) (94% - 98%)    CONSTITUTIONAL: comfortable, not in acute distress, cachetic  EYES: No conjunctival or scleral injection, non-icteric;   RESPIRATORY: no cough, lungs with slight bibasilar crackles   CARDIOVASCULAR: +S1S2, RRR, no M/G/R; pedal pulses full and symmetric; no lower extremity edema  GASTROINTESTINAL: No palpable masses or tenderness, +BS throughout, no rebound/guarding; no hepatosplenomegaly; gtube and colostomy in place  SKIN: No rashes or ulcers noted  NEUROLOGIC: alert, tracks movement, nonverbal    LABS:        RADIOLOGY & ADDITIONAL TESTS:  Results Reviewed:   Imaging Personally Reviewed:  Electrocardiogram Personally Reviewed:    COORDINATION OF CARE:  Care Discussed with Consultants/Other Providers [Y/N]:  Prior or Outpatient Records Reviewed [Y/N]:

## 2021-10-23 NOTE — PROGRESS NOTE ADULT - ATTENDING COMMENTS
Patient seen and evaluated at bedside, patient contracted , PEG.      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  Patient is pending placement .

## 2021-10-23 NOTE — PROGRESS NOTE ADULT - PROBLEM SELECTOR PLAN 9
FEN/GI: tube feeds   PPx: lovenox  Dispo: back to group home vs rehab FEN/GI: tube feeds   PPx: lovenox  Dispo: [facility] and CM working on finding new placement

## 2021-10-24 LAB
ALBUMIN SERPL ELPH-MCNC: 3.3 G/DL — SIGNIFICANT CHANGE UP (ref 3.3–5)
ALP SERPL-CCNC: 67 U/L — SIGNIFICANT CHANGE UP (ref 40–120)
ALT FLD-CCNC: 31 U/L — SIGNIFICANT CHANGE UP (ref 4–41)
ANION GAP SERPL CALC-SCNC: 12 MMOL/L — SIGNIFICANT CHANGE UP (ref 7–14)
APPEARANCE UR: CLEAR — SIGNIFICANT CHANGE UP
AST SERPL-CCNC: 25 U/L — SIGNIFICANT CHANGE UP (ref 4–40)
B PERT DNA SPEC QL NAA+PROBE: SIGNIFICANT CHANGE UP
B PERT+PARAPERT DNA PNL SPEC NAA+PROBE: SIGNIFICANT CHANGE UP
BASOPHILS # BLD AUTO: 0.04 K/UL — SIGNIFICANT CHANGE UP (ref 0–0.2)
BASOPHILS NFR BLD AUTO: 0.2 % — SIGNIFICANT CHANGE UP (ref 0–2)
BILIRUB SERPL-MCNC: 0.4 MG/DL — SIGNIFICANT CHANGE UP (ref 0.2–1.2)
BILIRUB UR-MCNC: NEGATIVE — SIGNIFICANT CHANGE UP
BORDETELLA PARAPERTUSSIS (RAPRVP): SIGNIFICANT CHANGE UP
BUN SERPL-MCNC: 45 MG/DL — HIGH (ref 7–23)
C PNEUM DNA SPEC QL NAA+PROBE: SIGNIFICANT CHANGE UP
CALCIUM SERPL-MCNC: 9.4 MG/DL — SIGNIFICANT CHANGE UP (ref 8.4–10.5)
CHLORIDE SERPL-SCNC: 104 MMOL/L — SIGNIFICANT CHANGE UP (ref 98–107)
CO2 SERPL-SCNC: 28 MMOL/L — SIGNIFICANT CHANGE UP (ref 22–31)
COLOR SPEC: YELLOW — SIGNIFICANT CHANGE UP
CREAT SERPL-MCNC: 0.64 MG/DL — SIGNIFICANT CHANGE UP (ref 0.5–1.3)
DIFF PNL FLD: NEGATIVE — SIGNIFICANT CHANGE UP
EOSINOPHIL # BLD AUTO: 0 K/UL — SIGNIFICANT CHANGE UP (ref 0–0.5)
EOSINOPHIL NFR BLD AUTO: 0 % — SIGNIFICANT CHANGE UP (ref 0–6)
FLUAV SUBTYP SPEC NAA+PROBE: SIGNIFICANT CHANGE UP
FLUBV RNA SPEC QL NAA+PROBE: SIGNIFICANT CHANGE UP
GLUCOSE SERPL-MCNC: 136 MG/DL — HIGH (ref 70–99)
GLUCOSE UR QL: NEGATIVE — SIGNIFICANT CHANGE UP
HADV DNA SPEC QL NAA+PROBE: SIGNIFICANT CHANGE UP
HCOV 229E RNA SPEC QL NAA+PROBE: SIGNIFICANT CHANGE UP
HCOV HKU1 RNA SPEC QL NAA+PROBE: SIGNIFICANT CHANGE UP
HCOV NL63 RNA SPEC QL NAA+PROBE: SIGNIFICANT CHANGE UP
HCOV OC43 RNA SPEC QL NAA+PROBE: SIGNIFICANT CHANGE UP
HCT VFR BLD CALC: 41.1 % — SIGNIFICANT CHANGE UP (ref 39–50)
HGB BLD-MCNC: 13.6 G/DL — SIGNIFICANT CHANGE UP (ref 13–17)
HMPV RNA SPEC QL NAA+PROBE: SIGNIFICANT CHANGE UP
HPIV1 RNA SPEC QL NAA+PROBE: SIGNIFICANT CHANGE UP
HPIV2 RNA SPEC QL NAA+PROBE: SIGNIFICANT CHANGE UP
HPIV3 RNA SPEC QL NAA+PROBE: SIGNIFICANT CHANGE UP
HPIV4 RNA SPEC QL NAA+PROBE: SIGNIFICANT CHANGE UP
IANC: 21.18 K/UL — HIGH (ref 1.5–8.5)
IMM GRANULOCYTES NFR BLD AUTO: 0.6 % — SIGNIFICANT CHANGE UP (ref 0–1.5)
KETONES UR-MCNC: NEGATIVE — SIGNIFICANT CHANGE UP
LEUKOCYTE ESTERASE UR-ACNC: NEGATIVE — SIGNIFICANT CHANGE UP
LYMPHOCYTES # BLD AUTO: 1.11 K/UL — SIGNIFICANT CHANGE UP (ref 1–3.3)
LYMPHOCYTES # BLD AUTO: 4.8 % — LOW (ref 13–44)
M PNEUMO DNA SPEC QL NAA+PROBE: SIGNIFICANT CHANGE UP
MCHC RBC-ENTMCNC: 31.7 PG — SIGNIFICANT CHANGE UP (ref 27–34)
MCHC RBC-ENTMCNC: 33.1 GM/DL — SIGNIFICANT CHANGE UP (ref 32–36)
MCV RBC AUTO: 95.8 FL — SIGNIFICANT CHANGE UP (ref 80–100)
MONOCYTES # BLD AUTO: 0.58 K/UL — SIGNIFICANT CHANGE UP (ref 0–0.9)
MONOCYTES NFR BLD AUTO: 2.5 % — SIGNIFICANT CHANGE UP (ref 2–14)
MRSA PCR RESULT.: SIGNIFICANT CHANGE UP
NEUTROPHILS # BLD AUTO: 21.18 K/UL — HIGH (ref 1.8–7.4)
NEUTROPHILS NFR BLD AUTO: 91.9 % — HIGH (ref 43–77)
NITRITE UR-MCNC: NEGATIVE — SIGNIFICANT CHANGE UP
NRBC # BLD: 0 /100 WBCS — SIGNIFICANT CHANGE UP
NRBC # FLD: 0 K/UL — SIGNIFICANT CHANGE UP
PH UR: 5.5 — SIGNIFICANT CHANGE UP (ref 5–8)
PLATELET # BLD AUTO: 329 K/UL — SIGNIFICANT CHANGE UP (ref 150–400)
POTASSIUM SERPL-MCNC: 4 MMOL/L — SIGNIFICANT CHANGE UP (ref 3.5–5.3)
POTASSIUM SERPL-SCNC: 4 MMOL/L — SIGNIFICANT CHANGE UP (ref 3.5–5.3)
PROT SERPL-MCNC: 7.4 G/DL — SIGNIFICANT CHANGE UP (ref 6–8.3)
PROT UR-MCNC: ABNORMAL
RAPID RVP RESULT: SIGNIFICANT CHANGE UP
RBC # BLD: 4.29 M/UL — SIGNIFICANT CHANGE UP (ref 4.2–5.8)
RBC # FLD: 13.3 % — SIGNIFICANT CHANGE UP (ref 10.3–14.5)
RSV RNA SPEC QL NAA+PROBE: SIGNIFICANT CHANGE UP
RV+EV RNA SPEC QL NAA+PROBE: SIGNIFICANT CHANGE UP
S AUREUS DNA NOSE QL NAA+PROBE: SIGNIFICANT CHANGE UP
SARS-COV-2 RNA SPEC QL NAA+PROBE: SIGNIFICANT CHANGE UP
SODIUM SERPL-SCNC: 144 MMOL/L — SIGNIFICANT CHANGE UP (ref 135–145)
SP GR SPEC: 1.04 — SIGNIFICANT CHANGE UP (ref 1–1.05)
UROBILINOGEN FLD QL: SIGNIFICANT CHANGE UP
WBC # BLD: 23.04 K/UL — HIGH (ref 3.8–10.5)
WBC # FLD AUTO: 23.04 K/UL — HIGH (ref 3.8–10.5)

## 2021-10-24 PROCEDURE — 99233 SBSQ HOSP IP/OBS HIGH 50: CPT | Mod: GC

## 2021-10-24 RX ORDER — PIPERACILLIN AND TAZOBACTAM 4; .5 G/20ML; G/20ML
3.38 INJECTION, POWDER, LYOPHILIZED, FOR SOLUTION INTRAVENOUS EVERY 8 HOURS
Refills: 0 | Status: COMPLETED | OUTPATIENT
Start: 2021-10-24 | End: 2021-10-28

## 2021-10-24 RX ORDER — PIPERACILLIN AND TAZOBACTAM 4; .5 G/20ML; G/20ML
3.38 INJECTION, POWDER, LYOPHILIZED, FOR SOLUTION INTRAVENOUS ONCE
Refills: 0 | Status: COMPLETED | OUTPATIENT
Start: 2021-10-24 | End: 2021-10-24

## 2021-10-24 RX ORDER — SODIUM CHLORIDE 9 MG/ML
1000 INJECTION INTRAMUSCULAR; INTRAVENOUS; SUBCUTANEOUS
Refills: 0 | Status: DISCONTINUED | OUTPATIENT
Start: 2021-10-24 | End: 2021-10-25

## 2021-10-24 RX ORDER — ACETAMINOPHEN 500 MG
650 TABLET ORAL EVERY 6 HOURS
Refills: 0 | Status: DISCONTINUED | OUTPATIENT
Start: 2021-10-24 | End: 2021-11-10

## 2021-10-24 RX ADMIN — SODIUM CHLORIDE 75 MILLILITER(S): 9 INJECTION INTRAMUSCULAR; INTRAVENOUS; SUBCUTANEOUS at 08:49

## 2021-10-24 RX ADMIN — Medication 0.5 MILLIGRAM(S): at 18:08

## 2021-10-24 RX ADMIN — Medication 650 MILLIGRAM(S): at 10:34

## 2021-10-24 RX ADMIN — LEVETIRACETAM 500 MILLIGRAM(S): 250 TABLET, FILM COATED ORAL at 05:40

## 2021-10-24 RX ADMIN — Medication 0.5 MILLIGRAM(S): at 05:40

## 2021-10-24 RX ADMIN — LEVETIRACETAM 500 MILLIGRAM(S): 250 TABLET, FILM COATED ORAL at 18:07

## 2021-10-24 RX ADMIN — HALOPERIDOL DECANOATE 2 MILLIGRAM(S): 100 INJECTION INTRAMUSCULAR at 22:11

## 2021-10-24 RX ADMIN — Medication 650 MILLIGRAM(S): at 11:34

## 2021-10-24 RX ADMIN — Medication 1 DROP(S): at 18:05

## 2021-10-24 RX ADMIN — ENOXAPARIN SODIUM 40 MILLIGRAM(S): 100 INJECTION SUBCUTANEOUS at 13:19

## 2021-10-24 RX ADMIN — PIPERACILLIN AND TAZOBACTAM 200 GRAM(S): 4; .5 INJECTION, POWDER, LYOPHILIZED, FOR SOLUTION INTRAVENOUS at 10:34

## 2021-10-24 RX ADMIN — Medication 1 DROP(S): at 05:40

## 2021-10-24 RX ADMIN — PIPERACILLIN AND TAZOBACTAM 25 GRAM(S): 4; .5 INJECTION, POWDER, LYOPHILIZED, FOR SOLUTION INTRAVENOUS at 18:05

## 2021-10-24 RX ADMIN — HALOPERIDOL DECANOATE 1 MILLIGRAM(S): 100 INJECTION INTRAMUSCULAR at 08:29

## 2021-10-24 NOTE — PROGRESS NOTE ADULT - PROBLEM SELECTOR PLAN 2
Patient hypotensive with MAP <65 on admission  - midodrine 10mg q8 weaned to 7.5mg q8 on 10/17 weaned to 5mg q8 for SBP <100 on 10/18  - d/c'd midodrine 10/19, MAPs have been >65 Patient hypotensive with MAP <65 on admission  - midodrine 10mg q8 weaned to 7.5mg q8 on 10/17 weaned to 5mg q8 for SBP <100 on 10/18  - d/c'd midodrine 10/19, MAPs have been >65  - c/w IVF

## 2021-10-24 NOTE — PROGRESS NOTE ADULT - PROBLEM SELECTOR PLAN 4
Home feeding schedule: Jevity 1.5 tube feeds (5 cans for daily feeding, at 6 AM, 10 AM, 2 PM, 6 PM, 10 PM/125 cc of water before and after feeds)  - Nutrition eval appreciated- now at goal feeds per nutrition  - G tube in place per GI, does not need to be replaced  - now on bolus feeds Jevity 1.2 300cc bolus, 4 times per day Home feeding schedule: Jevity 1.5 tube feeds (5 cans for daily feeding, at 6 AM, 10 AM, 2 PM, 6 PM, 10 PM/125 cc of water before and after feeds)  - Nutrition eval appreciated- now at goal feeds per nutrition  - G tube in place per GI, does not need to be replaced  - bolus feeds Jevity 1.2 300cc bolus, 4 times per day, hold for today

## 2021-10-24 NOTE — PROGRESS NOTE ADULT - ATTENDING COMMENTS
55 years old male with seizures, ASD, non verbal,with G tube admiteed with septic shock, Aspiration PNA/Zosyn, completed atb , pending placement .  Last night poss repeat episode of aspiration PNA, sepsis, f/u cultures , CXR, c/w atb,IVF,  Check CT chest if worsening.   Acute hypoxic resp failure, Aspiration PNA c/w oxygen adjust for sat >92 %.   GOC discussion.

## 2021-10-24 NOTE — PROGRESS NOTE ADULT - SUBJECTIVE AND OBJECTIVE BOX
Moo Byrne (Nahum) PGY-3  Pager: NS) [phone]/ (ZPR) [phone]    Patient is a 55y old  Male who presents with a chief complaint of septic shock (23 Oct 2021 07:05)      SUBJECTIVE / OVERNIGHT EVENTS:  No acute events over night. Denies any fevers/chills, headache, CP, SOB, abd pain, N/V/D, constipation, or leg swelling.       MEDICATIONS  (STANDING):  artificial  tears Solution 1 Drop(s) Both EYES every 12 hours  benztropine 0.5 milliGRAM(s) Oral every 12 hours  enoxaparin Injectable 40 milliGRAM(s) SubCutaneous daily  haloperidol    Concentrate 1 milliGRAM(s) Oral every 24 hours  haloperidol    Concentrate 2 milliGRAM(s) Oral every 24 hours  levETIRAcetam  Solution 500 milliGRAM(s) Oral two times a day  polyethylene glycol 3350 17 Gram(s) Oral <User Schedule>  sodium chloride 0.9%. 1000 milliLiter(s) (75 mL/Hr) IV Continuous <Continuous>    MEDICATIONS  (PRN):  ondansetron Injectable 4 milliGRAM(s) IV Push every 8 hours PRN Nausea and/or Vomiting  polyethylene glycol 3350 17 Gram(s) Oral daily PRN Constipation          OBJECTIVE:  Vital Signs Last 24 Hrs  T(C): 37 (24 Oct 2021 05:38), Max: 37 (24 Oct 2021 05:38)  T(F): 98.6 (24 Oct 2021 05:38), Max: 98.6 (24 Oct 2021 05:38)  HR: 111 (24 Oct 2021 05:38) (109 - 115)  BP: 112/68 (24 Oct 2021 05:38) (97/66 - 112/68)  BP(mean): --  RR: 19 (24 Oct 2021 05:38) (18 - 20)  SpO2: 94% (24 Oct 2021 05:38) (90% - 95%)  PHYSICAL EXAM:  GENERAL: NAD, well-developed  HEAD:  Atraumatic, Normocephalic  EYES: conjunctiva and sclera clear  NECK: Supple, No JVD  CHEST/LUNG: Clear to auscultation bilaterally; No wheeze  HEART: Regular rate and rhythm; No murmurs, rubs, or gallops  ABDOMEN: Soft, Nontender, Nondistended; Bowel sounds present  EXTREMITIES:  No clubbing, cyanosis, or edema  PSYCH: AAOx3  NEUROLOGY: non-focal  SKIN: No rashes or lesions    CAPILLARY BLOOD GLUCOSE        I&O's Summary    23 Oct 2021 07:01  -  24 Oct 2021 07:00  --------------------------------------------------------  IN: 500 mL / OUT: 700 mL / NET: -200 mL              LABS:                        14.8   16.11 )-----------( 398      ( 23 Oct 2021 23:13 )             43.8     10-23    144  |  106  |  52<H>  ----------------------------<  115<H>  4.4   |  26  |  0.62    Ca    9.6      23 Oct 2021 23:13    TPro  7.8  /  Alb  3.5  /  TBili  0.2  /  DBili  x   /  AST  34  /  ALT  41  /  AlkPhos  74  10-23                  RADIOLOGY & ADDITIONAL TESTS:       Moo Byrne (Nahum) PGY-3  Pager: NS) [phone]/ (RQX) [phone]    Patient is a 55y old  Male who presents with a chief complaint of septic shock (23 Oct 2021 07:05)      SUBJECTIVE / OVERNIGHT EVENTS:  was hypoxemic to 88 on RA overnight, SBP low 90s, placed on NRB, TF held. Given 1 L NS bolus  on 5L NC now, saturating 97%. Febrile to 102F this AM, tachy to 105.   as per nurse, patient had secretions and gurgling overnight, suctioned    MEDICATIONS  (STANDING):  artificial  tears Solution 1 Drop(s) Both EYES every 12 hours  benztropine 0.5 milliGRAM(s) Oral every 12 hours  enoxaparin Injectable 40 milliGRAM(s) SubCutaneous daily  haloperidol    Concentrate 1 milliGRAM(s) Oral every 24 hours  haloperidol    Concentrate 2 milliGRAM(s) Oral every 24 hours  levETIRAcetam  Solution 500 milliGRAM(s) Oral two times a day  polyethylene glycol 3350 17 Gram(s) Oral <User Schedule>  sodium chloride 0.9%. 1000 milliLiter(s) (75 mL/Hr) IV Continuous <Continuous>    MEDICATIONS  (PRN):  ondansetron Injectable 4 milliGRAM(s) IV Push every 8 hours PRN Nausea and/or Vomiting  polyethylene glycol 3350 17 Gram(s) Oral daily PRN Constipation          OBJECTIVE:  Vital Signs Last 24 Hrs  T(C): 37 (24 Oct 2021 05:38), Max: 37 (24 Oct 2021 05:38)  T(F): 98.6 (24 Oct 2021 05:38), Max: 98.6 (24 Oct 2021 05:38)  HR: 111 (24 Oct 2021 05:38) (109 - 115)  BP: 112/68 (24 Oct 2021 05:38) (97/66 - 112/68)  BP(mean): --  RR: 19 (24 Oct 2021 05:38) (18 - 20)  SpO2: 94% (24 Oct 2021 05:38) (90% - 95%)  PHYSICAL EXAM:  GENERAL: no acute distress, on NC  HEAD:  Atraumatic, Normocephalic  MOUTH: clear  EYES: conjunctiva and sclera clear  NECK: Supple, No JVD  CHEST/LUNG: Clear to auscultation bilaterally; No wheeze  HEART: No murmurs, rubs, or gallops, tachy  ABDOMEN: Soft, Nontender, Nondistended; Bowel sounds present  EXTREMITIES:  No clubbing, cyanosis, or edema  PSYCH: AAOx0, non-verbal. eyes open, alert  SKIN: No rashes or lesions    CAPILLARY BLOOD GLUCOSE        I&O's Summary    23 Oct 2021 07:01  -  24 Oct 2021 07:00  --------------------------------------------------------  IN: 500 mL / OUT: 700 mL / NET: -200 mL              LABS:                        14.8   16.11 )-----------( 398      ( 23 Oct 2021 23:13 )             43.8     10-23    144  |  106  |  52<H>  ----------------------------<  115<H>  4.4   |  26  |  0.62    Ca    9.6      23 Oct 2021 23:13    TPro  7.8  /  Alb  3.5  /  TBili  0.2  /  DBili  x   /  AST  34  /  ALT  41  /  AlkPhos  74  10-23                  RADIOLOGY & ADDITIONAL TESTS:

## 2021-10-24 NOTE — PROGRESS NOTE ADULT - PROBLEM SELECTOR PLAN 1
Presented with cough, fever and new oxygen requirement after observed aspiration event from g-tube feeds and CTAP with alveolar opacities   - completed course of zosyn x7days (10/14-10/20)  - chest PT   - now saturating well on room air   - blood cultures and urine cultures no growth Presented with cough, fever and new oxygen requirement after observed aspiration event from g-tube feeds and CTAP with alveolar opacities   - completed course of zosyn x7days (10/14-10/20)  - chest PT   - blood cultures and urine cultures no growth    -Improved initially, overnight possible re-aspiration, meets sepsis criteria (febrile, inc wbc, tachy)  -repeat bld clx, UA, Clx, CXR overnight clear to my eye, MRSA, RVP swab  -zosyn (10/24- )  -If not improving, will consider CT chest  -c/w IVF  -NPO for today

## 2021-10-25 LAB
CULTURE RESULTS: SIGNIFICANT CHANGE UP
SPECIMEN SOURCE: SIGNIFICANT CHANGE UP

## 2021-10-25 PROCEDURE — 71045 X-RAY EXAM CHEST 1 VIEW: CPT | Mod: 26

## 2021-10-25 PROCEDURE — 99232 SBSQ HOSP IP/OBS MODERATE 35: CPT | Mod: GC

## 2021-10-25 RX ADMIN — ENOXAPARIN SODIUM 40 MILLIGRAM(S): 100 INJECTION SUBCUTANEOUS at 11:00

## 2021-10-25 RX ADMIN — Medication 0.5 MILLIGRAM(S): at 06:03

## 2021-10-25 RX ADMIN — LEVETIRACETAM 500 MILLIGRAM(S): 250 TABLET, FILM COATED ORAL at 06:03

## 2021-10-25 RX ADMIN — Medication 1 DROP(S): at 18:34

## 2021-10-25 RX ADMIN — PIPERACILLIN AND TAZOBACTAM 25 GRAM(S): 4; .5 INJECTION, POWDER, LYOPHILIZED, FOR SOLUTION INTRAVENOUS at 18:34

## 2021-10-25 RX ADMIN — Medication 650 MILLIGRAM(S): at 11:33

## 2021-10-25 RX ADMIN — LEVETIRACETAM 500 MILLIGRAM(S): 250 TABLET, FILM COATED ORAL at 18:33

## 2021-10-25 RX ADMIN — PIPERACILLIN AND TAZOBACTAM 25 GRAM(S): 4; .5 INJECTION, POWDER, LYOPHILIZED, FOR SOLUTION INTRAVENOUS at 10:34

## 2021-10-25 RX ADMIN — Medication 650 MILLIGRAM(S): at 10:33

## 2021-10-25 RX ADMIN — PIPERACILLIN AND TAZOBACTAM 25 GRAM(S): 4; .5 INJECTION, POWDER, LYOPHILIZED, FOR SOLUTION INTRAVENOUS at 01:06

## 2021-10-25 RX ADMIN — HALOPERIDOL DECANOATE 1 MILLIGRAM(S): 100 INJECTION INTRAMUSCULAR at 10:59

## 2021-10-25 RX ADMIN — POLYETHYLENE GLYCOL 3350 17 GRAM(S): 17 POWDER, FOR SOLUTION ORAL at 18:34

## 2021-10-25 RX ADMIN — HALOPERIDOL DECANOATE 2 MILLIGRAM(S): 100 INJECTION INTRAMUSCULAR at 20:30

## 2021-10-25 RX ADMIN — Medication 0.5 MILLIGRAM(S): at 18:34

## 2021-10-25 RX ADMIN — Medication 1 DROP(S): at 06:02

## 2021-10-25 NOTE — PROGRESS NOTE ADULT - ATTENDING COMMENTS
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) admitted with septic shock 2/2 aspiration PNA s/p improvement, now with recurrent fever on 10/24 back on IV abx, presumed aspiration event (other infectious w/u neg to date)     # Aspiration: s/p treatment for aspiration PNA with zosyn, now with recurrent fever and O2 requirement suspect recurrent event, no other ways to mitigate event as on TF with HOB elevation  - c/w zosyn  - seems to be improving, O2 requirements decreasing    # [facility]:  [facility]    Group home stating patient was ambulatory prior to admission, given decrease of muscle wasting and inability to follow commands seems unlikely.  Needs cannot be met a prior group home, planned for LTC.   Currently on IV abx.

## 2021-10-25 NOTE — PROGRESS NOTE ADULT - PROBLEM SELECTOR PLAN 4
Home feeding schedule: Jevity 1.5 tube feeds (5 cans for daily feeding, at 6 AM, 10 AM, 2 PM, 6 PM, 10 PM/125 cc of water before and after feeds)  - Nutrition eval appreciated- now at goal feeds per nutrition  - G tube in place per GI, does not need to be replaced  - bolus feeds Jevity 1.2 300cc bolus, 4 times per day, hold for today

## 2021-10-25 NOTE — PROGRESS NOTE ADULT - PROBLEM SELECTOR PLAN 1
Presented with cough, fever and new oxygen requirement after observed aspiration event from g-tube feeds and CTAP with alveolar opacities   - completed course of zosyn x7days (10/14-10/20)  - chest PT   - blood cultures and urine cultures no growth    -Improved initially, overnight possible re-aspiration, meets sepsis criteria (febrile, inc wbc, tachy)  -repeat bld clx, UA, Clx, CXR overnight clear to my eye, MRSA, RVP swab  -zosyn (10/24- )  -If not improving, will consider CT chest  -c/w IVF  -f/u CXR this am  -NPO for today

## 2021-10-25 NOTE — PROGRESS NOTE ADULT - PROBLEM SELECTOR PLAN 2
Patient hypotensive with MAP <65 on admission  - midodrine 10mg q8 weaned to 7.5mg q8 on 10/17 weaned to 5mg q8 for SBP <100 on 10/18  - d/c'd midodrine 10/19, MAPs have been >65  - c/w IVF

## 2021-10-25 NOTE — PROGRESS NOTE ADULT - SUBJECTIVE AND OBJECTIVE BOX
Progress Note    PIOTR MURPHY 55y (1965) Male [id number]  10-14-21 (11d)    Dr. Yang Ornelas, EM/IM PGY2  Pager# [phone]    Chief Complaint: septic shock    Subjective:  No acute events overnight. Patient seen and examined at bedside.    Review of Systems:  CONSTITUTIONAL: No fever, weight loss, or fatigue  EYES: No eye pain, visual disturbances, or discharge  ENMT:  No difficulty hearing, tinnitus, vertigo; No sinus or throat pain  NECK: No pain or stiffness  RESPIRATORY: No cough, wheezing, chills or hemoptysis; No shortness of breath  CARDIOVASCULAR: No chest pain, palpitations, dizziness, or leg swelling  GASTROINTESTINAL: No abdominal or epigastric pain. No nausea, vomiting, or hematemesis; No diarrhea or constipation. No melena or hematochezia.  GENITOURINARY: No dysuria, frequency, hematuria, or incontinence  NEUROLOGICAL: No headaches, memory loss, loss of strength, numbness, or tremors  SKIN: No itching, burning, rashes, or lesions   MUSCULOSKELETAL: No joint pain or swelling; No muscle, back, or extremity pain      PAST MEDICAL & SURGICAL HISTORY:  Autism disorder [F84.0]    Seizure disorder [G40.909]    No significant past surgical history [[id number]]    Colostomy in place [Z93.3]  2021    S/P exploratory laparotomy [Z98.890]  2021    S/P partial colectomy [Z90.49]  sigmoid colectomy 2021      acetaminophen    Suspension .. 650 milliGRAM(s) Oral every 6 hours PRN  artificial  tears Solution 1 Drop(s) Both EYES every 12 hours  benztropine 0.5 milliGRAM(s) Oral every 12 hours  enoxaparin Injectable 40 milliGRAM(s) SubCutaneous daily  haloperidol    Concentrate 1 milliGRAM(s) Oral every 24 hours  haloperidol    Concentrate 2 milliGRAM(s) Oral every 24 hours  levETIRAcetam  Solution 500 milliGRAM(s) Oral two times a day  ondansetron Injectable 4 milliGRAM(s) IV Push every 8 hours PRN  piperacillin/tazobactam IVPB.. 3.375 Gram(s) IV Intermittent every 8 hours  polyethylene glycol 3350 17 Gram(s) Oral daily PRN  polyethylene glycol 3350 17 Gram(s) Oral <User Schedule>  sodium chloride 0.9%. 1000 milliLiter(s) IV Continuous <Continuous>    Objective:  T(C): 36.9 (10-25-21 @ 05:59), Max: 38.9 (10-24-21 @ 09:57)  HR: 98 (10-25-21 @ 05:59) (81 - 105)  BP: 96/51 (10-25-21 @ 05:59) (96/51 - 108/69)  RR: 20 (10-25-21 @ 05:59) (20 - 20)  SpO2: 96% (10-25-21 @ 05:59) (94% - 100%)    Physical exam:  GENERAL: NAD, well-developed  HEAD:  Atraumatic, Normocephalic  EYES: EOMI, PERRLA, conjunctiva and sclera clear  NECK: Supple, No JVD  CHEST/LUNG: Clear to auscultation bilaterally; No wheeze  HEART: Regular rate and rhythm; No murmurs, rubs, or gallops  ABDOMEN: Soft, Nontender, Nondistended; Bowel sounds present  EXTREMITIES:  2+ Peripheral Pulses, No clubbing, cyanosis, or edema  PSYCH: AAOx3  NEUROLOGY: non-focal  SKIN: No rashes or lesions      10-24-21 @ 07:01  -  10-25-21 @ 07:00  --------------------------------------------------------  IN: 500 mL / OUT: 0 mL / NET: 500 mL        CAPILLARY BLOOD GLUCOSE      (10-24 @ 11:05)                      13.6  23.04 )-----------( 329                 41.1    Neutrophils = 21.18 (91.9%)  Lymphocytes = 1.11 (4.8%)  Eosinophils = 0.00 (0.0%)  Basophils = 0.04 (0.2%)  Monocytes = 0.58 (2.5%)  Bands = --%    10-24    144  |  104  |  45<H>  ----------------------------<  136<H>  4.0   |  28  |  0.64    Ca    9.4      24 Oct 2021 11:05    TPro  7.4  /  Alb  3.3  /  TBili  0.4  /  DBili  x   /  AST  25  /  ALT  31  /  AlkPhos  67  10-24          RVP:(10-24 @ 10:51)  NotDetec    Urinalysis Basic - ( 24 Oct 2021 15:20 )    Color: Yellow / Appearance: Clear / S.037 / pH: x  Gluc: x / Ketone: Negative  / Bili: Negative / Urobili: <2 mg/dL   Blood: x / Protein: 30 mg/dL / Nitrite: Negative   Leuk Esterase: Negative / RBC: 2 /HPF / WBC 2 /HPF   Sq Epi: x / Non Sq Epi: 1 /HPF / Bacteria: Negative        WBC Trend: 23.04<--, 16.11<--, 8.33<--    Hb Trend: 13.6<--, 14.8<--, 13.1<--        New imaging in last 24 hours:  Consult notes reviewed:

## 2021-10-26 DIAGNOSIS — R13.10 DYSPHAGIA, UNSPECIFIED: ICD-10-CM

## 2021-10-26 DIAGNOSIS — J69.0 PNEUMONITIS DUE TO INHALATION OF FOOD AND VOMIT: ICD-10-CM

## 2021-10-26 DIAGNOSIS — R53.2 FUNCTIONAL QUADRIPLEGIA: ICD-10-CM

## 2021-10-26 DIAGNOSIS — Z51.5 ENCOUNTER FOR PALLIATIVE CARE: ICD-10-CM

## 2021-10-26 LAB
ALBUMIN SERPL ELPH-MCNC: 3.2 G/DL — LOW (ref 3.3–5)
ALP SERPL-CCNC: 61 U/L — SIGNIFICANT CHANGE UP (ref 40–120)
ALT FLD-CCNC: 22 U/L — SIGNIFICANT CHANGE UP (ref 4–41)
ANION GAP SERPL CALC-SCNC: 12 MMOL/L — SIGNIFICANT CHANGE UP (ref 7–14)
AST SERPL-CCNC: 21 U/L — SIGNIFICANT CHANGE UP (ref 4–40)
BASOPHILS # BLD AUTO: 0.02 K/UL — SIGNIFICANT CHANGE UP (ref 0–0.2)
BASOPHILS NFR BLD AUTO: 0.2 % — SIGNIFICANT CHANGE UP (ref 0–2)
BILIRUB SERPL-MCNC: 0.3 MG/DL — SIGNIFICANT CHANGE UP (ref 0.2–1.2)
BUN SERPL-MCNC: 31 MG/DL — HIGH (ref 7–23)
CALCIUM SERPL-MCNC: 8.6 MG/DL — SIGNIFICANT CHANGE UP (ref 8.4–10.5)
CHLORIDE SERPL-SCNC: 111 MMOL/L — HIGH (ref 98–107)
CO2 SERPL-SCNC: 26 MMOL/L — SIGNIFICANT CHANGE UP (ref 22–31)
CREAT SERPL-MCNC: 0.55 MG/DL — SIGNIFICANT CHANGE UP (ref 0.5–1.3)
EOSINOPHIL # BLD AUTO: 0.01 K/UL — SIGNIFICANT CHANGE UP (ref 0–0.5)
EOSINOPHIL NFR BLD AUTO: 0.1 % — SIGNIFICANT CHANGE UP (ref 0–6)
GLUCOSE SERPL-MCNC: 103 MG/DL — HIGH (ref 70–99)
HCT VFR BLD CALC: 37.1 % — LOW (ref 39–50)
HGB BLD-MCNC: 12.1 G/DL — LOW (ref 13–17)
IANC: 9.18 K/UL — HIGH (ref 1.5–8.5)
IMM GRANULOCYTES NFR BLD AUTO: 0.4 % — SIGNIFICANT CHANGE UP (ref 0–1.5)
LYMPHOCYTES # BLD AUTO: 1.13 K/UL — SIGNIFICANT CHANGE UP (ref 1–3.3)
LYMPHOCYTES # BLD AUTO: 10.4 % — LOW (ref 13–44)
MAGNESIUM SERPL-MCNC: 2.5 MG/DL — SIGNIFICANT CHANGE UP (ref 1.6–2.6)
MCHC RBC-ENTMCNC: 31.6 PG — SIGNIFICANT CHANGE UP (ref 27–34)
MCHC RBC-ENTMCNC: 32.6 GM/DL — SIGNIFICANT CHANGE UP (ref 32–36)
MCV RBC AUTO: 96.9 FL — SIGNIFICANT CHANGE UP (ref 80–100)
MONOCYTES # BLD AUTO: 0.53 K/UL — SIGNIFICANT CHANGE UP (ref 0–0.9)
MONOCYTES NFR BLD AUTO: 4.9 % — SIGNIFICANT CHANGE UP (ref 2–14)
NEUTROPHILS # BLD AUTO: 9.18 K/UL — HIGH (ref 1.8–7.4)
NEUTROPHILS NFR BLD AUTO: 84 % — HIGH (ref 43–77)
NRBC # BLD: 0 /100 WBCS — SIGNIFICANT CHANGE UP
NRBC # FLD: 0 K/UL — SIGNIFICANT CHANGE UP
PHOSPHATE SERPL-MCNC: 2.9 MG/DL — SIGNIFICANT CHANGE UP (ref 2.5–4.5)
PLATELET # BLD AUTO: 282 K/UL — SIGNIFICANT CHANGE UP (ref 150–400)
POTASSIUM SERPL-MCNC: 3.3 MMOL/L — LOW (ref 3.5–5.3)
POTASSIUM SERPL-SCNC: 3.3 MMOL/L — LOW (ref 3.5–5.3)
PROCALCITONIN SERPL-MCNC: 0.15 NG/ML — HIGH (ref 0.02–0.1)
PROT SERPL-MCNC: 7.1 G/DL — SIGNIFICANT CHANGE UP (ref 6–8.3)
RBC # BLD: 3.83 M/UL — LOW (ref 4.2–5.8)
RBC # FLD: 13.2 % — SIGNIFICANT CHANGE UP (ref 10.3–14.5)
SODIUM SERPL-SCNC: 149 MMOL/L — HIGH (ref 135–145)
WBC # BLD: 10.91 K/UL — HIGH (ref 3.8–10.5)
WBC # FLD AUTO: 10.91 K/UL — HIGH (ref 3.8–10.5)

## 2021-10-26 PROCEDURE — 99223 1ST HOSP IP/OBS HIGH 75: CPT | Mod: GC

## 2021-10-26 PROCEDURE — 99232 SBSQ HOSP IP/OBS MODERATE 35: CPT | Mod: GC

## 2021-10-26 RX ORDER — HALOPERIDOL DECANOATE 100 MG/ML
0.5 INJECTION INTRAMUSCULAR
Refills: 0 | Status: DISCONTINUED | OUTPATIENT
Start: 2021-10-26 | End: 2021-11-10

## 2021-10-26 RX ORDER — POTASSIUM CHLORIDE 20 MEQ
40 PACKET (EA) ORAL ONCE
Refills: 0 | Status: COMPLETED | OUTPATIENT
Start: 2021-10-26 | End: 2021-10-26

## 2021-10-26 RX ORDER — ENOXAPARIN SODIUM 100 MG/ML
40 INJECTION SUBCUTANEOUS DAILY
Refills: 0 | Status: DISCONTINUED | OUTPATIENT
Start: 2021-10-26 | End: 2021-11-10

## 2021-10-26 RX ORDER — HALOPERIDOL DECANOATE 100 MG/ML
1 INJECTION INTRAMUSCULAR
Refills: 0 | Status: DISCONTINUED | OUTPATIENT
Start: 2021-10-26 | End: 2021-11-10

## 2021-10-26 RX ADMIN — Medication 1 DROP(S): at 06:28

## 2021-10-26 RX ADMIN — Medication 40 MILLIEQUIVALENT(S): at 09:31

## 2021-10-26 RX ADMIN — PIPERACILLIN AND TAZOBACTAM 25 GRAM(S): 4; .5 INJECTION, POWDER, LYOPHILIZED, FOR SOLUTION INTRAVENOUS at 01:54

## 2021-10-26 RX ADMIN — PIPERACILLIN AND TAZOBACTAM 25 GRAM(S): 4; .5 INJECTION, POWDER, LYOPHILIZED, FOR SOLUTION INTRAVENOUS at 17:54

## 2021-10-26 RX ADMIN — HALOPERIDOL DECANOATE 1 MILLIGRAM(S): 100 INJECTION INTRAMUSCULAR at 20:13

## 2021-10-26 RX ADMIN — Medication 1 DROP(S): at 17:52

## 2021-10-26 RX ADMIN — HALOPERIDOL DECANOATE 1 MILLIGRAM(S): 100 INJECTION INTRAMUSCULAR at 09:32

## 2021-10-26 RX ADMIN — LEVETIRACETAM 500 MILLIGRAM(S): 250 TABLET, FILM COATED ORAL at 06:28

## 2021-10-26 RX ADMIN — PIPERACILLIN AND TAZOBACTAM 25 GRAM(S): 4; .5 INJECTION, POWDER, LYOPHILIZED, FOR SOLUTION INTRAVENOUS at 09:31

## 2021-10-26 RX ADMIN — ENOXAPARIN SODIUM 40 MILLIGRAM(S): 100 INJECTION SUBCUTANEOUS at 12:18

## 2021-10-26 RX ADMIN — LEVETIRACETAM 500 MILLIGRAM(S): 250 TABLET, FILM COATED ORAL at 17:52

## 2021-10-26 RX ADMIN — Medication 0.5 MILLIGRAM(S): at 17:52

## 2021-10-26 RX ADMIN — Medication 0.5 MILLIGRAM(S): at 06:28

## 2021-10-26 NOTE — PROGRESS NOTE ADULT - PROBLEM SELECTOR PLAN 4
Home feeding schedule: Jevity 1.5 tube feeds (5 cans for daily feeding, at 6 AM, 10 AM, 2 PM, 6 PM, 10 PM/125 cc of water before and after feeds)  - Nutrition eval appreciated- now at goal feeds per nutrition  - G tube in place per GI, does not need to be replaced  - bolus feeds Jevity 1.2 300cc bolus, 4 times per day, hold for today Home feeding schedule: Jevity 1.5 tube feeds (5 cans for daily feeding, at 6 AM, 10 AM, 2 PM, 6 PM, 10 PM/125 cc of water before and after feeds)  - G tube in place per GI, does not need to be replaced  - feeds held and started at half volume after aspiration event 10/24  - 10/26 back to full feeds: Jevity 1.2 300cc bolus, 4 times per day

## 2021-10-26 NOTE — PROGRESS NOTE ADULT - PROBLEM SELECTOR PLAN 1
Presented with cough, fever and new oxygen requirement after observed aspiration event from g-tube feeds and CTAP with alveolar opacities   - completed course of zosyn x7days (10/14-10/20)  - chest PT   - blood cultures and urine cultures no growth    -Improved initially, overnight possible re-aspiration, meets sepsis criteria (febrile, inc wbc, tachy)  -repeat bld clx, UA, Clx, CXR overnight clear to my eye, MRSA, RVP swab  -zosyn (10/24- )  -If not improving, will consider CT chest  -c/w IVF  -f/u CXR this am  -NPO for today Presented with cough, fever and new oxygen requirement after observed aspiration event from g-tube feeds and CTAP with alveolar opacities   - completed course of zosyn x7days (10/14-10/20)  - chest PT   - blood cultures and urine cultures 10/14 no growth    - possible re-aspiration event on 10/24, patient febrile, increased WBC count and tachycardic   - blood and urine cultures from 10/24 NGTD  - CXR 10/25 with new patchy right lower lung opacities  -zosyn (10/24- )

## 2021-10-26 NOTE — CONSULT NOTE ADULT - PROBLEM SELECTOR RECOMMENDATION 3
kps 10-20%  pt was walking up stairs prior but now unable  pt will not be able to return to group home due to pt's need  this decline is consistent with his progressive dementia and now has become advanced with a FAST Score 7c+  pt will now need snf care  family aware

## 2021-10-26 NOTE — PROGRESS NOTE ADULT - ASSESSMENT
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 Patient now with a second episode of aspiration pneumonia, treating accordingly.

## 2021-10-26 NOTE — PROGRESS NOTE ADULT - SUBJECTIVE AND OBJECTIVE BOX
PROGRESS NOTE:   Authored by Helena Thurston MD PGY-1  Pager [phone] [facility], [address] [facility]   Please page night float  after 7PM    Patient is a 55y old  Male who presents with a chief complaint of septic shock (25 Oct 2021 07:27)      SUBJECTIVE / OVERNIGHT EVENTS:    ADDITIONAL REVIEW OF SYSTEMS:    MEDICATIONS  (STANDING):  artificial  tears Solution 1 Drop(s) Both EYES every 12 hours  benztropine 0.5 milliGRAM(s) Oral every 12 hours  enoxaparin Injectable 40 milliGRAM(s) SubCutaneous daily  haloperidol    Concentrate 1 milliGRAM(s) Oral every 24 hours  haloperidol    Concentrate 2 milliGRAM(s) Oral every 24 hours  levETIRAcetam  Solution 500 milliGRAM(s) Oral two times a day  piperacillin/tazobactam IVPB.. 3.375 Gram(s) IV Intermittent every 8 hours  polyethylene glycol 3350 17 Gram(s) Oral <User Schedule>    MEDICATIONS  (PRN):  acetaminophen    Suspension .. 650 milliGRAM(s) Oral every 6 hours PRN Temp greater or equal to 38C (100.4F), Mild Pain (1 - 3)  ondansetron Injectable 4 milliGRAM(s) IV Push every 8 hours PRN Nausea and/or Vomiting  polyethylene glycol 3350 17 Gram(s) Oral daily PRN Constipation      CAPILLARY BLOOD GLUCOSE        I&O's Summary    25 Oct 2021 07:01  -  26 Oct 2021 07:00  --------------------------------------------------------  IN: 1700 mL / OUT: 500 mL / NET: 1200 mL        PHYSICAL EXAM:  Vital Signs Last 24 Hrs  T(C): 36.6 (26 Oct 2021 04:10), Max: 38.1 (25 Oct 2021 10:15)  T(F): 97.9 (26 Oct 2021 04:10), Max: 100.6 (25 Oct 2021 10:15)  HR: 92 (26 Oct 2021 04:10) (82 - 101)  BP: 100/60 (26 Oct 2021 04:10) (94/50 - 112/67)  BP(mean): --  RR: 18 (26 Oct 2021 04:10) (18 - 20)  SpO2: 93% (26 Oct 2021 04:10) (93% - 98%)    CONSTITUTIONAL: NAD, well-developed  RESPIRATORY: Normal respiratory effort; lungs are clear to auscultation bilaterally  CARDIOVASCULAR: Regular rate and rhythm, normal S1 and S2, no murmur/rub/gallop; No lower extremity edema; Peripheral pulses are 2+ bilaterally  ABDOMEN: Nontender to palpation, normoactive bowel sounds, no rebound/guarding  MUSCULOSKELETAL: no clubbing or cyanosis of digits; no joint swelling or tenderness to palpation  PSYCH: A+O to person, place, and time; affect appropriate    LABS:                        12.1   x     )-----------( 282      ( 26 Oct 2021 07:04 )             37.1     10-24    144  |  104  |  45<H>  ----------------------------<  136<H>  4.0   |  28  |  0.64    Ca    9.4      24 Oct 2021 11:05    TPro  7.4  /  Alb  3.3  /  TBili  0.4  /  DBili  x   /  AST  25  /  ALT  31  /  AlkPhos  67  10-24          Urinalysis Basic - ( 24 Oct 2021 15:20 )    Color: Yellow / Appearance: Clear / S.037 / pH: x  Gluc: x / Ketone: Negative  / Bili: Negative / Urobili: <2 mg/dL   Blood: x / Protein: 30 mg/dL / Nitrite: Negative   Leuk Esterase: Negative / RBC: 2 /HPF / WBC 2 /HPF   Sq Epi: x / Non Sq Epi: 1 /HPF / Bacteria: Negative        Culture - Blood (collected 24 Oct 2021 15:01)  Source: .Blood Blood-Peripheral  Preliminary Report (25 Oct 2021 16:01):    No growth to date.    Culture - Blood (collected 24 Oct 2021 15:01)  Source: .Blood Blood  Preliminary Report (25 Oct 2021 16:01):    No growth to date.    Culture - Urine (collected 24 Oct 2021 14:55)  Source: Clean Catch Clean Catch (Midstream)  Final Report (25 Oct 2021 12:00):    <10,000 CFU/mL Normal Urogenital Yael        RADIOLOGY & ADDITIONAL TESTS:  Results Reviewed:   Imaging Personally Reviewed:  Electrocardiogram Personally Reviewed:    COORDINATION OF CARE:  Care Discussed with Consultants/Other Providers [Y/N]:  Prior or Outpatient Records Reviewed [Y/N]:   PROGRESS NOTE:   Authored by Helena Thurston MD PGY-1  Pager [phone] [facility], [address] [facility]   Please page night float  after 7PM    Patient is a 55y old  Male who presents with a chief complaint of septic shock (25 Oct 2021 07:27)      SUBJECTIVE / OVERNIGHT EVENTS: No acute events overnight. This AM patient seen and examined at bedside. ROS limited as patient is nonverbal at baseline.     ADDITIONAL REVIEW OF SYSTEMS: negative     MEDICATIONS  (STANDING):  artificial  tears Solution 1 Drop(s) Both EYES every 12 hours  benztropine 0.5 milliGRAM(s) Oral every 12 hours  enoxaparin Injectable 40 milliGRAM(s) SubCutaneous daily  haloperidol    Concentrate 1 milliGRAM(s) Oral every 24 hours  haloperidol    Concentrate 2 milliGRAM(s) Oral every 24 hours  levETIRAcetam  Solution 500 milliGRAM(s) Oral two times a day  piperacillin/tazobactam IVPB.. 3.375 Gram(s) IV Intermittent every 8 hours  polyethylene glycol 3350 17 Gram(s) Oral <User Schedule>    MEDICATIONS  (PRN):  acetaminophen    Suspension .. 650 milliGRAM(s) Oral every 6 hours PRN Temp greater or equal to 38C (100.4F), Mild Pain (1 - 3)  ondansetron Injectable 4 milliGRAM(s) IV Push every 8 hours PRN Nausea and/or Vomiting  polyethylene glycol 3350 17 Gram(s) Oral daily PRN Constipation      CAPILLARY BLOOD GLUCOSE        I&O's Summary    25 Oct 2021 07:01  -  26 Oct 2021 07:00  --------------------------------------------------------  IN: 1700 mL / OUT: 500 mL / NET: 1200 mL        PHYSICAL EXAM:  Vital Signs Last 24 Hrs  T(C): 36.6 (26 Oct 2021 04:10), Max: 38.1 (25 Oct 2021 10:15)  T(F): 97.9 (26 Oct 2021 04:10), Max: 100.6 (25 Oct 2021 10:15)  HR: 92 (26 Oct 2021 04:10) (82 - 101)  BP: 100/60 (26 Oct 2021 04:10) (94/50 - 112/67)  BP(mean): --  RR: 18 (26 Oct 2021 04:10) (18 - 20)  SpO2: 93% (26 Oct 2021 04:10) (93% - 98%)    CONSTITUTIONAL: comfortable, not in acute distress, cachetic  EYES: No conjunctival or scleral injection, non-icteric;   RESPIRATORY: no cough, lungs with slight bibasilar crackles   CARDIOVASCULAR: +S1S2, RRR, no M/G/R; pedal pulses full and symmetric; no lower extremity edema  GASTROINTESTINAL: No palpable masses or tenderness, +BS throughout, no rebound/guarding; no hepatosplenomegaly; gtube and colostomy in place  SKIN: No rashes or ulcers noted  NEUROLOGIC: alert, tracks movement, nonverbal    LABS:                        12.1   x     )-----------( 282      ( 26 Oct 2021 07:04 )             37.1     10-24    144  |  104  |  45<H>  ----------------------------<  136<H>  4.0   |  28  |  0.64    Ca    9.4      24 Oct 2021 11:05    TPro  7.4  /  Alb  3.3  /  TBili  0.4  /  DBili  x   /  AST  25  /  ALT  31  /  AlkPhos  67  10-24          Urinalysis Basic - ( 24 Oct 2021 15:20 )    Color: Yellow / Appearance: Clear / S.037 / pH: x  Gluc: x / Ketone: Negative  / Bili: Negative / Urobili: <2 mg/dL   Blood: x / Protein: 30 mg/dL / Nitrite: Negative   Leuk Esterase: Negative / RBC: 2 /HPF / WBC 2 /HPF   Sq Epi: x / Non Sq Epi: 1 /HPF / Bacteria: Negative        Culture - Blood (collected 24 Oct 2021 15:01)  Source: .Blood Blood-Peripheral  Preliminary Report (25 Oct 2021 16:01):    No growth to date.    Culture - Blood (collected 24 Oct 2021 15:01)  Source: .Blood Blood  Preliminary Report (25 Oct 2021 16:01):    No growth to date.    Culture - Urine (collected 24 Oct 2021 14:55)  Source: Clean Catch Clean Catch (Midstream)  Final Report (25 Oct 2021 12:00):    <10,000 CFU/mL Normal Urogenital Yael        RADIOLOGY & ADDITIONAL TESTS:  Results Reviewed:   Imaging Personally Reviewed:  Electrocardiogram Personally Reviewed:    COORDINATION OF CARE:  Care Discussed with Consultants/Other Providers [Y/N]:  Prior or Outpatient Records Reviewed [Y/N]:   PROGRESS NOTE:   Authored by Helena Thurston MD PGY-1  Pager [phone] [facility], [address] [facility]   Please page night float  after 7PM    Patient is a 55y old  Male who presents with a chief complaint of septic shock (25 Oct 2021 07:27)    SUBJECTIVE / OVERNIGHT EVENTS: No acute events overnight. This AM patient seen and examined at bedside. ROS limited as patient is nonverbal at baseline.     ADDITIONAL REVIEW OF SYSTEMS: negative     MEDICATIONS  (STANDING):  artificial  tears Solution 1 Drop(s) Both EYES every 12 hours  benztropine 0.5 milliGRAM(s) Oral every 12 hours  enoxaparin Injectable 40 milliGRAM(s) SubCutaneous daily  haloperidol    Concentrate 1 milliGRAM(s) Oral every 24 hours  haloperidol    Concentrate 2 milliGRAM(s) Oral every 24 hours  levETIRAcetam  Solution 500 milliGRAM(s) Oral two times a day  piperacillin/tazobactam IVPB.. 3.375 Gram(s) IV Intermittent every 8 hours  polyethylene glycol 3350 17 Gram(s) Oral <User Schedule>    MEDICATIONS  (PRN):  acetaminophen    Suspension .. 650 milliGRAM(s) Oral every 6 hours PRN Temp greater or equal to 38C (100.4F), Mild Pain (1 - 3)  ondansetron Injectable 4 milliGRAM(s) IV Push every 8 hours PRN Nausea and/or Vomiting  polyethylene glycol 3350 17 Gram(s) Oral daily PRN Constipation      I&O's Summary    25 Oct 2021 07:01  -  26 Oct 2021 07:00  --------------------------------------------------------  IN: 1700 mL / OUT: 500 mL / NET: 1200 mL    PHYSICAL EXAM:  Vital Signs Last 24 Hrs  T(C): 36.6 (26 Oct 2021 04:10), Max: 38.1 (25 Oct 2021 10:15)  T(F): 97.9 (26 Oct 2021 04:10), Max: 100.6 (25 Oct 2021 10:15)  HR: 92 (26 Oct 2021 04:10) (82 - 101)  BP: 100/60 (26 Oct 2021 04:10) (94/50 - 112/67)  RR: 18 (26 Oct 2021 04:10) (18 - 20)  SpO2: 93% (26 Oct 2021 04:10) (93% - 98%)    CONSTITUTIONAL: comfortable, not in acute distress, cachetic  EYES: No conjunctival or scleral injection, non-icteric;   RESPIRATORY: no cough, lungs with slight bibasilar crackles   CARDIOVASCULAR: +S1S2, RRR, no M/G/R; pedal pulses full and symmetric; no lower extremity edema  GASTROINTESTINAL: No palpable masses or tenderness, +BS throughout, no rebound/guarding; no hepatosplenomegaly; gtube and colostomy in place  SKIN: No rashes or ulcers noted  NEUROLOGIC: alert, tracks movement, nonverbal    LABS:                        12.1   x     )-----------( 282      ( 26 Oct 2021 07:04 )             37.1     10-24    144  |  104  |  45<H>  ----------------------------<  136<H>  4.0   |  28  |  0.64    Ca    9.4      24 Oct 2021 11:05    TPro  7.4  /  Alb  3.3  /  TBili  0.4  /  DBili  x   /  AST  25  /  ALT  31  /  AlkPhos  67  10-24          Urinalysis Basic - ( 24 Oct 2021 15:20 )    Color: Yellow / Appearance: Clear / S.037 / pH: x  Gluc: x / Ketone: Negative  / Bili: Negative / Urobili: <2 mg/dL   Blood: x / Protein: 30 mg/dL / Nitrite: Negative   Leuk Esterase: Negative / RBC: 2 /HPF / WBC 2 /HPF   Sq Epi: x / Non Sq Epi: 1 /HPF / Bacteria: Negative        Culture - Blood (collected 24 Oct 2021 15:01)  Source: .Blood Blood-Peripheral  Preliminary Report (25 Oct 2021 16:01):    No growth to date.    Culture - Blood (collected 24 Oct 2021 15:01)  Source: .Blood Blood  Preliminary Report (25 Oct 2021 16:01):    No growth to date.    Culture - Urine (collected 24 Oct 2021 14:55)  Source: Clean Catch Clean Catch (Midstream)  Final Report (25 Oct 2021 12:00):    <10,000 CFU/mL Normal Urogenital Yael        RADIOLOGY & ADDITIONAL TESTS:  Results Reviewed:   Imaging Personally Reviewed:  Electrocardiogram Personally Reviewed:    COORDINATION OF CARE:  Care Discussed with Consultants/Other Providers [Y/N]:  Prior or Outpatient Records Reviewed [Y/N]:

## 2021-10-26 NOTE — PROGRESS NOTE ADULT - PROBLEM SELECTOR PLAN 2
Patient hypotensive with MAP <65 on admission  - midodrine 10mg q8 weaned to 7.5mg q8 on 10/17 weaned to 5mg q8 for SBP <100 on 10/18  - d/c'd midodrine 10/19, MAPs have been >65  - c/w IVF Patient hypotensive with MAP <65 on admission  - midodrine 10mg q8 weaned to 7.5mg q8 on 10/17 weaned to 5mg q8 for SBP <100 on 10/18  - d/c'd midodrine 10/19, MAPs have been >65

## 2021-10-26 NOTE — CONSULT NOTE ADULT - PROBLEM SELECTOR RECOMMENDATION 4
overall prognosis is poor due to his progressive decline due to his dementia with it now being ES  discussed this with family and started discussion on AD  family to discuss and will follow up  ultimately due to pt's protection from [facility], will need to reach out to them once family makes decision to make ultimate decision re: AD

## 2021-10-26 NOTE — CONSULT NOTE ADULT - PROBLEM SELECTOR RECOMMENDATION 9
iv abx as per primary team  aspiration precautions  pt at risk for further aspiration   discussed with brother who now understands risk of aspiration including intubation   he had never thought about this before and will need to discuss with family

## 2021-10-26 NOTE — PROGRESS NOTE ADULT - ATTENDING COMMENTS
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) admitted with septic shock 2/2 aspiration PNA s/p improvement, now with recurrent fever on 10/24 back on IV abx, presumed aspiration event (other infectious w/u neg to date)     # Aspiration: s/p treatment for aspiration PNA with zosyn, now with recurrent fever and O2 requirement suspect recurrent event, no other ways to mitigate event as on TF with HOB elevation  - c/w zosyn, D3/5  - check procal  - seems to be improving, O2 requirements decreasing    # Hypernatremia:   - increase TF back to goal    # Hypokalemia  - replete     # Seizure disorder:  - continue Keppra    Group home stating patient was ambulatory prior to admission, given decrease of muscle wasting and inability to follow commands seems unlikely.  Needs cannot be met a prior group home, planned for LTC.   Currently on IV abx.  Appreciate [facility] alonzo d/w Dr. Wells, family was not aware of what patients overall recent decline meant, will convene to d/w patients mothers re: [facility]

## 2021-10-26 NOTE — CONSULT NOTE ADULT - SUBJECTIVE AND OBJECTIVE BOX
HPI:  Danie Olivera is a 55M with history of autism with intellectual developmenal developmental delay (nonverbal at baseline), remote history of seizures (on keppra), sigmoid volvulus (2021 s/p ex-lap and ostomy), g-tube placement in 2021 who was brought in from his group home with low oxygen after vomiting G-tube feed, and moaning, concern for aspiration. Per discussion, with [facility] group [place] ([phone]), patient has been coughing up phlegm since his g-tube placement in  of this year. Since then has had multiple ED visits for coughing and concern for pneumonia, found to have clear CXR and afebrile.     At midnight the night before presentation, he was observed to be regurgitating g-tube formula, with coughing and concern for aspiration. This has never happened in the past, despite coughing more frequently. The feeds were paused and patient brought to ED for evaluation. No fevers or chills. No abdominal pain, no constipation.    On arrival in the ED, he was febrile to 101.7, , /66, RR 22, satting 85% on RA. Blood pressure decreased to 78/53, fluid resuscitated with 4L and started on levophed. WBC with 11.05 with neutrophilic predominance. CXR with right lung airspace opacity. CTAP with RLL consolidation and patchy airspace opacity in RML. MICU consulted, not a MICU candidate.  Gave midodrine 20 mg, with appropriate reponse    He was admitted to Medicine for evaluation and management of septic shock due to aspiration PNA (14 Oct 2021 17:47)    Pt awake, non verbal.  +oral secretions    PERTINENT PM/SXH:   Autism disorder    Seizure disorder      No significant past surgical history    Colostomy in place    S/P exploratory laparotomy    S/P partial colectomy      FAMILY HISTORY:    ITEMS NOT CHECKED ARE NOT PRESENT    SOCIAL HISTORY:   Significant other/partner:  [ ]  Children:  [ ]  Buddhism/Spirituality:  Substance hx:  [ ]   Tobacco hx:  [ ]   Alcohol hx: [ ]   Home Opioid hx:  [ ] I-Stop Reference No:  Living Situation: [ ]Home  [ ]Long term care  [ ]Rehab [ x]Other group home    ADVANCE DIRECTIVES:    DNR  MOLST  [ ]  Living Will  [ ]   DECISION MAKER(s):  [ ] Health Care Proxy(s)  [ ] Surrogate(s)  [ ] Guardian           Name(s): Phone Number(s):  milly Estrella    BASELINE (I)ADL(s) (prior to admission):  [place]: [ ]Total  [ ] Moderate [x ]Dependent    Allergies    No Known Allergies    Intolerances    MEDICATIONS  (STANDING):  artificial  tears Solution 1 Drop(s) Both EYES every 12 hours  benztropine 0.5 milliGRAM(s) Oral every 12 hours  enoxaparin Injectable 40 milliGRAM(s) SubCutaneous daily  haloperidol    Concentrate 1 milliGRAM(s) Oral <User Schedule>  haloperidol    Concentrate 1 milliGRAM(s) Oral every 24 hours  levETIRAcetam  Solution 500 milliGRAM(s) Oral two times a day  piperacillin/tazobactam IVPB.. 3.375 Gram(s) IV Intermittent every 8 hours  polyethylene glycol 3350 17 Gram(s) Oral <User Schedule>    MEDICATIONS  (PRN):  acetaminophen    Suspension .. 650 milliGRAM(s) Oral every 6 hours PRN Temp greater or equal to 38C (100.4F), Mild Pain (1 - 3)  ondansetron Injectable 4 milliGRAM(s) IV Push every 8 hours PRN Nausea and/or Vomiting  polyethylene glycol 3350 17 Gram(s) Oral daily PRN Constipation    PRESENT SYMPTOMS: [x ]Unable to obtain due to poor mentation   Source if other than patient:  [ ]Family   [ ]Team     Pain: [ ] yes [ ] no  QOL impact -   Location -                    Aggravating factors -  Quality -  Radiation -  Timing-  Severity (0-10 scale):  Minimal acceptable level (0-10 scale):     PAIN AD Score:     http://geriatrictoolkit.[place].[facility]/cog/painad.pdf (press ctrl +  left click to view)    Dyspnea:                           [ ]Mild [ ]Moderate [ ]Severe  Anxiety:                             [ ]Mild [ ]Moderate [ ]Severe  Fatigue:                             [ ]Mild [ ]Moderate [ ]Severe  Nausea:                             [ ]Mild [ ]Moderate [ ]Severe  Loss of appetite:              [ ]Mild [ ]Moderate [ ]Severe  Constipation:                    [ ]Mild [ ]Moderate [ ]Severe    Other Symptoms:  [ ]All other review of systems negative     Karnofsky Performance Score/Palliative Performance Status Version 2:  10       %    http://npcrc.org/files/news/palliative_performance_scale_ppsv2.pdf  PHYSICAL EXAM:  Vital Signs Last 24 Hrs  T(C): 36.8 (26 Oct 2021 12:15), Max: 36.8 (26 Oct 2021 12:15)  T(F): 98.2 (26 Oct 2021 12:15), Max: 98.2 (26 Oct 2021 12:15)  HR: 98 (26 Oct 2021 12:15) (84 - 101)  BP: 104/63 (26 Oct 2021 12:15) (100/60 - 112/67)  BP(mean): --  RR: 19 (26 Oct 2021 12:15) (18 - 20)  SpO2: 93% (26 Oct 2021 12:15) (93% - 95%) I&O's Summary    25 Oct 2021 07:01  -  26 Oct 2021 07:00  --------------------------------------------------------  IN: 1700 mL / OUT: 500 mL / NET: 1200 mL    26 Oct 2021 07:01  -  26 Oct 2021 15:01  --------------------------------------------------------  IN: 250 mL / OUT: 0 mL / NET: 250 mL    GENERAL:  [ x]Alert  [ ]Oriented x   [ ]Lethargic  [ ]Cachexia  [ ]Unarousable  [ ]Verbal  [ x]Non-Verbal  Behavioral:   [ ] Anxiety  [ ] Delirium [ ] Agitation [ ] Other  HEENT:  [ ]Normal   [ ]Dry mouth   [ ]ET Tube/Trach  [x ]Oral lesions  PULMONARY:   [ ]Clear [ ]Tachypnea  [x ]Audible excessive secretions   [x ]Rhonchi        [ ]Right [ ]Left [ x]Bilateral  [ ]Crackles        [ ]Right [ ]Left [ ]Bilateral  [ ]Wheezing     [ ]Right [ ]Left [ ]Bilateral  CARDIOVASCULAR:    [ ]Regular [ ]Irregular [x ]Tachy  [ ]Buddy [ ]Murmur [ ]Other  GASTROINTESTINAL:  [x ]Soft  [ ]Distended   [x ]+BS  [ x]Non tender [ ]Tender  [ x]PEG [ ]OGT/ NGT  Last BM: GENITOURINARY:  [ ]Normal [ ] Incontinent   [ ]Oliguria/Anuria   [x ][place]iam  MUSCULOSKELETAL:   [ ]Normal   [ ]Weakness  [x ]Bed/Wheelchair bound [ ]Edema  NEUROLOGIC:   [ ]No focal deficits  [x ] Cognitive impairment  [x ] Dysphagia [ ]Dysarthria [ ] Paresis [ ]Other   SKIN:   [ ]Normal   [x ]Pressure ulcer(s)  [ ]Rash    CRITICAL CARE:  [ ] Shock Present  [ ]Septic [ ]Cardiogenic [ ]Neurologic [ ]Hypovolemic  [ ]  Vasopressors [ ]  Inotropes   [ ] Respiratory failure present [ ] mechanical ventilation [ ] non-invasive ventilatory support [ ] High flow  [ ] Acute  [ ] Chronic [ ] Hypoxic  [ ] Hypercarbic [ ] Other  [ ] Other organ failure     LABS:                        12.1   10.91 )-----------( 282      ( 26 Oct 2021 07:04 )             37.1   10-    149<H>  |  111<H>  |  31<H>  ----------------------------<  103<H>  3.3<L>   |  26  |  0.55    Ca    8.6      26 Oct 2021 07:04  Phos  2.9     10-  Mg     2.50     10-    TPro  7.1  /  Alb  3.2<L>  /  TBili  0.3  /  DBili  x   /  AST  21  /  ALT  22  /  AlkPhos  61  10-      Urinalysis Basic - ( 24 Oct 2021 15:20 )    Color: Yellow / Appearance: Clear / S.037 / pH: x  Gluc: x / Ketone: Negative  / Bili: Negative / Urobili: <2 mg/dL   Blood: x / Protein: 30 mg/dL / Nitrite: Negative   Leuk Esterase: Negative / RBC: 2 /HPF / WBC 2 /HPF   Sq Epi: x / Non Sq Epi: 1 /HPF / Bacteria: Negative      RADIOLOGY & ADDITIONAL STUDIES:    PROTEIN CALORIE MALNUTRITION PRESENT: [ ] Yes [ ] No  [ ] PPSV2 < or = to 30% [ ] significant weight loss  [ ] poor nutritional intake [ ] catabolic state [ ] anasarca     Artificial Nutrition [ ]     REFERRALS:   [ ]Chaplaincy  [ ] Hospice  [ ]Child Life  [ ]Social Work  [ ]Case management [ ]Holistic Therapy     Goals of Care Document:   Care Coordination Assessment 201 [C. Provider] (10-15-21 @ 15:52)   Progress Notes - Care Coordination [C. Provider] (10-25-21 @ 12:51)

## 2021-10-26 NOTE — CONSULT NOTE ADULT - ASSESSMENT
55 year old man with PMH autism, seizure disorder, volovulus s/p ostomy, PEG presented after vomiting and found to be hypoxic concerning for aspiration pneumonia. Given hypotension ultimately concerned for sepsis. Patient was adequately fluid resuscitated although bedside POCUS reveals apical lungs without evidence of edema but IVC >2.2cm.    Recs:  -would wean off levophed onto midodrine as tolerated. Can increase dose to up to 30mg tid as needed if no bradycardia.  -would hold off on giving more fluids at this time  -c/w antibiotics for pneumonia.     Patient is not a MICU candidate at this time    Discussed with Dr Villafuerte
55M with history of autism with intellectual developmenal developmental delay (nonverbal at baseline), remote history of seizures (on keppra), sigmoid volvulus (1/2021 s/p ex-lap and ostomy), g-tube placement in 6/2021 who was brought in from his group home with low oxygen after vomiting G-tube feed, and moaning, concern for aspiration. Per discussion, with [facility] group home ([phone]), patient has been coughing up phlegm since his g-tube placement in June of this year. Since then has had multiple ED visits for coughing and concern for pneumonia, found to have clear CXR and afebrile.  Asked to see for goc

## 2021-10-27 LAB
ALBUMIN SERPL ELPH-MCNC: 3 G/DL — LOW (ref 3.3–5)
ALP SERPL-CCNC: 62 U/L — SIGNIFICANT CHANGE UP (ref 40–120)
ALT FLD-CCNC: 22 U/L — SIGNIFICANT CHANGE UP (ref 4–41)
ANION GAP SERPL CALC-SCNC: 13 MMOL/L — SIGNIFICANT CHANGE UP (ref 7–14)
AST SERPL-CCNC: 22 U/L — SIGNIFICANT CHANGE UP (ref 4–40)
BASOPHILS # BLD AUTO: 0 K/UL — SIGNIFICANT CHANGE UP (ref 0–0.2)
BASOPHILS NFR BLD AUTO: 0 % — SIGNIFICANT CHANGE UP (ref 0–2)
BILIRUB SERPL-MCNC: 0.3 MG/DL — SIGNIFICANT CHANGE UP (ref 0.2–1.2)
BUN SERPL-MCNC: 28 MG/DL — HIGH (ref 7–23)
CALCIUM SERPL-MCNC: 9.1 MG/DL — SIGNIFICANT CHANGE UP (ref 8.4–10.5)
CHLORIDE SERPL-SCNC: 110 MMOL/L — HIGH (ref 98–107)
CO2 SERPL-SCNC: 26 MMOL/L — SIGNIFICANT CHANGE UP (ref 22–31)
CREAT SERPL-MCNC: 0.54 MG/DL — SIGNIFICANT CHANGE UP (ref 0.5–1.3)
EOSINOPHIL # BLD AUTO: 0.01 K/UL — SIGNIFICANT CHANGE UP (ref 0–0.5)
EOSINOPHIL NFR BLD AUTO: 0.1 % — SIGNIFICANT CHANGE UP (ref 0–6)
GLUCOSE SERPL-MCNC: 99 MG/DL — SIGNIFICANT CHANGE UP (ref 70–99)
HCT VFR BLD CALC: 38.4 % — LOW (ref 39–50)
HGB BLD-MCNC: 12.5 G/DL — LOW (ref 13–17)
IANC: 5.69 K/UL — SIGNIFICANT CHANGE UP (ref 1.5–8.5)
IMM GRANULOCYTES NFR BLD AUTO: 0.3 % — SIGNIFICANT CHANGE UP (ref 0–1.5)
LYMPHOCYTES # BLD AUTO: 1.14 K/UL — SIGNIFICANT CHANGE UP (ref 1–3.3)
LYMPHOCYTES # BLD AUTO: 15.8 % — SIGNIFICANT CHANGE UP (ref 13–44)
MAGNESIUM SERPL-MCNC: 2.6 MG/DL — SIGNIFICANT CHANGE UP (ref 1.6–2.6)
MCHC RBC-ENTMCNC: 31.7 PG — SIGNIFICANT CHANGE UP (ref 27–34)
MCHC RBC-ENTMCNC: 32.6 GM/DL — SIGNIFICANT CHANGE UP (ref 32–36)
MCV RBC AUTO: 97.5 FL — SIGNIFICANT CHANGE UP (ref 80–100)
MONOCYTES # BLD AUTO: 0.36 K/UL — SIGNIFICANT CHANGE UP (ref 0–0.9)
MONOCYTES NFR BLD AUTO: 5 % — SIGNIFICANT CHANGE UP (ref 2–14)
NEUTROPHILS # BLD AUTO: 5.69 K/UL — SIGNIFICANT CHANGE UP (ref 1.8–7.4)
NEUTROPHILS NFR BLD AUTO: 78.8 % — HIGH (ref 43–77)
NRBC # BLD: 0 /100 WBCS — SIGNIFICANT CHANGE UP
NRBC # FLD: 0 K/UL — SIGNIFICANT CHANGE UP
PHOSPHATE SERPL-MCNC: 3.1 MG/DL — SIGNIFICANT CHANGE UP (ref 2.5–4.5)
PLATELET # BLD AUTO: 297 K/UL — SIGNIFICANT CHANGE UP (ref 150–400)
POTASSIUM SERPL-MCNC: 3.6 MMOL/L — SIGNIFICANT CHANGE UP (ref 3.5–5.3)
POTASSIUM SERPL-SCNC: 3.6 MMOL/L — SIGNIFICANT CHANGE UP (ref 3.5–5.3)
PROT SERPL-MCNC: 6.8 G/DL — SIGNIFICANT CHANGE UP (ref 6–8.3)
RBC # BLD: 3.94 M/UL — LOW (ref 4.2–5.8)
RBC # FLD: 13.3 % — SIGNIFICANT CHANGE UP (ref 10.3–14.5)
SODIUM SERPL-SCNC: 149 MMOL/L — HIGH (ref 135–145)
WBC # BLD: 7.22 K/UL — SIGNIFICANT CHANGE UP (ref 3.8–10.5)
WBC # FLD AUTO: 7.22 K/UL — SIGNIFICANT CHANGE UP (ref 3.8–10.5)

## 2021-10-27 PROCEDURE — 99232 SBSQ HOSP IP/OBS MODERATE 35: CPT | Mod: GC

## 2021-10-27 RX ADMIN — PIPERACILLIN AND TAZOBACTAM 25 GRAM(S): 4; .5 INJECTION, POWDER, LYOPHILIZED, FOR SOLUTION INTRAVENOUS at 22:01

## 2021-10-27 RX ADMIN — ENOXAPARIN SODIUM 40 MILLIGRAM(S): 100 INJECTION SUBCUTANEOUS at 11:34

## 2021-10-27 RX ADMIN — PIPERACILLIN AND TAZOBACTAM 25 GRAM(S): 4; .5 INJECTION, POWDER, LYOPHILIZED, FOR SOLUTION INTRAVENOUS at 02:03

## 2021-10-27 RX ADMIN — LEVETIRACETAM 500 MILLIGRAM(S): 250 TABLET, FILM COATED ORAL at 05:49

## 2021-10-27 RX ADMIN — Medication 1 DROP(S): at 17:56

## 2021-10-27 RX ADMIN — PIPERACILLIN AND TAZOBACTAM 25 GRAM(S): 4; .5 INJECTION, POWDER, LYOPHILIZED, FOR SOLUTION INTRAVENOUS at 13:50

## 2021-10-27 RX ADMIN — Medication 1 DROP(S): at 05:49

## 2021-10-27 RX ADMIN — Medication 0.5 MILLIGRAM(S): at 05:49

## 2021-10-27 RX ADMIN — HALOPERIDOL DECANOATE 0.5 MILLIGRAM(S): 100 INJECTION INTRAMUSCULAR at 13:51

## 2021-10-27 RX ADMIN — LEVETIRACETAM 500 MILLIGRAM(S): 250 TABLET, FILM COATED ORAL at 17:56

## 2021-10-27 RX ADMIN — Medication 0.5 MILLIGRAM(S): at 17:56

## 2021-10-27 RX ADMIN — HALOPERIDOL DECANOATE 1 MILLIGRAM(S): 100 INJECTION INTRAMUSCULAR at 22:00

## 2021-10-27 NOTE — PROGRESS NOTE ADULT - SUBJECTIVE AND OBJECTIVE BOX
PROGRESS NOTE:   Authored by Helena Thurston MD PGY-1  Pager [phone] [facility], [id number] [facility]   Please page night float  after 7PM    Patient is a 55y old  Male who presents with a chief complaint of septic shock (26 Oct 2021 15:00)      SUBJECTIVE / OVERNIGHT EVENTS:    ADDITIONAL REVIEW OF SYSTEMS:    MEDICATIONS  (STANDING):  artificial  tears Solution 1 Drop(s) Both EYES every 12 hours  benztropine 0.5 milliGRAM(s) Oral every 12 hours  enoxaparin Injectable 40 milliGRAM(s) SubCutaneous daily  haloperidol    Concentrate 0.5 milliGRAM(s) Oral <User Schedule>  haloperidol    Concentrate 1 milliGRAM(s) Oral <User Schedule>  levETIRAcetam  Solution 500 milliGRAM(s) Oral two times a day  piperacillin/tazobactam IVPB.. 3.375 Gram(s) IV Intermittent every 8 hours  polyethylene glycol 3350 17 Gram(s) Oral <User Schedule>    MEDICATIONS  (PRN):  acetaminophen    Suspension .. 650 milliGRAM(s) Oral every 6 hours PRN Temp greater or equal to 38C (100.4F), Mild Pain (1 - 3)  ondansetron Injectable 4 milliGRAM(s) IV Push every 8 hours PRN Nausea and/or Vomiting  polyethylene glycol 3350 17 Gram(s) Oral daily PRN Constipation      CAPILLARY BLOOD GLUCOSE        I&O's Summary    26 Oct 2021 07:01  -  27 Oct 2021 07:00  --------------------------------------------------------  IN: 2000 mL / OUT: 0 mL / NET: 2000 mL        PHYSICAL EXAM:  Vital Signs Last 24 Hrs  T(C): 36.3 (27 Oct 2021 05:00), Max: 36.9 (26 Oct 2021 17:17)  T(F): 97.4 (27 Oct 2021 05:00), Max: 98.4 (26 Oct 2021 17:17)  HR: 103 (27 Oct 2021 05:00) (96 - 103)  BP: 101/57 (27 Oct 2021 05:00) (96/51 - 104/63)  BP(mean): --  RR: 18 (27 Oct 2021 05:00) (18 - 19)  SpO2: 93% (27 Oct 2021 05:00) (92% - 93%)    CONSTITUTIONAL: NAD, well-developed  RESPIRATORY: Normal respiratory effort; lungs are clear to auscultation bilaterally  CARDIOVASCULAR: Regular rate and rhythm, normal S1 and S2, no murmur/rub/gallop; No lower extremity edema; Peripheral pulses are 2+ bilaterally  ABDOMEN: Nontender to palpation, normoactive bowel sounds, no rebound/guarding  MUSCULOSKELETAL: no clubbing or cyanosis of digits; no joint swelling or tenderness to palpation  PSYCH: A+O to person, place, and time; affect appropriate    LABS:                        12.1   10.91 )-----------( 282      ( 26 Oct 2021 07:04 )             37.1     10-26    149<H>  |  111<H>  |  31<H>  ----------------------------<  103<H>  3.3<L>   |  26  |  0.55    Ca    8.6      26 Oct 2021 07:04  Phos  2.9     10-26  Mg     2.50     10-26    TPro  7.1  /  Alb  3.2<L>  /  TBili  0.3  /  DBili  x   /  AST  21  /  ALT  22  /  AlkPhos  61  10-26              Culture - Blood (collected 24 Oct 2021 15:01)  Source: .Blood Blood-Peripheral  Preliminary Report (25 Oct 2021 16:01):    No growth to date.    Culture - Blood (collected 24 Oct 2021 15:01)  Source: .Blood Blood  Preliminary Report (25 Oct 2021 16:01):    No growth to date.    Culture - Urine (collected 24 Oct 2021 14:55)  Source: Clean Catch Clean Catch (Midstream)  Final Report (25 Oct 2021 12:00):    <10,000 CFU/mL Normal Urogenital Yael        RADIOLOGY & ADDITIONAL TESTS:  Results Reviewed:   Imaging Personally Reviewed:  Electrocardiogram Personally Reviewed:    COORDINATION OF CARE:  Care Discussed with Consultants/Other Providers [Y/N]:  Prior or Outpatient Records Reviewed [Y/N]:   PROGRESS NOTE:   Authored by Helena Thurston MD PGY-1  Pager [phone] [facility], [address] [facility]   Please page night float  after 7PM    Patient is a 55y old  Male who presents with a chief complaint of septic shock (26 Oct 2021 15:00)      SUBJECTIVE / OVERNIGHT EVENTS: No acute events overnight. This AM patient seen and examined at bedside. ROS limited as patient is nonverbal. Patient was off oxygen during exam, oxygen saturation 92-93%. Patient was then placed back on 1L of oxygen by nursing. No cough.    ADDITIONAL REVIEW OF SYSTEMS: negative     MEDICATIONS  (STANDING):  artificial  tears Solution 1 Drop(s) Both EYES every 12 hours  benztropine 0.5 milliGRAM(s) Oral every 12 hours  enoxaparin Injectable 40 milliGRAM(s) SubCutaneous daily  haloperidol    Concentrate 0.5 milliGRAM(s) Oral <User Schedule>  haloperidol    Concentrate 1 milliGRAM(s) Oral <User Schedule>  levETIRAcetam  Solution 500 milliGRAM(s) Oral two times a day  piperacillin/tazobactam IVPB.. 3.375 Gram(s) IV Intermittent every 8 hours  polyethylene glycol 3350 17 Gram(s) Oral <User Schedule>    MEDICATIONS  (PRN):  acetaminophen    Suspension .. 650 milliGRAM(s) Oral every 6 hours PRN Temp greater or equal to 38C (100.4F), Mild Pain (1 - 3)  ondansetron Injectable 4 milliGRAM(s) IV Push every 8 hours PRN Nausea and/or Vomiting  polyethylene glycol 3350 17 Gram(s) Oral daily PRN Constipation      CAPILLARY BLOOD GLUCOSE        I&O's Summary    26 Oct 2021 07:01  -  27 Oct 2021 07:00  --------------------------------------------------------  IN: 2000 mL / OUT: 0 mL / NET: 2000 mL        PHYSICAL EXAM:  Vital Signs Last 24 Hrs  T(C): 36.3 (27 Oct 2021 05:00), Max: 36.9 (26 Oct 2021 17:17)  T(F): 97.4 (27 Oct 2021 05:00), Max: 98.4 (26 Oct 2021 17:17)  HR: 103 (27 Oct 2021 05:00) (96 - 103)  BP: 101/57 (27 Oct 2021 05:00) (96/51 - 104/63)  BP(mean): --  RR: 18 (27 Oct 2021 05:00) (18 - 19)  SpO2: 93% (27 Oct 2021 05:00) (92% - 93%)    CONSTITUTIONAL: comfortable, not in acute distress, cachetic  EYES: No conjunctival or scleral injection, non-icteric;   RESPIRATORY: no cough, lungs with slight bibasilar crackles   CARDIOVASCULAR: +S1S2, RRR, no M/G/R; pedal pulses full and symmetric; no lower extremity edema  GASTROINTESTINAL: No palpable masses or tenderness, +BS throughout, no rebound/guarding; no hepatosplenomegaly; gtube and colostomy in place  SKIN: No rashes or ulcers noted  NEUROLOGIC: alert, tracks movement, nonverbal    LABS:                        12.1   10.91 )-----------( 282      ( 26 Oct 2021 07:04 )             37.1     10-26    149<H>  |  111<H>  |  31<H>  ----------------------------<  103<H>  3.3<L>   |  26  |  0.55    Ca    8.6      26 Oct 2021 07:04  Phos  2.9     10-26  Mg     2.50     10-26    TPro  7.1  /  Alb  3.2<L>  /  TBili  0.3  /  DBili  x   /  AST  21  /  ALT  22  /  AlkPhos  61  10-26              Culture - Blood (collected 24 Oct 2021 15:01)  Source: .Blood Blood-Peripheral  Preliminary Report (25 Oct 2021 16:01):    No growth to date.    Culture - Blood (collected 24 Oct 2021 15:01)  Source: .Blood Blood  Preliminary Report (25 Oct 2021 16:01):    No growth to date.    Culture - Urine (collected 24 Oct 2021 14:55)  Source: Clean Catch Clean Catch (Midstream)  Final Report (25 Oct 2021 12:00):    <10,000 CFU/mL Normal Urogenital Yael        RADIOLOGY & ADDITIONAL TESTS:  Results Reviewed:   Imaging Personally Reviewed:  Electrocardiogram Personally Reviewed:    COORDINATION OF CARE:  Care Discussed with Consultants/Other Providers [Y/N]:  Prior or Outpatient Records Reviewed [Y/N]:   PROGRESS NOTE:   Authored by Helena Thurston MD PGY-1  Pager [phone] [facility], [address] [facility]   Please page night float  after 7PM    Patient is a 55y old  Male who presents with a chief complaint of septic shock (26 Oct 2021 15:00)    SUBJECTIVE / OVERNIGHT EVENTS: No acute events overnight. This AM patient seen and examined at bedside. ROS limited as patient is nonverbal. Patient was off oxygen during exam, oxygen saturation 92-93%. Patient was then placed back on 1L of oxygen by nursing. No cough.    ADDITIONAL REVIEW OF SYSTEMS: negative     MEDICATIONS  (STANDING):  artificial  tears Solution 1 Drop(s) Both EYES every 12 hours  benztropine 0.5 milliGRAM(s) Oral every 12 hours  enoxaparin Injectable 40 milliGRAM(s) SubCutaneous daily  haloperidol    Concentrate 0.5 milliGRAM(s) Oral <User Schedule>  haloperidol    Concentrate 1 milliGRAM(s) Oral <User Schedule>  levETIRAcetam  Solution 500 milliGRAM(s) Oral two times a day  piperacillin/tazobactam IVPB.. 3.375 Gram(s) IV Intermittent every 8 hours  polyethylene glycol 3350 17 Gram(s) Oral <User Schedule>    MEDICATIONS  (PRN):  acetaminophen    Suspension .. 650 milliGRAM(s) Oral every 6 hours PRN Temp greater or equal to 38C (100.4F), Mild Pain (1 - 3)  ondansetron Injectable 4 milliGRAM(s) IV Push every 8 hours PRN Nausea and/or Vomiting  polyethylene glycol 3350 17 Gram(s) Oral daily PRN Constipation      CAPILLARY BLOOD GLUCOSE        I&O's Summary    26 Oct 2021 07:01  -  27 Oct 2021 07:00  --------------------------------------------------------  IN: 2000 mL / OUT: 0 mL / NET: 2000 mL        PHYSICAL EXAM:  Vital Signs Last 24 Hrs  T(C): 36.3 (27 Oct 2021 05:00), Max: 36.9 (26 Oct 2021 17:17)  T(F): 97.4 (27 Oct 2021 05:00), Max: 98.4 (26 Oct 2021 17:17)  HR: 103 (27 Oct 2021 05:00) (96 - 103)  BP: 101/57 (27 Oct 2021 05:00) (96/51 - 104/63)  BP(mean): --  RR: 18 (27 Oct 2021 05:00) (18 - 19)  SpO2: 93% (27 Oct 2021 05:00) (92% - 93%)    CONSTITUTIONAL: comfortable, not in acute distress, cachetic  EYES: No conjunctival or scleral injection, non-icteric;   RESPIRATORY: no cough, lungs with slight bibasilar crackles   CARDIOVASCULAR: +S1S2, RRR, no M/G/R; pedal pulses full and symmetric; no lower extremity edema  GASTROINTESTINAL: No palpable masses or tenderness, +BS throughout, no rebound/guarding; no hepatosplenomegaly; gtube and colostomy in place  SKIN: No rashes or ulcers noted  NEUROLOGIC: alert, tracks movement, nonverbal    LABS:                        12.1   10.91 )-----------( 282      ( 26 Oct 2021 07:04 )             37.1     10-26    149<H>  |  111<H>  |  31<H>  ----------------------------<  103<H>  3.3<L>   |  26  |  0.55    Ca    8.6      26 Oct 2021 07:04  Phos  2.9     10-26  Mg     2.50     10-26    TPro  7.1  /  Alb  3.2<L>  /  TBili  0.3  /  DBili  x   /  AST  21  /  ALT  22  /  AlkPhos  61  10-26              Culture - Blood (collected 24 Oct 2021 15:01)  Source: .Blood Blood-Peripheral  Preliminary Report (25 Oct 2021 16:01):    No growth to date.    Culture - Blood (collected 24 Oct 2021 15:01)  Source: .Blood Blood  Preliminary Report (25 Oct 2021 16:01):    No growth to date.    Culture - Urine (collected 24 Oct 2021 14:55)  Source: Clean Catch Clean Catch (Midstream)  Final Report (25 Oct 2021 12:00):    <10,000 CFU/mL Normal Urogenital Yael        RADIOLOGY & ADDITIONAL TESTS:  Results Reviewed:   Imaging Personally Reviewed:  Electrocardiogram Personally Reviewed:    COORDINATION OF CARE:  Care Discussed with Consultants/Other Providers [Y/N]:  Prior or Outpatient Records Reviewed [Y/N]:

## 2021-10-27 NOTE — PROGRESS NOTE ADULT - ASSESSMENT
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 Patient now with a second episode of aspiration pneumonia, treating accordingly.

## 2021-10-27 NOTE — PROVIDER CONTACT NOTE (OTHER) - BACKGROUND
Patient diagnosed with aspiration pneumonia
pnemonia due to organism
pt is admitted with aspiration pneumonia
pt is admitted with aspiration pneumonia
Patient admitted with Aspiration PNA. Hx of seizure disorder, autism, colostomy and PEG
Patient admitted with Aspiration PNA. Hx of seizure disorder, autism, colostomy and PEG
pnemonia due to organism
patient admitted for septic shock r/t aspiration PNA
pnemonia due to organism

## 2021-10-27 NOTE — PROGRESS NOTE ADULT - PROBLEM SELECTOR PLAN 3
kps 20% significant decline since admission  will not be able to return to group home  plan for placement at [facility]

## 2021-10-27 NOTE — PROVIDER CONTACT NOTE (OTHER) - NAME OF MD/NP/PA/DO NOTIFIED:
Dr. Byrne
Dr. Barone
Hanna Chow
Hanna Chow
 [id number]
 [id number]
Dr. Barone
MD Laboy #[id number]
Yang Ornelas

## 2021-10-27 NOTE — PROGRESS NOTE ADULT - PROBLEM SELECTOR PLAN 2
Patient hypotensive with MAP <65 on admission  - midodrine 10mg q8 weaned to 7.5mg q8 on 10/17 weaned to 5mg q8 for SBP <100 on 10/18  - d/c'd midodrine 10/19, MAPs have been >65 Home feeding schedule: Jevity 1.5 tube feeds (5 cans for daily feeding, at 6 AM, 10 AM, 2 PM, 6 PM, 10 PM/125 cc of water before and after feeds)  - G tube in place per GI, does not need to be replaced  - feeds held and started at half volume after aspiration event 10/24  - 10/26 back to full feeds: Jevity 1.2 300cc bolus, 4 times per day

## 2021-10-27 NOTE — PROVIDER CONTACT NOTE (OTHER) - RECOMMENDATIONS
Dr. Barone notified
Notify MD
notify MD
Give Tylenol as ordered for fever. continue on current antibiotic treatment
Notify MD, continue to monitor
Notify MD, continue to monitor
MD to be notified
Dr. Barone notified
notify MD

## 2021-10-27 NOTE — PROVIDER CONTACT NOTE (OTHER) - ASSESSMENT
BP 95/58 Temp 100.6 rectal. Patient warm to touch. Patient alert nonverbal. No acute distress noted
no signs of acute distress
pt is alert, nonverbal. no distress noted.
Patient on 5L NC. Feels warm to touch
pt is alert, nonverbal. no distress noted.
Monitor vs
Monitor vs
no signs of acute distress
Patients blood pressure 120/73 heart rate 92. Patient alert, moaning, nonverbal

## 2021-10-27 NOTE — PROGRESS NOTE ADULT - PROBLEM SELECTOR PLAN 8
- continue home haloperidol 2 mg/ml (1ml AM, 2 ml PM) thru G tube - c/w half home haloperidol 0.5mg AM, 1mg PM thru G tube

## 2021-10-27 NOTE — PROGRESS NOTE ADULT - SUBJECTIVE AND OBJECTIVE BOX
SUBJECTIVE AND OBJECTIVE:  pt not requiring o2 support.  Appears comfortable.  no sound of oral secreations  INTERVAL HPI/OVERNIGHT EVENTS:    DNR on chart:   Allergies    No Known Allergies    Intolerances    MEDICATIONS  (STANDING):  artificial  tears Solution 1 Drop(s) Both EYES every 12 hours  benztropine 0.5 milliGRAM(s) Oral every 12 hours  enoxaparin Injectable 40 milliGRAM(s) SubCutaneous daily  haloperidol    Concentrate 0.5 milliGRAM(s) Oral <User Schedule>  haloperidol    Concentrate 1 milliGRAM(s) Oral <User Schedule>  levETIRAcetam  Solution 500 milliGRAM(s) Oral two times a day  piperacillin/tazobactam IVPB.. 3.375 Gram(s) IV Intermittent every 8 hours  polyethylene glycol 3350 17 Gram(s) Oral <User Schedule>    MEDICATIONS  (PRN):  acetaminophen    Suspension .. 650 milliGRAM(s) Oral every 6 hours PRN Temp greater or equal to 38C (100.4F), Mild Pain (1 - 3)  ondansetron Injectable 4 milliGRAM(s) IV Push every 8 hours PRN Nausea and/or Vomiting  polyethylene glycol 3350 17 Gram(s) Oral daily PRN Constipation      ITEMS UNCHECKED ARE NOT PRESENT    PRESENT SYMPTOMS: [ x]Unable to obtain due to poor mentation   Source if other than patient:  [ ]Family   [ ]Team     Pain:  [ ]yes [ ]no  QOL impact -   Location -                    Aggravating factors -  Quality -  Radiation -  Timing-  Severity (0-10 scale):  Minimal acceptable level (0-10 scale):     Dyspnea:                           [ ]Mild [ ]Moderate [ ]Severe  Anxiety:                             [ ]Mild [ ]Moderate [ ]Severe  Fatigue:                             [ ]Mild [ ]Moderate [ ]Severe  Nausea:                             [ ]Mild [ ]Moderate [ ]Severe  Loss of appetite:              [ ]Mild [ ]Moderate [ ]Severe  Constipation:                    [ ]Mild [ ]Moderate [ ]Severe    CPOT:    https://www.[facility].org/getattachment/idn79o88-5f1b-3k9f-5e1y-2832x4526g5t/Critical-Care-Pain-Observation-Tool-(CPOT)    PAIN AD Score:	  http://geriatrictoolkit.[facility]/cog/painad.pdf (Ctrl + left click to view)    Other Symptoms:  [ ]All other review of systems negative     Palliative Performance Status Version 2:     20    %      http://[facility].org/files/news/palliative_performance_scale_ppsv2.pdf  PHYSICAL EXAM:  Vital Signs Last 24 Hrs  T(C): 36.6 (27 Oct 2021 12:21), Max: 36.9 (26 Oct 2021 17:17)  T(F): 97.8 (27 Oct 2021 12:21), Max: 98.4 (26 Oct 2021 17:17)  HR: 92 (27 Oct 2021 12:21) (92 - 103)  BP: 100/62 (27 Oct 2021 12:21) (96/51 - 101/60)  BP(mean): --  RR: 18 (27 Oct 2021 12:21) (17 - 18)  SpO2: 93% (27 Oct 2021 12:21) (92% - 93%) I&O's Summary    26 Oct 2021 07:01  -  27 Oct 2021 07:00  --------------------------------------------------------  IN: 2000 mL / OUT: 0 mL / NET: 2000 mL    27 Oct 2021 07:01  -  27 Oct 2021 15:33  --------------------------------------------------------  IN: 500 mL / OUT: 0 mL / NET: 500 mL       GENERAL:  [x ]Alert  [ ]Oriented x   [ ]Lethargic  [ ]Cachexia  [ ]Unarousable  [ ]Verbal  [x ]Non-Verbal  Behavioral:   [ ]Anxiety  [ ]Delirium [ ]Agitation [ ]Other  HEENT:  [x ]Normal   [ ]Dry mouth   [ ]ET Tube/Trach  [ ]Oral lesions  PULMONARY:   [x ]Clear [ ]Tachypnea  [ ]Audible excessive secretions   [ ]Rhonchi        [ ]Right [ ]Left [ ]Bilateral  [ ]Crackles        [ ]Right [ ]Left [ ]Bilateral  [ ]Wheezing     [ ]Right [ ]Left [ ]Bilateral  [ ]Diminished BS [ ] Right [ ]Left [ ]Bilateral  CARDIOVASCULAR:    [x ]Regular [ ]Irregular [ ]Tachy  [ ]Buddy [ ]Murmur [ ]Other  GASTROINTESTINAL:  [x ]Soft  [ ]Distended   [x ]+BS  [ ]Non tender [ ]Tender  [x ]PEG [ ]OGT/ NGT   Last BM:    GENITOURINARY:  [ ]Normal [x ]Incontinent   [ ]Oliguria/Anuria   [ ]Pulliam  MUSCULOSKELETAL:   [ ]Normal   [ ]Weakness  [x ]Bed/Wheelchair bound [ ]Edema  NEUROLOGIC:   [ ]No focal deficits  [x ] Cognitive impairment  [x ] Dysphagia [ ]Dysarthria [ ] Paresis [ ]Other   SKIN:   [x ]Normal  [ ]Rash   [ ]Pressure ulcer(s) [ ]y [ ]n present on admission    CRITICAL CARE:  [ ]Shock Present  [ ]Septic [ ]Cardiogenic [ ]Neurologic [ ]Hypovolemic  [ ]Vasopressors [ ]Inotropes  [ ]Respiratory failure present [ ]Mechanical Ventilation [ ]Non-invasive ventilatory support [ ]High-Flow   [ ]Acute  [ ]Chronic [ ]Hypoxic  [ ]Hypercarbic [ ]Other  [ ]Other organ failure     LABS:                        12.5   7.22  )-----------( 297      ( 27 Oct 2021 08:21 )             38.4   10-27    149<H>  |  110<H>  |  28<H>  ----------------------------<  99  3.6   |  26  |  0.54    Ca    9.1      27 Oct 2021 08:21  Phos  3.1     10-27  Mg     2.60     10-27    TPro  6.8  /  Alb  3.0<L>  /  TBili  0.3  /  DBili  x   /  AST  22  /  ALT  22  /  AlkPhos  62  10-27        RADIOLOGY & ADDITIONAL STUDIES:    Protein Calorie Malnutrition Present: [ ]mild [ ]moderate [ ]severe [ ]underweight [ ]morbid obesity  https://www.andeal.org/vault/9393/web/files/ONC/Table_Clinical%20Characteristics%20to%20Document%20Malnutrition-White%20JV%20et%20al%202012.pdf    Height (cm): 165.1 (10-15-21 @ 03:27), 177.8 (08-31-21 @ 22:32), 177.8 (08-17-21 @ 20:46)  Weight (kg): 54.6 (10-21-21 @ 11:38), 72.6 (08-05-21 @ 19:50), 65 (01-22-21 @ 22:41)  BMI (kg/m2): 20 (10-21-21 @ 11:38), 26.6 (10-15-21 @ 03:27), 23 (08-31-21 @ 22:32)    [ ]PPSV2 < or = 30%  [ ]significant weight loss [ ]poor nutritional intake [ ]anasarca    [ ]Artificial Nutrition    REFERRALS:   [ ]Chaplaincy  [ ]Hospice  [ ]Child Life  [ ]Social Work  [ ]Case management [ ]Holistic Therapy     Goals of Care Document:

## 2021-10-27 NOTE — PROVIDER CONTACT NOTE (OTHER) - ACTION/TREATMENT ORDERED:
MD Laboy made aware, to come assess patient. Nursing care to continue
No action required at this time. Continue to monitor
no recommendation at this time. will continue monitoring.
MD made aware, stated to give Tylenol as ordered
MD made aware, nursing care continued
Immanuel Peralta ordered, urine studies, blood cx's, MRSA swab, RVP ordered
pt is due for midodrine 10 mg. will continue monitoring.
MD made aware, nursing care continued
No action required at this time. Continue to monitor

## 2021-10-27 NOTE — PROVIDER CONTACT NOTE (OTHER) - REASON
BP 96/51
Temp 102.1
pt's bp is 94/58
BP 95/58 Temp 100.6 rectal
BP 99/60
BP 98/60
Patients blood pressure 120/73 heart rate 92
BP 96/51
pt's bp is 94/58

## 2021-10-27 NOTE — PROVIDER CONTACT NOTE (OTHER) - DATE AND TIME:
25-Oct-2021 06:05
25-Oct-2021 10:25
27-Oct-2021 02:33
15-Oct-2021 06:23
18-Oct-2021 23:34
24-Oct-2021 10:00
15-Oct-2021 04:47
26-Oct-2021 21:24
25-Oct-2021 02:05

## 2021-10-27 NOTE — PROGRESS NOTE ADULT - PROBLEM SELECTOR PLAN 1
Presented with cough, fever and new oxygen requirement after observed aspiration event from g-tube feeds and CTAP with alveolar opacities   - completed course of zosyn x7days (10/14-10/20)  - chest PT   - blood cultures and urine cultures 10/14 no growth    - possible re-aspiration event on 10/24, patient febrile, increased WBC count and tachycardic   - blood and urine cultures from 10/24 NGTD  - CXR 10/25 with new patchy right lower lung opacities  -zosyn (10/24- ) Presented with cough, fever and new oxygen requirement after observed aspiration event from g-tube feeds and CTAP with alveolar opacities   - completed course of zosyn x7days (10/14-10/20)  - chest PT   - blood cultures and urine cultures 10/14 no growth    - possible re-aspiration event on 10/24, patient febrile, increased WBC count and tachycardic   - blood and urine cultures from 10/24 NGTD  - CXR 10/25 with new patchy right lower lung opacities  - continue with zosyn (10/24- 10/28), 5 day course

## 2021-10-27 NOTE — PROGRESS NOTE ADULT - ASSESSMENT
55M with history of autism with intellectual developmenal developmental delay (nonverbal at baseline), remote history of seizures (on keppra), sigmoid volvulus (1/2021 s/p ex-lap and ostomy), g-tube placement in 6/2021 who was brought in from his group home with low oxygen after vomiting G-tube feed, and moaning, concern for aspiration. Per discussion, with [facility] group home ([phone]), patient has been coughing up phlegm since his g-tube placement in June of this year. Since then has had multiple ED visits for coughing and concern for pneumonia, found to have clear CXR and afebrile.  Asked to see for goc

## 2021-10-27 NOTE — PROGRESS NOTE ADULT - PROBLEM SELECTOR PLAN 3
Per d/w group home nurse, patient has remote history of seizures, now well controlled  - continue keppra thru G tube Patient hypotensive with MAP <65 on admission  - midodrine 10mg q8 weaned to 7.5mg q8 on 10/17 weaned to 5mg q8 for SBP <100 on 10/18  - d/c'd midodrine 10/19, MAPs have been >65

## 2021-10-27 NOTE — PROGRESS NOTE ADULT - ATTENDING COMMENTS
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) admitted with septic shock 2/2 aspiration PNA s/p improvement, now with recurrent fever on 10/24 back on IV abx, presumed aspiration event (other infectious w/u neg to date)     # Aspiration: s/p treatment for aspiration PNA with zosyn, now with recurrent fever and O2 requirement suspect recurrent event, no other ways to mitigate event as on TF with HOB elevation  - c/w zosyn, D 4/5  - seems to be improving, O2 requirements decreasing    # Hypernatremia:   - increase TF back to goal  - increase free water     # Seizure disorder:  - continue Keppra    Group home stating patient was ambulatory prior to admission, given decrease of muscle wasting and inability to follow commands seems unlikely.  Needs cannot be met a prior group home, planned for LTC.  Appreciate [facility] bita alonzo d/w Dr. Wells, family wants full code at this point  Will be ready for dc to LTC on 10/28 if remains stable

## 2021-10-27 NOTE — PROGRESS NOTE ADULT - CONVERSATION DETAILS
spoke with alea- brother  he spoke with pt's mother and at this time would want all interventions including intubation and cpr  he is happy he is to be placed near them in [place] so they can visit and this may help family understand his progressive decline.

## 2021-10-27 NOTE — PROGRESS NOTE ADULT - PROBLEM SELECTOR PLAN 4
Home feeding schedule: Jevity 1.5 tube feeds (5 cans for daily feeding, at 6 AM, 10 AM, 2 PM, 6 PM, 10 PM/125 cc of water before and after feeds)  - G tube in place per GI, does not need to be replaced  - feeds held and started at half volume after aspiration event 10/24  - 10/26 back to full feeds: Jevity 1.2 300cc bolus, 4 times per day Per d/w group home nurse, patient has remote history of seizures, now well controlled  - continue keppra thru G tube

## 2021-10-27 NOTE — PROVIDER CONTACT NOTE (OTHER) - SITUATION
BP 96/51
BP 98/60
BP 99/60
Patients blood pressure 120/73 heart rate 92
pt's bp is 94/58
pt's bp is 94/58
BP 96/51
BP 95/58 Temp 100.6 rectal
Temp 102.1

## 2021-10-28 LAB
ANION GAP SERPL CALC-SCNC: 10 MMOL/L — SIGNIFICANT CHANGE UP (ref 7–14)
BASOPHILS # BLD AUTO: 0.01 K/UL — SIGNIFICANT CHANGE UP (ref 0–0.2)
BASOPHILS NFR BLD AUTO: 0.2 % — SIGNIFICANT CHANGE UP (ref 0–2)
BUN SERPL-MCNC: 26 MG/DL — HIGH (ref 7–23)
CALCIUM SERPL-MCNC: 8.7 MG/DL — SIGNIFICANT CHANGE UP (ref 8.4–10.5)
CHLORIDE SERPL-SCNC: 108 MMOL/L — HIGH (ref 98–107)
CO2 SERPL-SCNC: 25 MMOL/L — SIGNIFICANT CHANGE UP (ref 22–31)
CREAT SERPL-MCNC: 0.37 MG/DL — LOW (ref 0.5–1.3)
EOSINOPHIL # BLD AUTO: 0.04 K/UL — SIGNIFICANT CHANGE UP (ref 0–0.5)
EOSINOPHIL NFR BLD AUTO: 0.8 % — SIGNIFICANT CHANGE UP (ref 0–6)
GLUCOSE SERPL-MCNC: 92 MG/DL — SIGNIFICANT CHANGE UP (ref 70–99)
HCT VFR BLD CALC: 36.6 % — LOW (ref 39–50)
HGB BLD-MCNC: 12.3 G/DL — LOW (ref 13–17)
IANC: 3.05 K/UL — SIGNIFICANT CHANGE UP (ref 1.5–8.5)
IMM GRANULOCYTES NFR BLD AUTO: 0.2 % — SIGNIFICANT CHANGE UP (ref 0–1.5)
LYMPHOCYTES # BLD AUTO: 1.44 K/UL — SIGNIFICANT CHANGE UP (ref 1–3.3)
LYMPHOCYTES # BLD AUTO: 29.6 % — SIGNIFICANT CHANGE UP (ref 13–44)
MAGNESIUM SERPL-MCNC: 2.4 MG/DL — SIGNIFICANT CHANGE UP (ref 1.6–2.6)
MCHC RBC-ENTMCNC: 32.2 PG — SIGNIFICANT CHANGE UP (ref 27–34)
MCHC RBC-ENTMCNC: 33.6 GM/DL — SIGNIFICANT CHANGE UP (ref 32–36)
MCV RBC AUTO: 95.8 FL — SIGNIFICANT CHANGE UP (ref 80–100)
MONOCYTES # BLD AUTO: 0.31 K/UL — SIGNIFICANT CHANGE UP (ref 0–0.9)
MONOCYTES NFR BLD AUTO: 6.4 % — SIGNIFICANT CHANGE UP (ref 2–14)
NEUTROPHILS # BLD AUTO: 3.05 K/UL — SIGNIFICANT CHANGE UP (ref 1.8–7.4)
NEUTROPHILS NFR BLD AUTO: 62.8 % — SIGNIFICANT CHANGE UP (ref 43–77)
NRBC # BLD: 0 /100 WBCS — SIGNIFICANT CHANGE UP
NRBC # FLD: 0 K/UL — SIGNIFICANT CHANGE UP
PHOSPHATE SERPL-MCNC: 3.2 MG/DL — SIGNIFICANT CHANGE UP (ref 2.5–4.5)
PLATELET # BLD AUTO: 301 K/UL — SIGNIFICANT CHANGE UP (ref 150–400)
POTASSIUM SERPL-MCNC: 4.1 MMOL/L — SIGNIFICANT CHANGE UP (ref 3.5–5.3)
POTASSIUM SERPL-SCNC: 4.1 MMOL/L — SIGNIFICANT CHANGE UP (ref 3.5–5.3)
RBC # BLD: 3.82 M/UL — LOW (ref 4.2–5.8)
RBC # FLD: 13.2 % — SIGNIFICANT CHANGE UP (ref 10.3–14.5)
SODIUM SERPL-SCNC: 143 MMOL/L — SIGNIFICANT CHANGE UP (ref 135–145)
WBC # BLD: 4.86 K/UL — SIGNIFICANT CHANGE UP (ref 3.8–10.5)
WBC # FLD AUTO: 4.86 K/UL — SIGNIFICANT CHANGE UP (ref 3.8–10.5)

## 2021-10-28 PROCEDURE — 99232 SBSQ HOSP IP/OBS MODERATE 35: CPT | Mod: GC

## 2021-10-28 RX ORDER — HALOPERIDOL DECANOATE 100 MG/ML
0.25 INJECTION INTRAMUSCULAR
Qty: 0 | Refills: 0 | DISCHARGE
Start: 2021-10-28

## 2021-10-28 RX ORDER — HALOPERIDOL DECANOATE 100 MG/ML
0.5 INJECTION INTRAMUSCULAR
Qty: 0 | Refills: 0 | DISCHARGE
Start: 2021-10-28

## 2021-10-28 RX ORDER — HALOPERIDOL DECANOATE 100 MG/ML
1 INJECTION INTRAMUSCULAR
Qty: 0 | Refills: 0 | DISCHARGE
Start: 2021-10-28

## 2021-10-28 RX ORDER — ENOXAPARIN SODIUM 100 MG/ML
1 INJECTION SUBCUTANEOUS
Qty: 0 | Refills: 0 | DISCHARGE

## 2021-10-28 RX ORDER — HALOPERIDOL DECANOATE 100 MG/ML
2 INJECTION INTRAMUSCULAR
Qty: 0 | Refills: 0 | DISCHARGE

## 2021-10-28 RX ORDER — HALOPERIDOL DECANOATE 100 MG/ML
1 INJECTION INTRAMUSCULAR
Qty: 0 | Refills: 0 | DISCHARGE

## 2021-10-28 RX ORDER — TAMSULOSIN HYDROCHLORIDE 0.4 MG/1
0.5 CAPSULE ORAL
Qty: 0 | Refills: 0 | DISCHARGE

## 2021-10-28 RX ADMIN — PIPERACILLIN AND TAZOBACTAM 25 GRAM(S): 4; .5 INJECTION, POWDER, LYOPHILIZED, FOR SOLUTION INTRAVENOUS at 14:14

## 2021-10-28 RX ADMIN — Medication 1 DROP(S): at 05:39

## 2021-10-28 RX ADMIN — Medication 1 DROP(S): at 17:22

## 2021-10-28 RX ADMIN — LEVETIRACETAM 500 MILLIGRAM(S): 250 TABLET, FILM COATED ORAL at 05:38

## 2021-10-28 RX ADMIN — HALOPERIDOL DECANOATE 1 MILLIGRAM(S): 100 INJECTION INTRAMUSCULAR at 21:16

## 2021-10-28 RX ADMIN — PIPERACILLIN AND TAZOBACTAM 25 GRAM(S): 4; .5 INJECTION, POWDER, LYOPHILIZED, FOR SOLUTION INTRAVENOUS at 05:38

## 2021-10-28 RX ADMIN — LEVETIRACETAM 500 MILLIGRAM(S): 250 TABLET, FILM COATED ORAL at 17:21

## 2021-10-28 RX ADMIN — ENOXAPARIN SODIUM 40 MILLIGRAM(S): 100 INJECTION SUBCUTANEOUS at 11:53

## 2021-10-28 RX ADMIN — HALOPERIDOL DECANOATE 0.5 MILLIGRAM(S): 100 INJECTION INTRAMUSCULAR at 10:17

## 2021-10-28 RX ADMIN — PIPERACILLIN AND TAZOBACTAM 25 GRAM(S): 4; .5 INJECTION, POWDER, LYOPHILIZED, FOR SOLUTION INTRAVENOUS at 21:16

## 2021-10-28 RX ADMIN — Medication 0.5 MILLIGRAM(S): at 05:38

## 2021-10-28 RX ADMIN — Medication 0.5 MILLIGRAM(S): at 17:22

## 2021-10-28 NOTE — PROGRESS NOTE ADULT - SUBJECTIVE AND OBJECTIVE BOX
PROGRESS NOTE:   Authored by Helena Thurston MD PGY-1  Pager [phone] [facility], [id number] [facility]   Please page night float  after 7PM    Patient is a 55y old  Male who presents with a chief complaint of septic shock (27 Oct 2021 15:33)      SUBJECTIVE / OVERNIGHT EVENTS:    ADDITIONAL REVIEW OF SYSTEMS:    MEDICATIONS  (STANDING):  artificial  tears Solution 1 Drop(s) Both EYES every 12 hours  benztropine 0.5 milliGRAM(s) Oral every 12 hours  enoxaparin Injectable 40 milliGRAM(s) SubCutaneous daily  haloperidol    Concentrate 0.5 milliGRAM(s) Oral <User Schedule>  haloperidol    Concentrate 1 milliGRAM(s) Oral <User Schedule>  levETIRAcetam  Solution 500 milliGRAM(s) Oral two times a day  piperacillin/tazobactam IVPB.. 3.375 Gram(s) IV Intermittent every 8 hours  polyethylene glycol 3350 17 Gram(s) Oral <User Schedule>    MEDICATIONS  (PRN):  acetaminophen    Suspension .. 650 milliGRAM(s) Oral every 6 hours PRN Temp greater or equal to 38C (100.4F), Mild Pain (1 - 3)  ondansetron Injectable 4 milliGRAM(s) IV Push every 8 hours PRN Nausea and/or Vomiting  polyethylene glycol 3350 17 Gram(s) Oral daily PRN Constipation      CAPILLARY BLOOD GLUCOSE        I&O's Summary    27 Oct 2021 07:01  -  28 Oct 2021 07:00  --------------------------------------------------------  IN: 2100 mL / OUT: 300 mL / NET: 1800 mL        PHYSICAL EXAM:  Vital Signs Last 24 Hrs  T(C): 36.8 (28 Oct 2021 05:36), Max: 37.2 (27 Oct 2021 16:00)  T(F): 98.2 (28 Oct 2021 05:36), Max: 98.9 (27 Oct 2021 16:00)  HR: 94 (28 Oct 2021 05:36) (85 - 96)  BP: 90/51 (28 Oct 2021 05:36) (90/51 - 102/65)  BP(mean): --  RR: 17 (28 Oct 2021 05:36) (17 - 18)  SpO2: 95% (28 Oct 2021 05:36) (92% - 97%)    CONSTITUTIONAL: NAD, well-developed  RESPIRATORY: Normal respiratory effort; lungs are clear to auscultation bilaterally  CARDIOVASCULAR: Regular rate and rhythm, normal S1 and S2, no murmur/rub/gallop; No lower extremity edema; Peripheral pulses are 2+ bilaterally  ABDOMEN: Nontender to palpation, normoactive bowel sounds, no rebound/guarding  MUSCULOSKELETAL: no clubbing or cyanosis of digits; no joint swelling or tenderness to palpation  PSYCH: A+O to person, place, and time; affect appropriate    LABS:                        12.5   7.22  )-----------( 297      ( 27 Oct 2021 08:21 )             38.4     10-27    149<H>  |  110<H>  |  28<H>  ----------------------------<  99  3.6   |  26  |  0.54    Ca    9.1      27 Oct 2021 08:21  Phos  3.1     10-27  Mg     2.60     10-27    TPro  6.8  /  Alb  3.0<L>  /  TBili  0.3  /  DBili  x   /  AST  22  /  ALT  22  /  AlkPhos  62  10-27                RADIOLOGY & ADDITIONAL TESTS:  Results Reviewed:   Imaging Personally Reviewed:  Electrocardiogram Personally Reviewed:    COORDINATION OF CARE:  Care Discussed with Consultants/Other Providers [Y/N]:  Prior or Outpatient Records Reviewed [Y/N]:   PROGRESS NOTE:   Authored by Helena Thurston MD PGY-1  Pager [phone] [facility], [id number] [facility]   Please page night float  after 7PM    Patient is a 55y old  Male who presents with a chief complaint of septic shock (27 Oct 2021 15:33)      SUBJECTIVE / OVERNIGHT EVENTS: No acute events overnight. This AM patient seen and examined at bedside. ROS limited as patient is nonverbal at baseline.    ADDITIONAL REVIEW OF SYSTEMS: negative     MEDICATIONS  (STANDING):  artificial  tears Solution 1 Drop(s) Both EYES every 12 hours  benztropine 0.5 milliGRAM(s) Oral every 12 hours  enoxaparin Injectable 40 milliGRAM(s) SubCutaneous daily  haloperidol    Concentrate 0.5 milliGRAM(s) Oral <User Schedule>  haloperidol    Concentrate 1 milliGRAM(s) Oral <User Schedule>  levETIRAcetam  Solution 500 milliGRAM(s) Oral two times a day  piperacillin/tazobactam IVPB.. 3.375 Gram(s) IV Intermittent every 8 hours  polyethylene glycol 3350 17 Gram(s) Oral <User Schedule>    MEDICATIONS  (PRN):  acetaminophen    Suspension .. 650 milliGRAM(s) Oral every 6 hours PRN Temp greater or equal to 38C (100.4F), Mild Pain (1 - 3)  ondansetron Injectable 4 milliGRAM(s) IV Push every 8 hours PRN Nausea and/or Vomiting  polyethylene glycol 3350 17 Gram(s) Oral daily PRN Constipation      CAPILLARY BLOOD GLUCOSE        I&O's Summary    27 Oct 2021 07:01  -  28 Oct 2021 07:00  --------------------------------------------------------  IN: 2100 mL / OUT: 300 mL / NET: 1800 mL        PHYSICAL EXAM:  Vital Signs Last 24 Hrs  T(C): 36.8 (28 Oct 2021 05:36), Max: 37.2 (27 Oct 2021 16:00)  T(F): 98.2 (28 Oct 2021 05:36), Max: 98.9 (27 Oct 2021 16:00)  HR: 94 (28 Oct 2021 05:36) (85 - 96)  BP: 90/51 (28 Oct 2021 05:36) (90/51 - 102/65)  BP(mean): --  RR: 17 (28 Oct 2021 05:36) (17 - 18)  SpO2: 95% (28 Oct 2021 05:36) (92% - 97%)    CONSTITUTIONAL: No acute distress, cachetic  EYES: No conjunctival or scleral injection, non-icteric   RESPIRATORY: no cough, lungs with slight bibasilar crackles   CARDIOVASCULAR: +S1S2, RRR, no M/G/R; pedal pulses full and symmetric; no lower extremity edema  GASTROINTESTINAL: No palpable masses or tenderness, +BS throughout, no rebound/guarding; no hepatosplenomegaly; gtube and colostomy in place  SKIN: No rashes or ulcers noted  NEUROLOGIC: alert, tracks movement, nonverbal    LABS:                        12.5   7.22  )-----------( 297      ( 27 Oct 2021 08:21 )             38.4     10-27    149<H>  |  110<H>  |  28<H>  ----------------------------<  99  3.6   |  26  |  0.54    Ca    9.1      27 Oct 2021 08:21  Phos  3.1     10-27  Mg     2.60     10-27    TPro  6.8  /  Alb  3.0<L>  /  TBili  0.3  /  DBili  x   /  AST  22  /  ALT  22  /  AlkPhos  62  10-27                RADIOLOGY & ADDITIONAL TESTS:  Results Reviewed:   Imaging Personally Reviewed:  Electrocardiogram Personally Reviewed:    COORDINATION OF CARE:  Care Discussed with Consultants/Other Providers [Y/N]:   Prior or Outpatient Records Reviewed [Y/N]:   PROGRESS NOTE:   Authored by Helena Thurston MD PGY-1  Pager [phone] [facility], [id number] [facility]   Please page night float  after 7PM    Patient is a 55y old  Male who presents with a chief complaint of septic shock (27 Oct 2021 15:33)    SUBJECTIVE / OVERNIGHT EVENTS: No acute events overnight. This AM patient seen and examined at bedside. ROS limited as patient is nonverbal at baseline.    ADDITIONAL REVIEW OF SYSTEMS: negative     MEDICATIONS  (STANDING):  artificial  tears Solution 1 Drop(s) Both EYES every 12 hours  benztropine 0.5 milliGRAM(s) Oral every 12 hours  enoxaparin Injectable 40 milliGRAM(s) SubCutaneous daily  haloperidol    Concentrate 0.5 milliGRAM(s) Oral <User Schedule>  haloperidol    Concentrate 1 milliGRAM(s) Oral <User Schedule>  levETIRAcetam  Solution 500 milliGRAM(s) Oral two times a day  piperacillin/tazobactam IVPB.. 3.375 Gram(s) IV Intermittent every 8 hours  polyethylene glycol 3350 17 Gram(s) Oral <User Schedule>    MEDICATIONS  (PRN):  acetaminophen    Suspension .. 650 milliGRAM(s) Oral every 6 hours PRN Temp greater or equal to 38C (100.4F), Mild Pain (1 - 3)  ondansetron Injectable 4 milliGRAM(s) IV Push every 8 hours PRN Nausea and/or Vomiting  polyethylene glycol 3350 17 Gram(s) Oral daily PRN Constipation    I&O's Summary    27 Oct 2021 07:01  -  28 Oct 2021 07:00  --------------------------------------------------------  IN: 2100 mL / OUT: 300 mL / NET: 1800 mL    PHYSICAL EXAM:  Vital Signs Last 24 Hrs  T(C): 36.8 (28 Oct 2021 05:36), Max: 37.2 (27 Oct 2021 16:00)  T(F): 98.2 (28 Oct 2021 05:36), Max: 98.9 (27 Oct 2021 16:00)  HR: 94 (28 Oct 2021 05:36) (85 - 96)  BP: 90/51 (28 Oct 2021 05:36) (90/51 - 102/65)  RR: 17 (28 Oct 2021 05:36) (17 - 18)  SpO2: 95% (28 Oct 2021 05:36) (92% - 97%)    CONSTITUTIONAL: No acute distress, cachetic  EYES: No conjunctival or scleral injection, non-icteric   RESPIRATORY: no cough, lungs with slight bibasilar crackles   CARDIOVASCULAR: +S1S2, RRR, no M/G/R; pedal pulses full and symmetric; no lower extremity edema  GASTROINTESTINAL: No palpable masses or tenderness, +BS throughout, no rebound/guarding; no hepatosplenomegaly; gtube and colostomy in place  SKIN: No rashes or ulcers noted  NEUROLOGIC: alert, tracks movement, nonverbal    LABS:                        12.5   7.22  )-----------( 297      ( 27 Oct 2021 08:21 )             38.4     10-27    149<H>  |  110<H>  |  28<H>  ----------------------------<  99  3.6   |  26  |  0.54    Ca    9.1      27 Oct 2021 08:21  Phos  3.1     10-27  Mg     2.60     10-27    TPro  6.8  /  Alb  3.0<L>  /  TBili  0.3  /  DBili  x   /  AST  22  /  ALT  22  /  AlkPhos  62  10-27    RADIOLOGY & ADDITIONAL TESTS:  Results Reviewed:   Imaging Personally Reviewed:  Electrocardiogram Personally Reviewed:    COORDINATION OF CARE:  Care Discussed with Consultants/Other Providers [Y/N]:   Prior or Outpatient Records Reviewed [Y/N]:

## 2021-10-28 NOTE — PROGRESS NOTE ADULT - PROBLEM SELECTOR PLAN 2
Home feeding schedule: Jevity 1.5 tube feeds (5 cans for daily feeding, at 6 AM, 10 AM, 2 PM, 6 PM, 10 PM/125 cc of water before and after feeds)  - G tube in place per GI, does not need to be replaced  - feeds held and started at half volume after aspiration event 10/24  - 10/26 back to full feeds: Jevity 1.2 300cc bolus, 4 times per day

## 2021-10-28 NOTE — PROGRESS NOTE ADULT - PROBLEM SELECTOR PLAN 1
Presented with cough, fever and new oxygen requirement after observed aspiration event from g-tube feeds and CTAP with alveolar opacities   - completed course of zosyn x7days (10/14-10/20)  - chest PT   - blood cultures and urine cultures 10/14 no growth    - possible re-aspiration event on 10/24, patient febrile, increased WBC count and tachycardic   - blood and urine cultures from 10/24 NGTD  - CXR 10/25 with new patchy right lower lung opacities  - continue with zosyn (10/24- 10/28), 5 day course

## 2021-10-29 LAB
ANION GAP SERPL CALC-SCNC: 11 MMOL/L — SIGNIFICANT CHANGE UP (ref 7–14)
BASOPHILS # BLD AUTO: 0.01 K/UL — SIGNIFICANT CHANGE UP (ref 0–0.2)
BASOPHILS NFR BLD AUTO: 0.1 % — SIGNIFICANT CHANGE UP (ref 0–2)
BUN SERPL-MCNC: 16 MG/DL — SIGNIFICANT CHANGE UP (ref 7–23)
CALCIUM SERPL-MCNC: 8.7 MG/DL — SIGNIFICANT CHANGE UP (ref 8.4–10.5)
CHLORIDE SERPL-SCNC: 101 MMOL/L — SIGNIFICANT CHANGE UP (ref 98–107)
CO2 SERPL-SCNC: 24 MMOL/L — SIGNIFICANT CHANGE UP (ref 22–31)
CREAT SERPL-MCNC: 0.48 MG/DL — LOW (ref 0.5–1.3)
CULTURE RESULTS: SIGNIFICANT CHANGE UP
CULTURE RESULTS: SIGNIFICANT CHANGE UP
EOSINOPHIL # BLD AUTO: 0.06 K/UL — SIGNIFICANT CHANGE UP (ref 0–0.5)
EOSINOPHIL NFR BLD AUTO: 0.9 % — SIGNIFICANT CHANGE UP (ref 0–6)
GLUCOSE SERPL-MCNC: 121 MG/DL — HIGH (ref 70–99)
HCT VFR BLD CALC: 37.9 % — LOW (ref 39–50)
HGB BLD-MCNC: 12.9 G/DL — LOW (ref 13–17)
IANC: 4.89 K/UL — SIGNIFICANT CHANGE UP (ref 1.5–8.5)
IMM GRANULOCYTES NFR BLD AUTO: 0.3 % — SIGNIFICANT CHANGE UP (ref 0–1.5)
LYMPHOCYTES # BLD AUTO: 1.4 K/UL — SIGNIFICANT CHANGE UP (ref 1–3.3)
LYMPHOCYTES # BLD AUTO: 20.9 % — SIGNIFICANT CHANGE UP (ref 13–44)
MAGNESIUM SERPL-MCNC: 2.2 MG/DL — SIGNIFICANT CHANGE UP (ref 1.6–2.6)
MCHC RBC-ENTMCNC: 31.9 PG — SIGNIFICANT CHANGE UP (ref 27–34)
MCHC RBC-ENTMCNC: 34 GM/DL — SIGNIFICANT CHANGE UP (ref 32–36)
MCV RBC AUTO: 93.8 FL — SIGNIFICANT CHANGE UP (ref 80–100)
MONOCYTES # BLD AUTO: 0.31 K/UL — SIGNIFICANT CHANGE UP (ref 0–0.9)
MONOCYTES NFR BLD AUTO: 4.6 % — SIGNIFICANT CHANGE UP (ref 2–14)
NEUTROPHILS # BLD AUTO: 4.89 K/UL — SIGNIFICANT CHANGE UP (ref 1.8–7.4)
NEUTROPHILS NFR BLD AUTO: 73.2 % — SIGNIFICANT CHANGE UP (ref 43–77)
NRBC # BLD: 0 /100 WBCS — SIGNIFICANT CHANGE UP
NRBC # FLD: 0 K/UL — SIGNIFICANT CHANGE UP
PHOSPHATE SERPL-MCNC: 2.7 MG/DL — SIGNIFICANT CHANGE UP (ref 2.5–4.5)
PLATELET # BLD AUTO: 332 K/UL — SIGNIFICANT CHANGE UP (ref 150–400)
POTASSIUM SERPL-MCNC: 3.9 MMOL/L — SIGNIFICANT CHANGE UP (ref 3.5–5.3)
POTASSIUM SERPL-SCNC: 3.9 MMOL/L — SIGNIFICANT CHANGE UP (ref 3.5–5.3)
RBC # BLD: 4.04 M/UL — LOW (ref 4.2–5.8)
RBC # FLD: 12.7 % — SIGNIFICANT CHANGE UP (ref 10.3–14.5)
SODIUM SERPL-SCNC: 136 MMOL/L — SIGNIFICANT CHANGE UP (ref 135–145)
SPECIMEN SOURCE: SIGNIFICANT CHANGE UP
SPECIMEN SOURCE: SIGNIFICANT CHANGE UP
WBC # BLD: 6.69 K/UL — SIGNIFICANT CHANGE UP (ref 3.8–10.5)
WBC # FLD AUTO: 6.69 K/UL — SIGNIFICANT CHANGE UP (ref 3.8–10.5)

## 2021-10-29 PROCEDURE — 99232 SBSQ HOSP IP/OBS MODERATE 35: CPT | Mod: GC

## 2021-10-29 RX ADMIN — Medication 0.5 MILLIGRAM(S): at 17:06

## 2021-10-29 RX ADMIN — Medication 1 DROP(S): at 05:37

## 2021-10-29 RX ADMIN — LEVETIRACETAM 500 MILLIGRAM(S): 250 TABLET, FILM COATED ORAL at 05:36

## 2021-10-29 RX ADMIN — LEVETIRACETAM 500 MILLIGRAM(S): 250 TABLET, FILM COATED ORAL at 17:06

## 2021-10-29 RX ADMIN — Medication 0.5 MILLIGRAM(S): at 05:36

## 2021-10-29 RX ADMIN — Medication 1 DROP(S): at 17:06

## 2021-10-29 RX ADMIN — HALOPERIDOL DECANOATE 1 MILLIGRAM(S): 100 INJECTION INTRAMUSCULAR at 21:11

## 2021-10-29 RX ADMIN — HALOPERIDOL DECANOATE 0.5 MILLIGRAM(S): 100 INJECTION INTRAMUSCULAR at 10:53

## 2021-10-29 RX ADMIN — ENOXAPARIN SODIUM 40 MILLIGRAM(S): 100 INJECTION SUBCUTANEOUS at 10:57

## 2021-10-29 NOTE — PROGRESS NOTE ADULT - SUBJECTIVE AND OBJECTIVE BOX
PROGRESS NOTE:   Authored by Helena Thurston MD PGY-1  Pager [phone] [facility], [id number] [facility]   Please page night float  after 7PM    Patient is a 55y old  Male who presents with a chief complaint of septic shock (28 Oct 2021 07:22)      SUBJECTIVE / OVERNIGHT EVENTS:    ADDITIONAL REVIEW OF SYSTEMS:    MEDICATIONS  (STANDING):  artificial  tears Solution 1 Drop(s) Both EYES every 12 hours  benztropine 0.5 milliGRAM(s) Oral every 12 hours  enoxaparin Injectable 40 milliGRAM(s) SubCutaneous daily  haloperidol    Concentrate 0.5 milliGRAM(s) Oral <User Schedule>  haloperidol    Concentrate 1 milliGRAM(s) Oral <User Schedule>  levETIRAcetam  Solution 500 milliGRAM(s) Oral two times a day  polyethylene glycol 3350 17 Gram(s) Oral <User Schedule>    MEDICATIONS  (PRN):  acetaminophen    Suspension .. 650 milliGRAM(s) Oral every 6 hours PRN Temp greater or equal to 38C (100.4F), Mild Pain (1 - 3)  ondansetron Injectable 4 milliGRAM(s) IV Push every 8 hours PRN Nausea and/or Vomiting  polyethylene glycol 3350 17 Gram(s) Oral daily PRN Constipation      CAPILLARY BLOOD GLUCOSE        I&O's Summary    28 Oct 2021 07:01  -  29 Oct 2021 07:00  --------------------------------------------------------  IN: 750 mL / OUT: 0 mL / NET: 750 mL        PHYSICAL EXAM:  Vital Signs Last 24 Hrs  T(C): 36.4 (29 Oct 2021 05:34), Max: 36.5 (28 Oct 2021 13:28)  T(F): 97.5 (29 Oct 2021 05:34), Max: 97.7 (28 Oct 2021 13:28)  HR: 81 (29 Oct 2021 05:34) (81 - 88)  BP: 95/60 (29 Oct 2021 05:34) (92/64 - 100/69)  BP(mean): --  RR: 18 (29 Oct 2021 05:34) (16 - 18)  SpO2: 99% (29 Oct 2021 05:34) (97% - 99%)    CONSTITUTIONAL: NAD, well-developed  RESPIRATORY: Normal respiratory effort; lungs are clear to auscultation bilaterally  CARDIOVASCULAR: Regular rate and rhythm, normal S1 and S2, no murmur/rub/gallop; No lower extremity edema; Peripheral pulses are 2+ bilaterally  ABDOMEN: Nontender to palpation, normoactive bowel sounds, no rebound/guarding  MUSCULOSKELETAL: no clubbing or cyanosis of digits; no joint swelling or tenderness to palpation  PSYCH: A+O to person, place, and time; affect appropriate    LABS:                        12.3   4.86  )-----------( 301      ( 28 Oct 2021 07:55 )             36.6     10-28    143  |  108<H>  |  26<H>  ----------------------------<  92  4.1   |  25  |  0.37<L>    Ca    8.7      28 Oct 2021 07:55  Phos  3.2     10-28  Mg     2.40     10-28    TPro  6.8  /  Alb  3.0<L>  /  TBili  0.3  /  DBili  x   /  AST  22  /  ALT  22  /  AlkPhos  62  10-27                RADIOLOGY & ADDITIONAL TESTS:  Results Reviewed:   Imaging Personally Reviewed:  Electrocardiogram Personally Reviewed:    COORDINATION OF CARE:  Care Discussed with Consultants/Other Providers [Y/N]:  Prior or Outpatient Records Reviewed [Y/N]:   PROGRESS NOTE:   Authored by Helena Thurston MD PGY-1  Pager [phone] [facility], [id number] [facility]   Please page night float  after 7PM    Patient is a 55y old  Male who presents with a chief complaint of septic shock (28 Oct 2021 07:22)      SUBJECTIVE / OVERNIGHT EVENTS: No acute events overnight. Patient seen and examined this AM. ROS limited as patient is nonverbal at baseline.     ADDITIONAL REVIEW OF SYSTEMS: negative     MEDICATIONS  (STANDING):  artificial  tears Solution 1 Drop(s) Both EYES every 12 hours  benztropine 0.5 milliGRAM(s) Oral every 12 hours  enoxaparin Injectable 40 milliGRAM(s) SubCutaneous daily  haloperidol    Concentrate 0.5 milliGRAM(s) Oral <User Schedule>  haloperidol    Concentrate 1 milliGRAM(s) Oral <User Schedule>  levETIRAcetam  Solution 500 milliGRAM(s) Oral two times a day  polyethylene glycol 3350 17 Gram(s) Oral <User Schedule>    MEDICATIONS  (PRN):  acetaminophen    Suspension .. 650 milliGRAM(s) Oral every 6 hours PRN Temp greater or equal to 38C (100.4F), Mild Pain (1 - 3)  ondansetron Injectable 4 milliGRAM(s) IV Push every 8 hours PRN Nausea and/or Vomiting  polyethylene glycol 3350 17 Gram(s) Oral daily PRN Constipation      CAPILLARY BLOOD GLUCOSE        I&O's Summary    28 Oct 2021 07:01  -  29 Oct 2021 07:00  --------------------------------------------------------  IN: 750 mL / OUT: 0 mL / NET: 750 mL        PHYSICAL EXAM:  Vital Signs Last 24 Hrs  T(C): 36.4 (29 Oct 2021 05:34), Max: 36.5 (28 Oct 2021 13:28)  T(F): 97.5 (29 Oct 2021 05:34), Max: 97.7 (28 Oct 2021 13:28)  HR: 81 (29 Oct 2021 05:34) (81 - 88)  BP: 95/60 (29 Oct 2021 05:34) (92/64 - 100/69)  BP(mean): --  RR: 18 (29 Oct 2021 05:34) (16 - 18)  SpO2: 99% (29 Oct 2021 05:34) (97% - 99%)    CONSTITUTIONAL: No acute distress, cachetic  EYES: No conjunctival or scleral injection, non-icteric   RESPIRATORY: no cough, lungs clear to ascultation bilaterally   CARDIOVASCULAR: +S1S2, RRR, no M/G/R; pedal pulses full and symmetric; no lower extremity edema  GASTROINTESTINAL: No palpable masses or tenderness, +BS throughout, no rebound/guarding; no hepatosplenomegaly; gtube and colostomy in place  SKIN: No rashes or ulcers noted  NEUROLOGIC: alert, tracks movement, nonverbal    LABS:                        12.3   4.86  )-----------( 301      ( 28 Oct 2021 07:55 )             36.6     10-28    143  |  108<H>  |  26<H>  ----------------------------<  92  4.1   |  25  |  0.37<L>    Ca    8.7      28 Oct 2021 07:55  Phos  3.2     10-28  Mg     2.40     10-28    TPro  6.8  /  Alb  3.0<L>  /  TBili  0.3  /  DBili  x   /  AST  22  /  ALT  22  /  AlkPhos  62  10-27                RADIOLOGY & ADDITIONAL TESTS:  Results Reviewed:   Imaging Personally Reviewed:  Electrocardiogram Personally Reviewed:    COORDINATION OF CARE:  Care Discussed with Consultants/Other Providers [Y/N]:  Prior or Outpatient Records Reviewed [Y/N]:

## 2021-10-29 NOTE — CHART NOTE - NSCHARTNOTEFT_GEN_A_CORE
Diet, NPO with Tube Feed:   Tube Feeding Modality: Gastrostomy  Jevity 1.2 Tacho (JEVITY1.2RTH)  Total Volume for 24 Hours (mL): 1200  Bolus  Total Volume of Bolus (mL):  300  Total # of Feeds: 4  Tube Feed Frequency: Every 6 hours   Tube Feed Start Time: 11:00  Bolus Feed Rate (mL per Hour): 300   Bolus Feed Duration (in Hours): 1 (10-26-21 @ 11:49)      Weight Trend: 10/27 54.1 kg    Contacted by Team to verify caloric appropriates of enteral feeding. Current TF is providing 1440 kcal, 67 gms protein, 968 ml free H2O in total volume 1200 ml/day and gives pt 26 kcals/kg and 1.2 gms protein/kg of current weight. Pt to return home using Jevity 1.5 at home - if same bolus amounts are given, pt will be receiving 1800 kcals, 77 gms protein, 912 ml free H2O in total volume 1200 ml/day. This will offer 33 kcals/kg and 1.4 gms protein/kg of current weight. Weight has been stable since admission, but should trend up nicely upon d/c home. RDN services to remain available as needed.

## 2021-10-29 NOTE — PROGRESS NOTE ADULT - ASSESSMENT
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 Patient now with a second episode of aspiration pneumonia, treating accordingly.  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 Patient had a second episode of aspiration pneumonia and was treated accordingly.

## 2021-10-29 NOTE — PROGRESS NOTE ADULT - ATTENDING COMMENTS
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) admitted with septic shock 2/2 aspiration PNA s/p improvement, now with recurrent fever on 10/24 back on IV abx, presumed aspiration event (other infectious w/u neg to date)     # Aspiration: s/p treatment for aspiration PNA with zosyn, now with recurrent fever and O2 requirement suspect recurrent event, no other ways to mitigate event as on TF with HOB elevation  - completed abx   - WBC normalized, O2 requirements minimal and intermittent   - poor airway clearance at baseline, remains an aspiration risk    # Hypernatremia:   - resolved, c/w TF and free water     # Seizure disorder:  - continue Keppra    Needs cannot be met a prior group home, planned for LTC however needs level 2  Appreciate pallcare eval, family wants full code at this point  Medically stable for dc to LTC pending placement

## 2021-10-29 NOTE — PROGRESS NOTE ADULT - PROBLEM SELECTOR PLAN 1
Presented with cough, fever and new oxygen requirement after observed aspiration event from g-tube feeds and CTAP with alveolar opacities   - completed course of zosyn x7days (10/14-10/20)  - chest PT   - blood cultures and urine cultures 10/14 no growth    - possible re-aspiration event on 10/24, patient febrile, increased WBC count and tachycardic   - blood and urine cultures from 10/24 NGTD  - CXR 10/25 with new patchy right lower lung opacities  - continue with zosyn (10/24- 10/28), 5 day course Presented with cough, fever and new oxygen requirement after observed aspiration event from g-tube feeds and CTAP with alveolar opacities   - completed course of zosyn x7days (10/14-10/20)  - chest PT   - blood cultures and urine cultures 10/14 no growth    - possible re-aspiration event on 10/24, patient febrile, increased WBC count and tachycardic   - blood and urine cultures from 10/24 no growth  - CXR 10/25 with new patchy right lower lung opacities  - completed zosyn (10/24- 10/28), 5 day course

## 2021-10-30 PROCEDURE — 99232 SBSQ HOSP IP/OBS MODERATE 35: CPT | Mod: GC

## 2021-10-30 RX ADMIN — Medication 1 DROP(S): at 05:05

## 2021-10-30 RX ADMIN — Medication 0.5 MILLIGRAM(S): at 05:05

## 2021-10-30 RX ADMIN — HALOPERIDOL DECANOATE 0.5 MILLIGRAM(S): 100 INJECTION INTRAMUSCULAR at 10:52

## 2021-10-30 RX ADMIN — LEVETIRACETAM 500 MILLIGRAM(S): 250 TABLET, FILM COATED ORAL at 17:27

## 2021-10-30 RX ADMIN — ENOXAPARIN SODIUM 40 MILLIGRAM(S): 100 INJECTION SUBCUTANEOUS at 13:23

## 2021-10-30 RX ADMIN — Medication 0.5 MILLIGRAM(S): at 17:27

## 2021-10-30 RX ADMIN — LEVETIRACETAM 500 MILLIGRAM(S): 250 TABLET, FILM COATED ORAL at 05:05

## 2021-10-30 RX ADMIN — Medication 1 DROP(S): at 17:27

## 2021-10-30 RX ADMIN — HALOPERIDOL DECANOATE 1 MILLIGRAM(S): 100 INJECTION INTRAMUSCULAR at 20:14

## 2021-10-30 NOTE — PROGRESS NOTE ADULT - SUBJECTIVE AND OBJECTIVE BOX
PROGRESS NOTE:   Authored by Helena Thurston MD PGY-1  Pager [phone] [facility], [id number] [facility]   Please page night float  after 7PM    Patient is a 55y old  Male who presents with a chief complaint of septic shock (29 Oct 2021 07:26)      SUBJECTIVE / OVERNIGHT EVENTS:    ADDITIONAL REVIEW OF SYSTEMS:    MEDICATIONS  (STANDING):  artificial  tears Solution 1 Drop(s) Both EYES every 12 hours  benztropine 0.5 milliGRAM(s) Oral every 12 hours  enoxaparin Injectable 40 milliGRAM(s) SubCutaneous daily  haloperidol    Concentrate 1 milliGRAM(s) Oral <User Schedule>  haloperidol    Concentrate 0.5 milliGRAM(s) Oral <User Schedule>  levETIRAcetam  Solution 500 milliGRAM(s) Oral two times a day  polyethylene glycol 3350 17 Gram(s) Oral <User Schedule>    MEDICATIONS  (PRN):  acetaminophen    Suspension .. 650 milliGRAM(s) Oral every 6 hours PRN Temp greater or equal to 38C (100.4F), Mild Pain (1 - 3)  ondansetron Injectable 4 milliGRAM(s) IV Push every 8 hours PRN Nausea and/or Vomiting  polyethylene glycol 3350 17 Gram(s) Oral daily PRN Constipation      CAPILLARY BLOOD GLUCOSE        I&O's Summary    29 Oct 2021 07:01  -  30 Oct 2021 07:00  --------------------------------------------------------  IN: 750 mL / OUT: 0 mL / NET: 750 mL        PHYSICAL EXAM:  Vital Signs Last 24 Hrs  T(C): 36.4 (30 Oct 2021 05:02), Max: 36.9 (29 Oct 2021 09:30)  T(F): 97.6 (30 Oct 2021 05:02), Max: 98.4 (29 Oct 2021 09:30)  HR: 84 (30 Oct 2021 05:02) (80 - 94)  BP: 92/55 (30 Oct 2021 05:02) (90/54 - 101/58)  BP(mean): --  RR: 18 (30 Oct 2021 05:02) (17 - 18)  SpO2: 97% (30 Oct 2021 05:02) (96% - 100%)    CONSTITUTIONAL: NAD, well-developed  RESPIRATORY: Normal respiratory effort; lungs are clear to auscultation bilaterally  CARDIOVASCULAR: Regular rate and rhythm, normal S1 and S2, no murmur/rub/gallop; No lower extremity edema; Peripheral pulses are 2+ bilaterally  ABDOMEN: Nontender to palpation, normoactive bowel sounds, no rebound/guarding  MUSCULOSKELETAL: no clubbing or cyanosis of digits; no joint swelling or tenderness to palpation  PSYCH: A+O to person, place, and time; affect appropriate    LABS:                        12.9   6.69  )-----------( 332      ( 29 Oct 2021 08:01 )             37.9     10-29    136  |  101  |  16  ----------------------------<  121<H>  3.9   |  24  |  0.48<L>    Ca    8.7      29 Oct 2021 08:01  Phos  2.7     10-29  Mg     2.20     10-29                  RADIOLOGY & ADDITIONAL TESTS:  Results Reviewed:   Imaging Personally Reviewed:  Electrocardiogram Personally Reviewed:    COORDINATION OF CARE:  Care Discussed with Consultants/Other Providers [Y/N]:  Prior or Outpatient Records Reviewed [Y/N]:   PROGRESS NOTE:   Authored by Helena Thurston MD PGY-1  Pager [phone] [facility], [phone] [facility]   Please page night float  after 7PM    Patient is a 55y old  Male who presents with a chief complaint of septic shock (29 Oct 2021 07:26)      SUBJECTIVE / OVERNIGHT EVENTS: No acute events overnight. Patient seen and examined at bedside this AM. ROS limited as patient is non-verbal. Patient on 1L oxygen this AM. Started coughing when woken up.     ADDITIONAL REVIEW OF SYSTEMS: negative     MEDICATIONS  (STANDING):  artificial  tears Solution 1 Drop(s) Both EYES every 12 hours  benztropine 0.5 milliGRAM(s) Oral every 12 hours  enoxaparin Injectable 40 milliGRAM(s) SubCutaneous daily  haloperidol    Concentrate 1 milliGRAM(s) Oral <User Schedule>  haloperidol    Concentrate 0.5 milliGRAM(s) Oral <User Schedule>  levETIRAcetam  Solution 500 milliGRAM(s) Oral two times a day  polyethylene glycol 3350 17 Gram(s) Oral <User Schedule>    MEDICATIONS  (PRN):  acetaminophen    Suspension .. 650 milliGRAM(s) Oral every 6 hours PRN Temp greater or equal to 38C (100.4F), Mild Pain (1 - 3)  ondansetron Injectable 4 milliGRAM(s) IV Push every 8 hours PRN Nausea and/or Vomiting  polyethylene glycol 3350 17 Gram(s) Oral daily PRN Constipation      CAPILLARY BLOOD GLUCOSE        I&O's Summary    29 Oct 2021 07:01  -  30 Oct 2021 07:00  --------------------------------------------------------  IN: 750 mL / OUT: 0 mL / NET: 750 mL        PHYSICAL EXAM:  Vital Signs Last 24 Hrs  T(C): 36.4 (30 Oct 2021 05:02), Max: 36.9 (29 Oct 2021 09:30)  T(F): 97.6 (30 Oct 2021 05:02), Max: 98.4 (29 Oct 2021 09:30)  HR: 84 (30 Oct 2021 05:02) (80 - 94)  BP: 92/55 (30 Oct 2021 05:02) (90/54 - 101/58)  BP(mean): --  RR: 18 (30 Oct 2021 05:02) (17 - 18)  SpO2: 97% (30 Oct 2021 05:02) (96% - 100%)    CONSTITUTIONAL: No acute distress, cachetic  EYES: No conjunctival or scleral injection, non-icteric   RESPIRATORY: no cough, lungs clear to ascultation bilaterally   CARDIOVASCULAR: +S1S2, RRR, no M/G/R; pedal pulses full and symmetric; no lower extremity edema  GASTROINTESTINAL: No palpable masses or tenderness, +BS throughout, no rebound/guarding; no hepatosplenomegaly; gtube and colostomy in place  SKIN: No rashes or ulcers noted  NEUROLOGIC: alert, tracks movement, nonverbal    LABS:                        12.9   6.69  )-----------( 332      ( 29 Oct 2021 08:01 )             37.9     10-29    136  |  101  |  16  ----------------------------<  121<H>  3.9   |  24  |  0.48<L>    Ca    8.7      29 Oct 2021 08:01  Phos  2.7     10-29  Mg     2.20     10-29      RADIOLOGY & ADDITIONAL TESTS:  Results Reviewed:   Imaging Personally Reviewed:  Electrocardiogram Personally Reviewed:    COORDINATION OF CARE:  Care Discussed with Consultants/Other Providers [Y/N]:  Prior or Outpatient Records Reviewed [Y/N]:

## 2021-10-30 NOTE — PROGRESS NOTE ADULT - PROBLEM SELECTOR PLAN 1
Presented with cough, fever and new oxygen requirement after observed aspiration event from g-tube feeds and CTAP with alveolar opacities   - completed course of zosyn x7days (10/14-10/20)  - chest PT   - blood cultures and urine cultures 10/14 no growth    - possible re-aspiration event on 10/24, patient febrile, increased WBC count and tachycardic   - blood and urine cultures from 10/24 no growth  - CXR 10/25 with new patchy right lower lung opacities  - completed zosyn (10/24- 10/28), 5 day course

## 2021-10-30 NOTE — PROGRESS NOTE ADULT - PROBLEM SELECTOR PLAN 2
Home feeding schedule: Jevity 1.5 tube feeds (5 cans for daily feeding, at 6 AM, 10 AM, 2 PM, 6 PM, 10 PM/125 cc of water before and after feeds)  - G tube in place per GI, does not need to be replaced  - feeds held and started at half volume after aspiration event 10/24  - 10/26 back to full feeds: Jevity 1.2 300cc bolus, 4 times per day Home feeding schedule: Jevity 1.5 tube feeds (5 cans for daily feeding, at 6 AM, 10 AM, 2 PM, 6 PM, 10 PM/125 cc of water before and after feeds)  - G tube in place per GI, does not need to be replaced  - feeds held and started at half volume after aspiration event 10/24  - 10/26 back to full feeds: Jevity 1.2 300cc bolus, 4 times per day  - per nutrition 10/29 patient is meeting caloric requirements and weight should increase appropriately

## 2021-10-30 NOTE — PROGRESS NOTE ADULT - ASSESSMENT
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 Patient had a second episode of aspiration pneumonia and was treated accordingly.

## 2021-10-30 NOTE — PROGRESS NOTE ADULT - ATTENDING COMMENTS
Patient seen and examined. Case discussed with the medical team on rounds. I agree with the findings and the plan above.    Continue tube feeds, and continue dispo planning  Continue the rest of the work up and management as stated above.

## 2021-10-31 PROCEDURE — 99232 SBSQ HOSP IP/OBS MODERATE 35: CPT | Mod: GC

## 2021-10-31 RX ADMIN — Medication 0.5 MILLIGRAM(S): at 05:14

## 2021-10-31 RX ADMIN — Medication 0.5 MILLIGRAM(S): at 17:13

## 2021-10-31 RX ADMIN — LEVETIRACETAM 500 MILLIGRAM(S): 250 TABLET, FILM COATED ORAL at 17:13

## 2021-10-31 RX ADMIN — HALOPERIDOL DECANOATE 0.5 MILLIGRAM(S): 100 INJECTION INTRAMUSCULAR at 08:04

## 2021-10-31 RX ADMIN — HALOPERIDOL DECANOATE 1 MILLIGRAM(S): 100 INJECTION INTRAMUSCULAR at 21:00

## 2021-10-31 RX ADMIN — LEVETIRACETAM 500 MILLIGRAM(S): 250 TABLET, FILM COATED ORAL at 05:14

## 2021-10-31 RX ADMIN — Medication 1 DROP(S): at 05:14

## 2021-10-31 RX ADMIN — ENOXAPARIN SODIUM 40 MILLIGRAM(S): 100 INJECTION SUBCUTANEOUS at 11:38

## 2021-10-31 RX ADMIN — Medication 1 DROP(S): at 17:13

## 2021-10-31 NOTE — PROGRESS NOTE ADULT - SUBJECTIVE AND OBJECTIVE BOX
PROGRESS NOTE:   Authored by Helena Thurston MD PGY-1  Pager [phone] [facility], [phone] [facility]   Please page night float  after 7PM    Patient is a 55y old  Male who presents with a chief complaint of septic shock (30 Oct 2021 07:03)      SUBJECTIVE / OVERNIGHT EVENTS:    ADDITIONAL REVIEW OF SYSTEMS:    MEDICATIONS  (STANDING):  artificial  tears Solution 1 Drop(s) Both EYES every 12 hours  benztropine 0.5 milliGRAM(s) Oral every 12 hours  enoxaparin Injectable 40 milliGRAM(s) SubCutaneous daily  haloperidol    Concentrate 1 milliGRAM(s) Oral <User Schedule>  haloperidol    Concentrate 0.5 milliGRAM(s) Oral <User Schedule>  levETIRAcetam  Solution 500 milliGRAM(s) Oral two times a day  polyethylene glycol 3350 17 Gram(s) Oral <User Schedule>    MEDICATIONS  (PRN):  acetaminophen    Suspension .. 650 milliGRAM(s) Oral every 6 hours PRN Temp greater or equal to 38C (100.4F), Mild Pain (1 - 3)  ondansetron Injectable 4 milliGRAM(s) IV Push every 8 hours PRN Nausea and/or Vomiting  polyethylene glycol 3350 17 Gram(s) Oral daily PRN Constipation      CAPILLARY BLOOD GLUCOSE        I&O's Summary    30 Oct 2021 07:01  -  31 Oct 2021 07:00  --------------------------------------------------------  IN: 2700 mL / OUT: 0 mL / NET: 2700 mL        PHYSICAL EXAM:  Vital Signs Last 24 Hrs  T(C): 36.4 (31 Oct 2021 05:12), Max: 36.6 (30 Oct 2021 14:58)  T(F): 97.5 (31 Oct 2021 05:12), Max: 97.8 (30 Oct 2021 14:58)  HR: 91 (31 Oct 2021 05:12) (80 - 98)  BP: 88/60 (31 Oct 2021 05:12) (84/56 - 102/68)  BP(mean): --  RR: 18 (31 Oct 2021 05:12) (17 - 18)  SpO2: 97% (31 Oct 2021 05:12) (94% - 98%)    CONSTITUTIONAL: NAD, well-developed  RESPIRATORY: Normal respiratory effort; lungs are clear to auscultation bilaterally  CARDIOVASCULAR: Regular rate and rhythm, normal S1 and S2, no murmur/rub/gallop; No lower extremity edema; Peripheral pulses are 2+ bilaterally  ABDOMEN: Nontender to palpation, normoactive bowel sounds, no rebound/guarding  MUSCULOSKELETAL: no clubbing or cyanosis of digits; no joint swelling or tenderness to palpation  PSYCH: A+O to person, place, and time; affect appropriate    LABS:                        12.9   6.69  )-----------( 332      ( 29 Oct 2021 08:01 )             37.9     10-29    136  |  101  |  16  ----------------------------<  121<H>  3.9   |  24  |  0.48<L>    Ca    8.7      29 Oct 2021 08:01  Phos  2.7     10-29  Mg     2.20     10-29                  RADIOLOGY & ADDITIONAL TESTS:  Results Reviewed:   Imaging Personally Reviewed:  Electrocardiogram Personally Reviewed:    COORDINATION OF CARE:  Care Discussed with Consultants/Other Providers [Y/N]:  Prior or Outpatient Records Reviewed [Y/N]:   PROGRESS NOTE:   Authored by Helena Thurston MD PGY-1  Pager [phone] [facility], [id number] [facility]   Please page night float  after 7PM    Patient is a 55y old  Male who presents with a chief complaint of septic shock (30 Oct 2021 07:03)      SUBJECTIVE / OVERNIGHT EVENTS: No acute events overnight. Patient seen and examined at bedside this AM. ROS limited as patient is non-verbal.     ADDITIONAL REVIEW OF SYSTEMS: negative     MEDICATIONS  (STANDING):  artificial  tears Solution 1 Drop(s) Both EYES every 12 hours  benztropine 0.5 milliGRAM(s) Oral every 12 hours  enoxaparin Injectable 40 milliGRAM(s) SubCutaneous daily  haloperidol    Concentrate 1 milliGRAM(s) Oral <User Schedule>  haloperidol    Concentrate 0.5 milliGRAM(s) Oral <User Schedule>  levETIRAcetam  Solution 500 milliGRAM(s) Oral two times a day  polyethylene glycol 3350 17 Gram(s) Oral <User Schedule>    MEDICATIONS  (PRN):  acetaminophen    Suspension .. 650 milliGRAM(s) Oral every 6 hours PRN Temp greater or equal to 38C (100.4F), Mild Pain (1 - 3)  ondansetron Injectable 4 milliGRAM(s) IV Push every 8 hours PRN Nausea and/or Vomiting  polyethylene glycol 3350 17 Gram(s) Oral daily PRN Constipation      CAPILLARY BLOOD GLUCOSE        I&O's Summary    30 Oct 2021 07:01  -  31 Oct 2021 07:00  --------------------------------------------------------  IN: 2700 mL / OUT: 0 mL / NET: 2700 mL        PHYSICAL EXAM:  Vital Signs Last 24 Hrs  T(C): 36.4 (31 Oct 2021 05:12), Max: 36.6 (30 Oct 2021 14:58)  T(F): 97.5 (31 Oct 2021 05:12), Max: 97.8 (30 Oct 2021 14:58)  HR: 91 (31 Oct 2021 05:12) (80 - 98)  BP: 88/60 (31 Oct 2021 05:12) (84/56 - 102/68)  BP(mean): --  RR: 18 (31 Oct 2021 05:12) (17 - 18)  SpO2: 97% (31 Oct 2021 05:12) (94% - 98%)    CONSTITUTIONAL: No acute distress, cachetic  EYES: No conjunctival or scleral injection, non-icteric   RESPIRATORY: no cough, lungs clear to ascultation bilaterally with transmitted upper airway sounds   CARDIOVASCULAR: +S1S2, RRR, no M/G/R; pedal pulses full and symmetric; no lower extremity edema  GASTROINTESTINAL: No palpable masses or tenderness, +BS throughout, no rebound/guarding; no hepatosplenomegaly; gtube and colostomy in place  SKIN: No rashes or ulcers noted  NEUROLOGIC: alert, tracks movement, nonverbal    LABS:                        12.9   6.69  )-----------( 332      ( 29 Oct 2021 08:01 )             37.9     10-29    136  |  101  |  16  ----------------------------<  121<H>  3.9   |  24  |  0.48<L>    Ca    8.7      29 Oct 2021 08:01  Phos  2.7     10-29  Mg     2.20     10-29                  RADIOLOGY & ADDITIONAL TESTS:  Results Reviewed:   Imaging Personally Reviewed:  Electrocardiogram Personally Reviewed:    COORDINATION OF CARE:  Care Discussed with Consultants/Other Providers [Y/N]:   Prior or Outpatient Records Reviewed [Y/N]:

## 2021-10-31 NOTE — PROGRESS NOTE ADULT - PROBLEM SELECTOR PLAN 2
Home feeding schedule: Jevity 1.5 tube feeds (5 cans for daily feeding, at 6 AM, 10 AM, 2 PM, 6 PM, 10 PM/125 cc of water before and after feeds)  - G tube in place per GI, does not need to be replaced  - feeds held and started at half volume after aspiration event 10/24  - 10/26 back to full feeds: Jevity 1.2 300cc bolus, 4 times per day  - per nutrition 10/29 patient is meeting caloric requirements and weight should increase appropriately

## 2021-11-01 LAB — SARS-COV-2 RNA SPEC QL NAA+PROBE: SIGNIFICANT CHANGE UP

## 2021-11-01 PROCEDURE — 99232 SBSQ HOSP IP/OBS MODERATE 35: CPT | Mod: GC

## 2021-11-01 RX ORDER — NYSTATIN CREAM 100000 [USP'U]/G
1 CREAM TOPICAL EVERY 12 HOURS
Refills: 0 | Status: DISCONTINUED | OUTPATIENT
Start: 2021-11-01 | End: 2021-11-09

## 2021-11-01 RX ADMIN — LEVETIRACETAM 500 MILLIGRAM(S): 250 TABLET, FILM COATED ORAL at 05:22

## 2021-11-01 RX ADMIN — LEVETIRACETAM 500 MILLIGRAM(S): 250 TABLET, FILM COATED ORAL at 18:31

## 2021-11-01 RX ADMIN — Medication 0.5 MILLIGRAM(S): at 05:22

## 2021-11-01 RX ADMIN — Medication 0.5 MILLIGRAM(S): at 18:31

## 2021-11-01 RX ADMIN — ENOXAPARIN SODIUM 40 MILLIGRAM(S): 100 INJECTION SUBCUTANEOUS at 10:36

## 2021-11-01 RX ADMIN — HALOPERIDOL DECANOATE 1 MILLIGRAM(S): 100 INJECTION INTRAMUSCULAR at 20:42

## 2021-11-01 RX ADMIN — Medication 1 DROP(S): at 05:22

## 2021-11-01 RX ADMIN — Medication 1 DROP(S): at 18:30

## 2021-11-01 RX ADMIN — NYSTATIN CREAM 1 APPLICATION(S): 100000 CREAM TOPICAL at 18:29

## 2021-11-01 RX ADMIN — HALOPERIDOL DECANOATE 0.5 MILLIGRAM(S): 100 INJECTION INTRAMUSCULAR at 10:28

## 2021-11-01 RX ADMIN — POLYETHYLENE GLYCOL 3350 17 GRAM(S): 17 POWDER, FOR SOLUTION ORAL at 18:30

## 2021-11-01 NOTE — PROGRESS NOTE ADULT - SUBJECTIVE AND OBJECTIVE BOX
*****************************************  Radha Almonte MD I PGY1  Internal Medicine  Pager NS: [phone]  *****************************************      Patient is a 55y old  Male who presents with a chief complaint of septic shock (31 Oct 2021 07:39)      SUBJECTIVE :      MEDICATIONS  (STANDING):  artificial  tears Solution 1 Drop(s) Both EYES every 12 hours  benztropine 0.5 milliGRAM(s) Oral every 12 hours  enoxaparin Injectable 40 milliGRAM(s) SubCutaneous daily  haloperidol    Concentrate 0.5 milliGRAM(s) Oral <User Schedule>  haloperidol    Concentrate 1 milliGRAM(s) Oral <User Schedule>  levETIRAcetam  Solution 500 milliGRAM(s) Oral two times a day  polyethylene glycol 3350 17 Gram(s) Oral <User Schedule>    MEDICATIONS  (PRN):  acetaminophen    Suspension .. 650 milliGRAM(s) Oral every 6 hours PRN Temp greater or equal to 38C (100.4F), Mild Pain (1 - 3)  ondansetron Injectable 4 milliGRAM(s) IV Push every 8 hours PRN Nausea and/or Vomiting  polyethylene glycol 3350 17 Gram(s) Oral daily PRN Constipation      CAPILLARY BLOOD GLUCOSE        I&O's Summary    30 Oct 2021 07:01  -  31 Oct 2021 07:00  --------------------------------------------------------  IN: 2700 mL / OUT: 0 mL / NET: 2700 mL    31 Oct 2021 07:01  -  01 Nov 2021 06:44  --------------------------------------------------------  IN: 1350 mL / OUT: 0 mL / NET: 1350 mL        PHYSICAL EXAM:  Vital Signs Last 24 Hrs  T(C): 36.6 (01 Nov 2021 05:19), Max: 36.6 (31 Oct 2021 09:00)  T(F): 97.8 (01 Nov 2021 05:19), Max: 97.8 (31 Oct 2021 09:00)  HR: 100 (01 Nov 2021 05:19) (94 - 101)  BP: 92/62 (01 Nov 2021 05:19) (90/59 - 92/62)  BP(mean): --  RR: 18 (01 Nov 2021 05:19) (18 - 20)  SpO2: 96% (01 Nov 2021 05:19) (96% - 100%)    CONSTITUTIONAL: No acute distress, cachetic  EYES: No conjunctival or scleral injection, non-icteric   RESPIRATORY: no cough, lungs clear to ascultation bilaterally with transmitted upper airway sounds   CARDIOVASCULAR: +S1S2, RRR, no M/G/R; pedal pulses full and symmetric; no lower extremity edema  GASTROINTESTINAL: No palpable masses or tenderness, +BS throughout, no rebound/guarding; no hepatosplenomegaly; gtube and colostomy in place  SKIN: No rashes or ulcers noted  NEUROLOGIC: alert, tracks movement, nonverbal      LABS:                      RADIOLOGY & ADDITIONAL TESTS:  Results Reviewed:   Imaging Personally Reviewed:  Electrocardiogram Personally Reviewed:    COORDINATION OF CARE:  Care Discussed with Consultants/Other Providers [Y/N]:  Prior or Outpatient Records Reviewed [Y/N]:   *****************************************  Radha Almonte MD I PGY1  Internal Medicine  Pager [phone]: [phone]  *****************************************      Patient is a 55y old  Male who presents with a chief complaint of septic shock (31 Oct 2021 07:39)      SUBJECTIVE : NAEO. Pt seen and examined at bedside. Unable to obtain ROS     MEDICATIONS  (STANDING):  artificial  tears Solution 1 Drop(s) Both EYES every 12 hours  benztropine 0.5 milliGRAM(s) Oral every 12 hours  enoxaparin Injectable 40 milliGRAM(s) SubCutaneous daily  haloperidol    Concentrate 0.5 milliGRAM(s) Oral <User Schedule>  haloperidol    Concentrate 1 milliGRAM(s) Oral <User Schedule>  levETIRAcetam  Solution 500 milliGRAM(s) Oral two times a day  polyethylene glycol 3350 17 Gram(s) Oral <User Schedule>    MEDICATIONS  (PRN):  acetaminophen    Suspension .. 650 milliGRAM(s) Oral every 6 hours PRN Temp greater or equal to 38C (100.4F), Mild Pain (1 - 3)  ondansetron Injectable 4 milliGRAM(s) IV Push every 8 hours PRN Nausea and/or Vomiting  polyethylene glycol 3350 17 Gram(s) Oral daily PRN Constipation      CAPILLARY BLOOD GLUCOSE        I&O's Summary    30 Oct 2021 07:01  -  31 Oct 2021 07:00  --------------------------------------------------------  IN: 2700 mL / OUT: 0 mL / NET: 2700 mL    31 Oct 2021 07:01  -  01 Nov 2021 06:44  --------------------------------------------------------  IN: 1350 mL / OUT: 0 mL / NET: 1350 mL        PHYSICAL EXAM:  Vital Signs Last 24 Hrs  T(C): 36.6 (01 Nov 2021 05:19), Max: 36.6 (31 Oct 2021 09:00)  T(F): 97.8 (01 Nov 2021 05:19), Max: 97.8 (31 Oct 2021 09:00)  HR: 100 (01 Nov 2021 05:19) (94 - 101)  BP: 92/62 (01 Nov 2021 05:19) (90/59 - 92/62)  BP(mean): --  RR: 18 (01 Nov 2021 05:19) (18 - 20)  SpO2: 96% (01 Nov 2021 05:19) (96% - 100%)    CONSTITUTIONAL: No acute distress, cachetic  EYES: No conjunctival or scleral injection, non-icteric   RESPIRATORY: no cough, lungs clear to ascultation bilaterally with transmitted upper airway sounds   CARDIOVASCULAR: +S1S2, RRR, no M/G/R; pedal pulses full and symmetric; no lower extremity edema  GASTROINTESTINAL: No palpable masses or tenderness, +BS throughout, no rebound/guarding; no hepatosplenomegaly; gtube and colostomy in place  SKIN: No rashes or ulcers noted  NEUROLOGIC: alert, tracks movement, nonverbal      LABS:                      RADIOLOGY & ADDITIONAL TESTS:  Results Reviewed: Y  Imaging Personally Reviewed:  Electrocardiogram Personally Reviewed:    COORDINATION OF CARE:  Care Discussed with Consultants/Other Providers [Y/N]:  Prior or Outpatient Records Reviewed [Y/N]:

## 2021-11-01 NOTE — PROGRESS NOTE ADULT - ASSESSMENT
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 Patient had a second episode of aspiration pneumonia and was treated accordingly.  Danie Olivera is a 55M with history of autism with intellectual developmenal developmental delay (nonverbal at baseline), remote history of seizures (on keppra), sigmoid volvulus (1/2021 s/p ex-lap and ostomy), g-tube placement in 6/2021 who was brought in from his group home with concern for aspiration admitted for septic shock secondary to aspiration pneumonia s/p total 12 days of IV zosyn

## 2021-11-01 NOTE — PROGRESS NOTE ADULT - PROBLEM SELECTOR PLAN 3
Patient hypotensive with MAP <65 on admission  - midodrine 10mg q8 weaned to 7.5mg q8 on 10/17 weaned to 5mg q8 for SBP <100 on 10/18  - d/c'd midodrine 10/19, MAPs have been >65 Patient hypotensive with MAP <65 on admission  - midodrine 10mg q8 weaned to 7.5mg q8 on 10/17 weaned to 5mg q8 for SBP <100 on 10/18  - d/c'd midodrine 10/19  - MAPs have been >65

## 2021-11-02 PROCEDURE — 99232 SBSQ HOSP IP/OBS MODERATE 35: CPT | Mod: GC

## 2021-11-02 RX ADMIN — Medication 0.5 MILLIGRAM(S): at 17:09

## 2021-11-02 RX ADMIN — LEVETIRACETAM 500 MILLIGRAM(S): 250 TABLET, FILM COATED ORAL at 17:09

## 2021-11-02 RX ADMIN — LEVETIRACETAM 500 MILLIGRAM(S): 250 TABLET, FILM COATED ORAL at 05:37

## 2021-11-02 RX ADMIN — NYSTATIN CREAM 1 APPLICATION(S): 100000 CREAM TOPICAL at 17:08

## 2021-11-02 RX ADMIN — HALOPERIDOL DECANOATE 1 MILLIGRAM(S): 100 INJECTION INTRAMUSCULAR at 20:14

## 2021-11-02 RX ADMIN — HALOPERIDOL DECANOATE 0.5 MILLIGRAM(S): 100 INJECTION INTRAMUSCULAR at 09:46

## 2021-11-02 RX ADMIN — Medication 1 DROP(S): at 05:38

## 2021-11-02 RX ADMIN — Medication 0.5 MILLIGRAM(S): at 05:37

## 2021-11-02 RX ADMIN — Medication 1 DROP(S): at 17:08

## 2021-11-02 RX ADMIN — ENOXAPARIN SODIUM 40 MILLIGRAM(S): 100 INJECTION SUBCUTANEOUS at 11:37

## 2021-11-02 RX ADMIN — NYSTATIN CREAM 1 APPLICATION(S): 100000 CREAM TOPICAL at 05:38

## 2021-11-02 NOTE — PROGRESS NOTE ADULT - PROBLEM SELECTOR PLAN 3
Patient hypotensive with MAP <65 on admission  - midodrine 10mg q8 weaned to 7.5mg q8 on 10/17 weaned to 5mg q8 for SBP <100 on 10/18  - d/c'd midodrine 10/19  - MAPs have been >65

## 2021-11-02 NOTE — PROGRESS NOTE ADULT - ATTENDING COMMENTS
Awaiting placement.  Continue to monitor tube feeds, remains at risk for aspiration, however completed course of abx for aspiration.

## 2021-11-02 NOTE — PROGRESS NOTE ADULT - SUBJECTIVE AND OBJECTIVE BOX
*****************************************  Radha Almonte MD I PGY1  Internal Medicine  Pager NS: [phone]  *****************************************      Patient is a 55y old  Male who presents with a chief complaint of septic shock (01 Nov 2021 06:43)      SUBJECTIVE :      MEDICATIONS  (STANDING):  artificial  tears Solution 1 Drop(s) Both EYES every 12 hours  benztropine 0.5 milliGRAM(s) Oral every 12 hours  enoxaparin Injectable 40 milliGRAM(s) SubCutaneous daily  haloperidol    Concentrate 0.5 milliGRAM(s) Oral <User Schedule>  haloperidol    Concentrate 1 milliGRAM(s) Oral <User Schedule>  levETIRAcetam  Solution 500 milliGRAM(s) Oral two times a day  nystatin Powder 1 Application(s) Topical every 12 hours  polyethylene glycol 3350 17 Gram(s) Oral <User Schedule>    MEDICATIONS  (PRN):  acetaminophen    Suspension .. 650 milliGRAM(s) Oral every 6 hours PRN Temp greater or equal to 38C (100.4F), Mild Pain (1 - 3)  ondansetron Injectable 4 milliGRAM(s) IV Push every 8 hours PRN Nausea and/or Vomiting  polyethylene glycol 3350 17 Gram(s) Oral daily PRN Constipation      CAPILLARY BLOOD GLUCOSE        I&O's Summary    31 Oct 2021 07:01  -  01 Nov 2021 07:00  --------------------------------------------------------  IN: 2900 mL / OUT: 0 mL / NET: 2900 mL    01 Nov 2021 07:01  -  02 Nov 2021 06:26  --------------------------------------------------------  IN: 1350 mL / OUT: 1200 mL / NET: 150 mL        PHYSICAL EXAM:  Vital Signs Last 24 Hrs  T(C): 36.4 (02 Nov 2021 05:40), Max: 36.4 (01 Nov 2021 20:50)  T(F): 97.6 (02 Nov 2021 05:40), Max: 97.6 (01 Nov 2021 20:50)  HR: 90 (02 Nov 2021 05:40) (88 - 92)  BP: 97/62 (02 Nov 2021 05:40) (97/62 - 99/67)  BP(mean): --  RR: 18 (02 Nov 2021 05:40) (18 - 18)  SpO2: 96% (02 Nov 2021 05:40) (96% - 100%)    CONSTITUTIONAL: No acute distress, cachetic  EYES: No conjunctival or scleral injection, non-icteric   RESPIRATORY: no cough, lungs clear to ascultation bilaterally with transmitted upper airway sounds   CARDIOVASCULAR: +S1S2, RRR, no M/G/R; pedal pulses full and symmetric; no lower extremity edema  GASTROINTESTINAL: No palpable masses or tenderness, +BS throughout, no rebound/guarding; no hepatosplenomegaly; gtube and colostomy in place  SKIN: L. heel covered in nystatin powder   NEUROLOGIC: alert, tracks movement, nonverbal    LABS:                      RADIOLOGY & ADDITIONAL TESTS:  Results Reviewed: Y  Imaging Personally Reviewed:  Electrocardiogram Personally Reviewed:    COORDINATION OF CARE:  Care Discussed with Consultants/Other Providers [Y/N]:  Prior or Outpatient Records Reviewed [Y/N]:   *****************************************  Radha Almonte MD I PGY1  Internal Medicine  Pager NS: [phone]  *****************************************      Patient is a 55y old  Male who presents with a chief complaint of septic shock (01 Nov 2021 06:43) 2/2 aspiration pneumonia       SUBJECTIVE : NAEO. Pt seen and examined at bedside. Unable to obtain ROS.       MEDICATIONS  (STANDING):  artificial  tears Solution 1 Drop(s) Both EYES every 12 hours  benztropine 0.5 milliGRAM(s) Oral every 12 hours  enoxaparin Injectable 40 milliGRAM(s) SubCutaneous daily  haloperidol    Concentrate 0.5 milliGRAM(s) Oral <User Schedule>  haloperidol    Concentrate 1 milliGRAM(s) Oral <User Schedule>  levETIRAcetam  Solution 500 milliGRAM(s) Oral two times a day  nystatin Powder 1 Application(s) Topical every 12 hours  polyethylene glycol 3350 17 Gram(s) Oral <User Schedule>    MEDICATIONS  (PRN):  acetaminophen    Suspension .. 650 milliGRAM(s) Oral every 6 hours PRN Temp greater or equal to 38C (100.4F), Mild Pain (1 - 3)  ondansetron Injectable 4 milliGRAM(s) IV Push every 8 hours PRN Nausea and/or Vomiting  polyethylene glycol 3350 17 Gram(s) Oral daily PRN Constipation      CAPILLARY BLOOD GLUCOSE        I&O's Summary    31 Oct 2021 07:01  -  01 Nov 2021 07:00  --------------------------------------------------------  IN: 2900 mL / OUT: 0 mL / NET: 2900 mL    01 Nov 2021 07:01  -  02 Nov 2021 06:26  --------------------------------------------------------  IN: 1350 mL / OUT: 1200 mL / NET: 150 mL        PHYSICAL EXAM:  Vital Signs Last 24 Hrs  T(C): 36.4 (02 Nov 2021 05:40), Max: 36.4 (01 Nov 2021 20:50)  T(F): 97.6 (02 Nov 2021 05:40), Max: 97.6 (01 Nov 2021 20:50)  HR: 90 (02 Nov 2021 05:40) (88 - 92)  BP: 97/62 (02 Nov 2021 05:40) (97/62 - 99/67)  BP(mean): --  RR: 18 (02 Nov 2021 05:40) (18 - 18)  SpO2: 96% (02 Nov 2021 05:40) (96% - 100%)    CONSTITUTIONAL: No acute distress, cachetic  EYES: No conjunctival or scleral injection, non-icteric   RESPIRATORY: no cough, lungs clear to ascultation bilaterally with transmitted upper airway sounds   CARDIOVASCULAR: +S1S2, RRR, no M/G/R; pedal pulses full and symmetric; no lower extremity edema  GASTROINTESTINAL: No palpable masses or tenderness, +BS throughout, no rebound/guarding; no hepatosplenomegaly; gtube and colostomy in place  SKIN: L. heel covered in nystatin powder   NEUROLOGIC: alert, tracks movement, nonverbal    LABS:                      RADIOLOGY & ADDITIONAL TESTS:  Results Reviewed: Y  Imaging Personally Reviewed:  Electrocardiogram Personally Reviewed:    COORDINATION OF CARE:  Care Discussed with Consultants/Other Providers [Y/N]:  Prior or Outpatient Records Reviewed [Y/N]:

## 2021-11-02 NOTE — PROGRESS NOTE ADULT - ASSESSMENT
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

## 2021-11-03 PROCEDURE — 99232 SBSQ HOSP IP/OBS MODERATE 35: CPT | Mod: GC

## 2021-11-03 RX ADMIN — NYSTATIN CREAM 1 APPLICATION(S): 100000 CREAM TOPICAL at 18:07

## 2021-11-03 RX ADMIN — NYSTATIN CREAM 1 APPLICATION(S): 100000 CREAM TOPICAL at 05:12

## 2021-11-03 RX ADMIN — LEVETIRACETAM 500 MILLIGRAM(S): 250 TABLET, FILM COATED ORAL at 18:07

## 2021-11-03 RX ADMIN — ENOXAPARIN SODIUM 40 MILLIGRAM(S): 100 INJECTION SUBCUTANEOUS at 12:10

## 2021-11-03 RX ADMIN — Medication 0.5 MILLIGRAM(S): at 18:07

## 2021-11-03 RX ADMIN — HALOPERIDOL DECANOATE 0.5 MILLIGRAM(S): 100 INJECTION INTRAMUSCULAR at 08:40

## 2021-11-03 RX ADMIN — LEVETIRACETAM 500 MILLIGRAM(S): 250 TABLET, FILM COATED ORAL at 05:12

## 2021-11-03 RX ADMIN — Medication 0.5 MILLIGRAM(S): at 05:12

## 2021-11-03 RX ADMIN — HALOPERIDOL DECANOATE 1 MILLIGRAM(S): 100 INJECTION INTRAMUSCULAR at 22:09

## 2021-11-03 RX ADMIN — Medication 1 DROP(S): at 05:12

## 2021-11-03 RX ADMIN — Medication 1 DROP(S): at 18:07

## 2021-11-03 NOTE — PROGRESS NOTE ADULT - ATTENDING COMMENTS
Patient seen and examined. Case discussed with the medical team on rounds. I agree with the findings and the plan above.    On tube feeds w/dispo planning  Continue the rest of the work up and management as stated above.

## 2021-11-03 NOTE — PROGRESS NOTE ADULT - SUBJECTIVE AND OBJECTIVE BOX
*****************************************  Radha Almonte MD I PGY1  Internal Medicine  Pager NS: [phone]  *****************************************      Patient is a 55y old  Male who presents with a chief complaint of septic shock (02 Nov 2021 06:26)      SUBJECTIVE :      MEDICATIONS  (STANDING):  artificial  tears Solution 1 Drop(s) Both EYES every 12 hours  benztropine 0.5 milliGRAM(s) Oral every 12 hours  enoxaparin Injectable 40 milliGRAM(s) SubCutaneous daily  haloperidol    Concentrate 0.5 milliGRAM(s) Oral <User Schedule>  haloperidol    Concentrate 1 milliGRAM(s) Oral <User Schedule>  levETIRAcetam  Solution 500 milliGRAM(s) Oral two times a day  nystatin Powder 1 Application(s) Topical every 12 hours  polyethylene glycol 3350 17 Gram(s) Oral <User Schedule>    MEDICATIONS  (PRN):  acetaminophen    Suspension .. 650 milliGRAM(s) Oral every 6 hours PRN Temp greater or equal to 38C (100.4F), Mild Pain (1 - 3)  ondansetron Injectable 4 milliGRAM(s) IV Push every 8 hours PRN Nausea and/or Vomiting  polyethylene glycol 3350 17 Gram(s) Oral daily PRN Constipation      CAPILLARY BLOOD GLUCOSE        I&O's Summary    01 Nov 2021 07:01  -  02 Nov 2021 07:00  --------------------------------------------------------  IN: 1350 mL / OUT: 1200 mL / NET: 150 mL    02 Nov 2021 07:01  -  03 Nov 2021 06:33  --------------------------------------------------------  IN: 1350 mL / OUT: 0 mL / NET: 1350 mL        PHYSICAL EXAM:  Vital Signs Last 24 Hrs  T(C): 36.6 (03 Nov 2021 05:09), Max: 36.6 (03 Nov 2021 05:09)  T(F): 97.8 (03 Nov 2021 05:09), Max: 97.8 (03 Nov 2021 05:09)  HR: 85 (03 Nov 2021 05:09) (85 - 88)  BP: 94/66 (03 Nov 2021 05:09) (93/64 - 96/71)  BP(mean): --  RR: 18 (03 Nov 2021 05:09) (18 - 18)  SpO2: 97% (03 Nov 2021 05:09) (97% - 98%)    CONSTITUTIONAL: No acute distress, cachetic  EYES: No conjunctival or scleral injection, non-icteric   RESPIRATORY: no cough, lungs clear to ascultation bilaterally with transmitted upper airway sounds   CARDIOVASCULAR: +S1S2, RRR, no M/G/R; pedal pulses full and symmetric; no lower extremity edema  GASTROINTESTINAL: No palpable masses or tenderness, +BS throughout, no rebound/guarding; no hepatosplenomegaly; gtube and colostomy in place  SKIN: L. heel covered in nystatin powder   NEUROLOGIC: alert, tracks movement, nonverbal    LABS:                      RADIOLOGY & ADDITIONAL TESTS:  Results Reviewed: Y  Imaging Personally Reviewed:  Electrocardiogram Personally Reviewed:    COORDINATION OF CARE:  Care Discussed with Consultants/Other Providers [Y/N]:  Prior or Outpatient Records Reviewed [Y/N]:   *****************************************  Radha Almonte MD I PGY1  Internal Medicine  Pager NS: [phone]  *****************************************      Patient is a 55y old  Male who presents with a chief complaint of septic shock (02 Nov 2021 06:26)      SUBJECTIVE : Review of system unobtainable secondary to mental status.      MEDICATIONS  (STANDING):  artificial  tears Solution 1 Drop(s) Both EYES every 12 hours  benztropine 0.5 milliGRAM(s) Oral every 12 hours  enoxaparin Injectable 40 milliGRAM(s) SubCutaneous daily  haloperidol    Concentrate 0.5 milliGRAM(s) Oral <User Schedule>  haloperidol    Concentrate 1 milliGRAM(s) Oral <User Schedule>  levETIRAcetam  Solution 500 milliGRAM(s) Oral two times a day  nystatin Powder 1 Application(s) Topical every 12 hours  polyethylene glycol 3350 17 Gram(s) Oral <User Schedule>    MEDICATIONS  (PRN):  acetaminophen    Suspension .. 650 milliGRAM(s) Oral every 6 hours PRN Temp greater or equal to 38C (100.4F), Mild Pain (1 - 3)  ondansetron Injectable 4 milliGRAM(s) IV Push every 8 hours PRN Nausea and/or Vomiting  polyethylene glycol 3350 17 Gram(s) Oral daily PRN Constipation      CAPILLARY BLOOD GLUCOSE        I&O's Summary    01 Nov 2021 07:01  -  02 Nov 2021 07:00  --------------------------------------------------------  IN: 1350 mL / OUT: 1200 mL / NET: 150 mL    02 Nov 2021 07:01  -  03 Nov 2021 06:33  --------------------------------------------------------  IN: 1350 mL / OUT: 0 mL / NET: 1350 mL        PHYSICAL EXAM:  Vital Signs Last 24 Hrs  T(C): 36.6 (03 Nov 2021 05:09), Max: 36.6 (03 Nov 2021 05:09)  T(F): 97.8 (03 Nov 2021 05:09), Max: 97.8 (03 Nov 2021 05:09)  HR: 85 (03 Nov 2021 05:09) (85 - 88)  BP: 94/66 (03 Nov 2021 05:09) (93/64 - 96/71)  BP(mean): --  RR: 18 (03 Nov 2021 05:09) (18 - 18)  SpO2: 97% (03 Nov 2021 05:09) (97% - 98%)    CONSTITUTIONAL: No acute distress, cachetic  EYES: No conjunctival or scleral injection, non-icteric   RESPIRATORY: no cough, lungs clear to ascultation bilaterally with transmitted upper airway sounds   CARDIOVASCULAR: +S1S2, RRR, no M/G/R; pedal pulses full and symmetric; no lower extremity edema  GASTROINTESTINAL: No palpable masses or tenderness, +BS throughout, no rebound/guarding; no hepatosplenomegaly; gtube and colostomy in place  SKIN: L. heel covered in nystatin powder   NEUROLOGIC: alert, tracks movement, nonverbal    LABS:                      RADIOLOGY & ADDITIONAL TESTS:  Results Reviewed: Y  Imaging Personally Reviewed:  Electrocardiogram Personally Reviewed:    COORDINATION OF CARE:  Care Discussed with Consultants/Other Providers [Y/N]:  Prior or Outpatient Records Reviewed [Y/N]:

## 2021-11-04 LAB
ALBUMIN SERPL ELPH-MCNC: 2.7 G/DL — LOW (ref 3.3–5)
ALP SERPL-CCNC: 64 U/L — SIGNIFICANT CHANGE UP (ref 40–120)
ALT FLD-CCNC: 34 U/L — SIGNIFICANT CHANGE UP (ref 4–41)
ANION GAP SERPL CALC-SCNC: 11 MMOL/L — SIGNIFICANT CHANGE UP (ref 7–14)
ANION GAP SERPL CALC-SCNC: 9 MMOL/L — SIGNIFICANT CHANGE UP (ref 7–14)
AST SERPL-CCNC: 37 U/L — SIGNIFICANT CHANGE UP (ref 4–40)
BASOPHILS # BLD AUTO: 0.01 K/UL — SIGNIFICANT CHANGE UP (ref 0–0.2)
BASOPHILS NFR BLD AUTO: 0.2 % — SIGNIFICANT CHANGE UP (ref 0–2)
BILIRUB SERPL-MCNC: 0.2 MG/DL — SIGNIFICANT CHANGE UP (ref 0.2–1.2)
BUN SERPL-MCNC: 13 MG/DL — SIGNIFICANT CHANGE UP (ref 7–23)
BUN SERPL-MCNC: 16 MG/DL — SIGNIFICANT CHANGE UP (ref 7–23)
CALCIUM SERPL-MCNC: 8.9 MG/DL — SIGNIFICANT CHANGE UP (ref 8.4–10.5)
CALCIUM SERPL-MCNC: 8.9 MG/DL — SIGNIFICANT CHANGE UP (ref 8.4–10.5)
CHLORIDE SERPL-SCNC: 96 MMOL/L — LOW (ref 98–107)
CHLORIDE SERPL-SCNC: 99 MMOL/L — SIGNIFICANT CHANGE UP (ref 98–107)
CO2 SERPL-SCNC: 25 MMOL/L — SIGNIFICANT CHANGE UP (ref 22–31)
CO2 SERPL-SCNC: 27 MMOL/L — SIGNIFICANT CHANGE UP (ref 22–31)
CREAT SERPL-MCNC: 0.39 MG/DL — LOW (ref 0.5–1.3)
CREAT SERPL-MCNC: 0.4 MG/DL — LOW (ref 0.5–1.3)
EOSINOPHIL # BLD AUTO: 0.02 K/UL — SIGNIFICANT CHANGE UP (ref 0–0.5)
EOSINOPHIL NFR BLD AUTO: 0.4 % — SIGNIFICANT CHANGE UP (ref 0–6)
GLUCOSE SERPL-MCNC: 141 MG/DL — HIGH (ref 70–99)
GLUCOSE SERPL-MCNC: 98 MG/DL — SIGNIFICANT CHANGE UP (ref 70–99)
HCT VFR BLD CALC: 40.2 % — SIGNIFICANT CHANGE UP (ref 39–50)
HGB BLD-MCNC: 13.7 G/DL — SIGNIFICANT CHANGE UP (ref 13–17)
IANC: 3.9 K/UL — SIGNIFICANT CHANGE UP (ref 1.5–8.5)
IMM GRANULOCYTES NFR BLD AUTO: 0.2 % — SIGNIFICANT CHANGE UP (ref 0–1.5)
LYMPHOCYTES # BLD AUTO: 1.23 K/UL — SIGNIFICANT CHANGE UP (ref 1–3.3)
LYMPHOCYTES # BLD AUTO: 22.1 % — SIGNIFICANT CHANGE UP (ref 13–44)
MAGNESIUM SERPL-MCNC: 2.2 MG/DL — SIGNIFICANT CHANGE UP (ref 1.6–2.6)
MCHC RBC-ENTMCNC: 31.7 PG — SIGNIFICANT CHANGE UP (ref 27–34)
MCHC RBC-ENTMCNC: 34.1 GM/DL — SIGNIFICANT CHANGE UP (ref 32–36)
MCV RBC AUTO: 93.1 FL — SIGNIFICANT CHANGE UP (ref 80–100)
MONOCYTES # BLD AUTO: 0.39 K/UL — SIGNIFICANT CHANGE UP (ref 0–0.9)
MONOCYTES NFR BLD AUTO: 7 % — SIGNIFICANT CHANGE UP (ref 2–14)
NEUTROPHILS # BLD AUTO: 3.9 K/UL — SIGNIFICANT CHANGE UP (ref 1.8–7.4)
NEUTROPHILS NFR BLD AUTO: 70.1 % — SIGNIFICANT CHANGE UP (ref 43–77)
NRBC # BLD: 0 /100 WBCS — SIGNIFICANT CHANGE UP
NRBC # FLD: 0 K/UL — SIGNIFICANT CHANGE UP
PHOSPHATE SERPL-MCNC: 3.7 MG/DL — SIGNIFICANT CHANGE UP (ref 2.5–4.5)
PLATELET # BLD AUTO: 287 K/UL — SIGNIFICANT CHANGE UP (ref 150–400)
POTASSIUM SERPL-MCNC: 4 MMOL/L — SIGNIFICANT CHANGE UP (ref 3.5–5.3)
POTASSIUM SERPL-MCNC: 5.1 MMOL/L — SIGNIFICANT CHANGE UP (ref 3.5–5.3)
POTASSIUM SERPL-SCNC: 4 MMOL/L — SIGNIFICANT CHANGE UP (ref 3.5–5.3)
POTASSIUM SERPL-SCNC: 5.1 MMOL/L — SIGNIFICANT CHANGE UP (ref 3.5–5.3)
PROT SERPL-MCNC: 6.5 G/DL — SIGNIFICANT CHANGE UP (ref 6–8.3)
RBC # BLD: 4.32 M/UL — SIGNIFICANT CHANGE UP (ref 4.2–5.8)
RBC # FLD: 13.2 % — SIGNIFICANT CHANGE UP (ref 10.3–14.5)
SODIUM SERPL-SCNC: 132 MMOL/L — LOW (ref 135–145)
SODIUM SERPL-SCNC: 135 MMOL/L — SIGNIFICANT CHANGE UP (ref 135–145)
WBC # BLD: 5.56 K/UL — SIGNIFICANT CHANGE UP (ref 3.8–10.5)
WBC # FLD AUTO: 5.56 K/UL — SIGNIFICANT CHANGE UP (ref 3.8–10.5)

## 2021-11-04 PROCEDURE — 99231 SBSQ HOSP IP/OBS SF/LOW 25: CPT | Mod: GC

## 2021-11-04 RX ADMIN — NYSTATIN CREAM 1 APPLICATION(S): 100000 CREAM TOPICAL at 17:53

## 2021-11-04 RX ADMIN — HALOPERIDOL DECANOATE 0.5 MILLIGRAM(S): 100 INJECTION INTRAMUSCULAR at 10:52

## 2021-11-04 RX ADMIN — Medication 0.5 MILLIGRAM(S): at 05:34

## 2021-11-04 RX ADMIN — POLYETHYLENE GLYCOL 3350 17 GRAM(S): 17 POWDER, FOR SOLUTION ORAL at 18:03

## 2021-11-04 RX ADMIN — Medication 1 DROP(S): at 17:52

## 2021-11-04 RX ADMIN — LEVETIRACETAM 500 MILLIGRAM(S): 250 TABLET, FILM COATED ORAL at 05:33

## 2021-11-04 RX ADMIN — LEVETIRACETAM 500 MILLIGRAM(S): 250 TABLET, FILM COATED ORAL at 17:52

## 2021-11-04 RX ADMIN — NYSTATIN CREAM 1 APPLICATION(S): 100000 CREAM TOPICAL at 05:34

## 2021-11-04 RX ADMIN — ENOXAPARIN SODIUM 40 MILLIGRAM(S): 100 INJECTION SUBCUTANEOUS at 10:52

## 2021-11-04 RX ADMIN — Medication 1 DROP(S): at 05:35

## 2021-11-04 RX ADMIN — HALOPERIDOL DECANOATE 1 MILLIGRAM(S): 100 INJECTION INTRAMUSCULAR at 20:20

## 2021-11-04 RX ADMIN — Medication 0.5 MILLIGRAM(S): at 17:53

## 2021-11-04 NOTE — PROGRESS NOTE ADULT - SUBJECTIVE AND OBJECTIVE BOX
*****************************************  Radha Almonte MD I PGY1  Internal Medicine  Pager NS: [phone]  *****************************************      Patient is a 55y old  Male who presents with a chief complaint of septic shock (03 Nov 2021 06:33)      SUBJECTIVE :      MEDICATIONS  (STANDING):  artificial  tears Solution 1 Drop(s) Both EYES every 12 hours  benztropine 0.5 milliGRAM(s) Oral every 12 hours  enoxaparin Injectable 40 milliGRAM(s) SubCutaneous daily  haloperidol    Concentrate 1 milliGRAM(s) Oral <User Schedule>  haloperidol    Concentrate 0.5 milliGRAM(s) Oral <User Schedule>  levETIRAcetam  Solution 500 milliGRAM(s) Oral two times a day  nystatin Powder 1 Application(s) Topical every 12 hours  polyethylene glycol 3350 17 Gram(s) Oral <User Schedule>    MEDICATIONS  (PRN):  acetaminophen    Suspension .. 650 milliGRAM(s) Oral every 6 hours PRN Temp greater or equal to 38C (100.4F), Mild Pain (1 - 3)  ondansetron Injectable 4 milliGRAM(s) IV Push every 8 hours PRN Nausea and/or Vomiting  polyethylene glycol 3350 17 Gram(s) Oral daily PRN Constipation      CAPILLARY BLOOD GLUCOSE        I&O's Summary    02 Nov 2021 07:01  -  03 Nov 2021 07:00  --------------------------------------------------------  IN: 1350 mL / OUT: 1700 mL / NET: -350 mL    03 Nov 2021 07:01  -  04 Nov 2021 06:33  --------------------------------------------------------  IN: 2150 mL / OUT: 600 mL / NET: 1550 mL        PHYSICAL EXAM:  Vital Signs Last 24 Hrs  T(C): 36.4 (04 Nov 2021 05:28), Max: 36.6 (04 Nov 2021 05:19)  T(F): 97.5 (04 Nov 2021 05:28), Max: 97.8 (04 Nov 2021 05:19)  HR: 87 (04 Nov 2021 05:28) (87 - 94)  BP: 95/62 (04 Nov 2021 05:28) (95/62 - 99/68)  BP(mean): --  RR: 17 (04 Nov 2021 05:28) (17 - 18)  SpO2: 96% (04 Nov 2021 05:28) (96% - 99%)    CONSTITUTIONAL: No acute distress, cachetic  EYES: No conjunctival or scleral injection, non-icteric   RESPIRATORY: no cough, lungs clear to ascultation bilaterally with transmitted upper airway sounds   CARDIOVASCULAR: +S1S2, RRR, no M/G/R; pedal pulses full and symmetric; no lower extremity edema  GASTROINTESTINAL: No palpable masses or tenderness, +BS throughout, no rebound/guarding; no hepatosplenomegaly; gtube and colostomy in place  SKIN: L. heel covered in nystatin powder   NEUROLOGIC: alert, tracks movement, nonverbal    LABS:                      RADIOLOGY & ADDITIONAL TESTS:  Results Reviewed: Y  Imaging Personally Reviewed:  Electrocardiogram Personally Reviewed:    COORDINATION OF CARE:  Care Discussed with Consultants/Other Providers [Y/N]:  Prior or Outpatient Records Reviewed [Y/N]:   *****************************************  Radha Almonte MD I PGY1  Internal Medicine  Pager NS: [phone]  *****************************************      Patient is a 55y old  Male who presents with a chief complaint of septic shock (03 Nov 2021 06:33)      SUBJECTIVE : NAEO. Pt seen and examined at bedside. Unable to obtain ROS    MEDICATIONS  (STANDING):  artificial  tears Solution 1 Drop(s) Both EYES every 12 hours  benztropine 0.5 milliGRAM(s) Oral every 12 hours  enoxaparin Injectable 40 milliGRAM(s) SubCutaneous daily  haloperidol    Concentrate 1 milliGRAM(s) Oral <User Schedule>  haloperidol    Concentrate 0.5 milliGRAM(s) Oral <User Schedule>  levETIRAcetam  Solution 500 milliGRAM(s) Oral two times a day  nystatin Powder 1 Application(s) Topical every 12 hours  polyethylene glycol 3350 17 Gram(s) Oral <User Schedule>    MEDICATIONS  (PRN):  acetaminophen    Suspension .. 650 milliGRAM(s) Oral every 6 hours PRN Temp greater or equal to 38C (100.4F), Mild Pain (1 - 3)  ondansetron Injectable 4 milliGRAM(s) IV Push every 8 hours PRN Nausea and/or Vomiting  polyethylene glycol 3350 17 Gram(s) Oral daily PRN Constipation      CAPILLARY BLOOD GLUCOSE        I&O's Summary    02 Nov 2021 07:01  -  03 Nov 2021 07:00  --------------------------------------------------------  IN: 1350 mL / OUT: 1700 mL / NET: -350 mL    03 Nov 2021 07:01  -  04 Nov 2021 06:33  --------------------------------------------------------  IN: 2150 mL / OUT: 600 mL / NET: 1550 mL        PHYSICAL EXAM:  Vital Signs Last 24 Hrs  T(C): 36.4 (04 Nov 2021 05:28), Max: 36.6 (04 Nov 2021 05:19)  T(F): 97.5 (04 Nov 2021 05:28), Max: 97.8 (04 Nov 2021 05:19)  HR: 87 (04 Nov 2021 05:28) (87 - 94)  BP: 95/62 (04 Nov 2021 05:28) (95/62 - 99/68)  BP(mean): --  RR: 17 (04 Nov 2021 05:28) (17 - 18)  SpO2: 96% (04 Nov 2021 05:28) (96% - 99%)    CONSTITUTIONAL: No acute distress, cachetic  EYES: No conjunctival or scleral injection, non-icteric   RESPIRATORY: no cough, lungs clear to ascultation bilaterally with transmitted upper airway sounds   CARDIOVASCULAR: +S1S2, RRR, no M/G/R; pedal pulses full and symmetric; no lower extremity edema  GASTROINTESTINAL: No palpable masses or tenderness, +BS throughout, no rebound/guarding; no hepatosplenomegaly; gtube and colostomy in place  SKIN: L. heel covered in nystatin powder   NEUROLOGIC: alert, tracks movement, nonverbal    LABS:                      RADIOLOGY & ADDITIONAL TESTS:  Results Reviewed: Y  Imaging Personally Reviewed:  Electrocardiogram Personally Reviewed:    COORDINATION OF CARE:  Care Discussed with Consultants/Other Providers [Y/N]:  Prior or Outpatient Records Reviewed [Y/N]:

## 2021-11-04 NOTE — PROGRESS NOTE ADULT - ATTENDING COMMENTS
Patient seen and examined. Case discussed with the medical team on rounds. I agree with the findings and the plan above.    On tube feeds w/dispo planning. Slightly hyponatremic this morning, labs redrawn, appears to have been a lab error.  Continue the rest of the work up and management as stated above.

## 2021-11-05 PROCEDURE — 99231 SBSQ HOSP IP/OBS SF/LOW 25: CPT | Mod: GC

## 2021-11-05 RX ADMIN — NYSTATIN CREAM 1 APPLICATION(S): 100000 CREAM TOPICAL at 17:09

## 2021-11-05 RX ADMIN — Medication 1 DROP(S): at 17:08

## 2021-11-05 RX ADMIN — LEVETIRACETAM 500 MILLIGRAM(S): 250 TABLET, FILM COATED ORAL at 17:09

## 2021-11-05 RX ADMIN — Medication 0.5 MILLIGRAM(S): at 17:09

## 2021-11-05 RX ADMIN — HALOPERIDOL DECANOATE 1 MILLIGRAM(S): 100 INJECTION INTRAMUSCULAR at 21:39

## 2021-11-05 RX ADMIN — ENOXAPARIN SODIUM 40 MILLIGRAM(S): 100 INJECTION SUBCUTANEOUS at 11:24

## 2021-11-05 RX ADMIN — LEVETIRACETAM 500 MILLIGRAM(S): 250 TABLET, FILM COATED ORAL at 05:20

## 2021-11-05 RX ADMIN — Medication 0.5 MILLIGRAM(S): at 05:20

## 2021-11-05 RX ADMIN — NYSTATIN CREAM 1 APPLICATION(S): 100000 CREAM TOPICAL at 05:20

## 2021-11-05 RX ADMIN — Medication 1 DROP(S): at 05:20

## 2021-11-05 RX ADMIN — HALOPERIDOL DECANOATE 0.5 MILLIGRAM(S): 100 INJECTION INTRAMUSCULAR at 09:00

## 2021-11-05 NOTE — PROGRESS NOTE ADULT - SUBJECTIVE AND OBJECTIVE BOX
*****************************************  Radha Almonte MD I PGY1  Internal Medicine  Pager NS: [phone]  *****************************************      Patient is a 55y old  Male who presents with a chief complaint of septic shock (04 Nov 2021 06:33)      SUBJECTIVE :      MEDICATIONS  (STANDING):  artificial  tears Solution 1 Drop(s) Both EYES every 12 hours  benztropine 0.5 milliGRAM(s) Oral every 12 hours  enoxaparin Injectable 40 milliGRAM(s) SubCutaneous daily  haloperidol    Concentrate 0.5 milliGRAM(s) Oral <User Schedule>  haloperidol    Concentrate 1 milliGRAM(s) Oral <User Schedule>  levETIRAcetam  Solution 500 milliGRAM(s) Oral two times a day  nystatin Powder 1 Application(s) Topical every 12 hours  polyethylene glycol 3350 17 Gram(s) Oral <User Schedule>    MEDICATIONS  (PRN):  acetaminophen    Suspension .. 650 milliGRAM(s) Oral every 6 hours PRN Temp greater or equal to 38C (100.4F), Mild Pain (1 - 3)  ondansetron Injectable 4 milliGRAM(s) IV Push every 8 hours PRN Nausea and/or Vomiting  polyethylene glycol 3350 17 Gram(s) Oral daily PRN Constipation      CAPILLARY BLOOD GLUCOSE        I&O's Summary    03 Nov 2021 07:01  -  04 Nov 2021 07:00  --------------------------------------------------------  IN: 2150 mL / OUT: 600 mL / NET: 1550 mL    04 Nov 2021 07:01  -  05 Nov 2021 06:41  --------------------------------------------------------  IN: 2700 mL / OUT: 0 mL / NET: 2700 mL        PHYSICAL EXAM:  Vital Signs Last 24 Hrs  T(C): 37 (05 Nov 2021 05:18), Max: 37 (05 Nov 2021 05:18)  T(F): 98.6 (05 Nov 2021 05:18), Max: 98.6 (05 Nov 2021 05:18)  HR: 88 (05 Nov 2021 05:18) (88 - 91)  BP: 95/62 (05 Nov 2021 05:18) (95/62 - 98/65)  BP(mean): --  RR: 17 (05 Nov 2021 05:18) (17 - 18)  SpO2: 98% (05 Nov 2021 05:18) (96% - 100%)    CONSTITUTIONAL: No acute distress, cachetic  EYES: No conjunctival or scleral injection, non-icteric   RESPIRATORY: no cough, lungs clear to ascultation bilaterally with transmitted upper airway sounds   CARDIOVASCULAR: +S1S2, RRR, no M/G/R; pedal pulses full and symmetric; no lower extremity edema  GASTROINTESTINAL: No palpable masses or tenderness, +BS throughout, no rebound/guarding; no hepatosplenomegaly; gtube and colostomy in place  SKIN: L. heel covered in nystatin powder   NEUROLOGIC: alert, tracks movement, nonverbal      LABS:                        13.7   5.56  )-----------( 287      ( 04 Nov 2021 07:37 )             40.2     11-04    135  |  99  |  13  ----------------------------<  98  4.0   |  27  |  0.39<L>    Ca    8.9      04 Nov 2021 11:12  Phos  3.7     11-04  Mg     2.20     11-04    TPro  6.5  /  Alb  2.7<L>  /  TBili  0.2  /  DBili  x   /  AST  37  /  ALT  34  /  AlkPhos  64  11-04                RADIOLOGY & ADDITIONAL TESTS:  Results Reviewed: Y  Imaging Personally Reviewed:  Electrocardiogram Personally Reviewed:    COORDINATION OF CARE:  Care Discussed with Consultants/Other Providers [Y/N]:  Prior or Outpatient Records Reviewed [Y/N]:   *****************************************  Radha Almonte MD I PGY1  Internal Medicine  Pager NS: [phone]  *****************************************      Patient is a 55y old  Male who presents with a chief complaint of septic shock (04 Nov 2021 06:33)      SUBJECTIVE : NAEO. Pt seen and examined at bedside. Unable to obtain ROS.       MEDICATIONS  (STANDING):  artificial  tears Solution 1 Drop(s) Both EYES every 12 hours  benztropine 0.5 milliGRAM(s) Oral every 12 hours  enoxaparin Injectable 40 milliGRAM(s) SubCutaneous daily  haloperidol    Concentrate 0.5 milliGRAM(s) Oral <User Schedule>  haloperidol    Concentrate 1 milliGRAM(s) Oral <User Schedule>  levETIRAcetam  Solution 500 milliGRAM(s) Oral two times a day  nystatin Powder 1 Application(s) Topical every 12 hours  polyethylene glycol 3350 17 Gram(s) Oral <User Schedule>    MEDICATIONS  (PRN):  acetaminophen    Suspension .. 650 milliGRAM(s) Oral every 6 hours PRN Temp greater or equal to 38C (100.4F), Mild Pain (1 - 3)  ondansetron Injectable 4 milliGRAM(s) IV Push every 8 hours PRN Nausea and/or Vomiting  polyethylene glycol 3350 17 Gram(s) Oral daily PRN Constipation      CAPILLARY BLOOD GLUCOSE        I&O's Summary    03 Nov 2021 07:01  -  04 Nov 2021 07:00  --------------------------------------------------------  IN: 2150 mL / OUT: 600 mL / NET: 1550 mL    04 Nov 2021 07:01  -  05 Nov 2021 06:41  --------------------------------------------------------  IN: 2700 mL / OUT: 0 mL / NET: 2700 mL        PHYSICAL EXAM:  Vital Signs Last 24 Hrs  T(C): 37 (05 Nov 2021 05:18), Max: 37 (05 Nov 2021 05:18)  T(F): 98.6 (05 Nov 2021 05:18), Max: 98.6 (05 Nov 2021 05:18)  HR: 88 (05 Nov 2021 05:18) (88 - 91)  BP: 95/62 (05 Nov 2021 05:18) (95/62 - 98/65)  BP(mean): --  RR: 17 (05 Nov 2021 05:18) (17 - 18)  SpO2: 98% (05 Nov 2021 05:18) (96% - 100%)    CONSTITUTIONAL: No acute distress, cachetic  EYES: No conjunctival or scleral injection, non-icteric   RESPIRATORY: no cough, lungs clear to ascultation bilaterally with transmitted upper airway sounds   CARDIOVASCULAR: +S1S2, RRR, no M/G/R; pedal pulses full and symmetric; no lower extremity edema  GASTROINTESTINAL: No palpable masses or tenderness, +BS throughout, no rebound/guarding; no hepatosplenomegaly; gtube and colostomy in place  SKIN: L. heel covered in nystatin powder   NEUROLOGIC: alert, tracks movement, nonverbal      LABS:                        13.7   5.56  )-----------( 287      ( 04 Nov 2021 07:37 )             40.2     11-04    135  |  99  |  13  ----------------------------<  98  4.0   |  27  |  0.39<L>    Ca    8.9      04 Nov 2021 11:12  Phos  3.7     11-04  Mg     2.20     11-04    TPro  6.5  /  Alb  2.7<L>  /  TBili  0.2  /  DBili  x   /  AST  37  /  ALT  34  /  AlkPhos  64  11-04                RADIOLOGY & ADDITIONAL TESTS:  Results Reviewed: Y  Imaging Personally Reviewed:  Electrocardiogram Personally Reviewed:    COORDINATION OF CARE:  Care Discussed with Consultants/Other Providers [Y/N]:  Prior or Outpatient Records Reviewed [Y/N]:

## 2021-11-05 NOTE — PROGRESS NOTE ADULT - ATTENDING COMMENTS
Patient seen and examined. Case discussed with the medical team on rounds. I agree with the findings and the plan above.    On tube feeds with continued dispo planning  Still awaiting placement  Continue the rest of the work up and management as stated above.

## 2021-11-06 PROCEDURE — 99231 SBSQ HOSP IP/OBS SF/LOW 25: CPT | Mod: GC

## 2021-11-06 RX ADMIN — Medication 0.5 MILLIGRAM(S): at 05:29

## 2021-11-06 RX ADMIN — HALOPERIDOL DECANOATE 1 MILLIGRAM(S): 100 INJECTION INTRAMUSCULAR at 22:13

## 2021-11-06 RX ADMIN — NYSTATIN CREAM 1 APPLICATION(S): 100000 CREAM TOPICAL at 05:30

## 2021-11-06 RX ADMIN — NYSTATIN CREAM 1 APPLICATION(S): 100000 CREAM TOPICAL at 18:38

## 2021-11-06 RX ADMIN — Medication 1 DROP(S): at 18:37

## 2021-11-06 RX ADMIN — ENOXAPARIN SODIUM 40 MILLIGRAM(S): 100 INJECTION SUBCUTANEOUS at 12:14

## 2021-11-06 RX ADMIN — Medication 0.5 MILLIGRAM(S): at 18:38

## 2021-11-06 RX ADMIN — HALOPERIDOL DECANOATE 0.5 MILLIGRAM(S): 100 INJECTION INTRAMUSCULAR at 09:12

## 2021-11-06 RX ADMIN — LEVETIRACETAM 500 MILLIGRAM(S): 250 TABLET, FILM COATED ORAL at 18:37

## 2021-11-06 RX ADMIN — LEVETIRACETAM 500 MILLIGRAM(S): 250 TABLET, FILM COATED ORAL at 05:29

## 2021-11-06 RX ADMIN — Medication 1 DROP(S): at 05:29

## 2021-11-06 NOTE — PROGRESS NOTE ADULT - SUBJECTIVE AND OBJECTIVE BOX
Patient is a 55y old  Male who presents with a chief complaint of septic shock (05 Nov 2021 06:41)      SUBJECTIVE / OVERNIGHT EVENTS:  no acute events overnight   chart reviewed, slightly hypotensive to 95/64, heart rate to 102   does not answer questions   opens eyes     MEDICATIONS  (STANDING):  artificial  tears Solution 1 Drop(s) Both EYES every 12 hours  benztropine 0.5 milliGRAM(s) Oral every 12 hours  enoxaparin Injectable 40 milliGRAM(s) SubCutaneous daily  haloperidol    Concentrate 1 milliGRAM(s) Oral <User Schedule>  haloperidol    Concentrate 0.5 milliGRAM(s) Oral <User Schedule>  levETIRAcetam  Solution 500 milliGRAM(s) Oral two times a day  nystatin Powder 1 Application(s) Topical every 12 hours  polyethylene glycol 3350 17 Gram(s) Oral <User Schedule>    MEDICATIONS  (PRN):  acetaminophen    Suspension .. 650 milliGRAM(s) Oral every 6 hours PRN Temp greater or equal to 38C (100.4F), Mild Pain (1 - 3)  ondansetron Injectable 4 milliGRAM(s) IV Push every 8 hours PRN Nausea and/or Vomiting  polyethylene glycol 3350 17 Gram(s) Oral daily PRN Constipation      CAPILLARY BLOOD GLUCOSE        I&O's Summary    05 Nov 2021 07:01  -  06 Nov 2021 07:00  --------------------------------------------------------  IN: 1100 mL / OUT: 0 mL / NET: 1100 mL        PHYSICAL EXAM:  Vital Signs Last 24 Hrs  T(C): 36.5 (11-06-21 @ 05:26)  T(F): 97.7 (11-06-21 @ 05:26), Max: 97.8 (11-05-21 @ 22:35)  HR: 102 (11-06-21 @ 05:26) (91 - 102)  BP: 95/64 (11-06-21 @ 05:26)  BP(mean): --  RR: 18 (11-06-21 @ 05:26) (18 - 19)  SpO2: 95% (11-06-21 @ 05:26) (95% - 100%)  Wt(kg): --    11-05 @ 07:01  -  11-06 @ 07:00  --------------------------------------------------------  IN: 1100 mL / OUT: 0 mL / NET: 1100 mL      CONSTITUTIONAL: No acute distress, cachetic  EYES: No conjunctival or scleral injection, non-icteric   RESPIRATORY: no cough, lungs clear to ascultation bilaterally with transmitted upper airway sounds   CARDIOVASCULAR: +S1S2, RRR, no M/G/R; pedal pulses full and symmetric; no lower extremity edema  GASTROINTESTINAL: No palpable masses or tenderness, +BS throughout, no rebound/guarding; no hepatosplenomegaly; gtube and colostomy in place  SKIN: L. heel covered in nystatin powder   NEUROLOGIC: alert, tracks movement, nonverbal    Personally reviewed imaging, labs, EKG  LABS:    11-04    135  |  99  |  13  ----------------------------<  98  4.0   |  27  |  0.39<L>    Ca    8.9      04 Nov 2021 11:12

## 2021-11-06 NOTE — PROGRESS NOTE ADULT - ATTENDING COMMENTS
On tube feeds with continued dispo planning  Still awaiting placement  Continue the rest of the work up and management as stated above.

## 2021-11-07 PROCEDURE — 99231 SBSQ HOSP IP/OBS SF/LOW 25: CPT | Mod: GC

## 2021-11-07 RX ADMIN — Medication 0.5 MILLIGRAM(S): at 05:58

## 2021-11-07 RX ADMIN — Medication 0.5 MILLIGRAM(S): at 17:26

## 2021-11-07 RX ADMIN — ENOXAPARIN SODIUM 40 MILLIGRAM(S): 100 INJECTION SUBCUTANEOUS at 11:44

## 2021-11-07 RX ADMIN — Medication 1 DROP(S): at 17:26

## 2021-11-07 RX ADMIN — LEVETIRACETAM 500 MILLIGRAM(S): 250 TABLET, FILM COATED ORAL at 17:27

## 2021-11-07 RX ADMIN — HALOPERIDOL DECANOATE 0.5 MILLIGRAM(S): 100 INJECTION INTRAMUSCULAR at 08:59

## 2021-11-07 RX ADMIN — Medication 1 DROP(S): at 05:58

## 2021-11-07 RX ADMIN — LEVETIRACETAM 500 MILLIGRAM(S): 250 TABLET, FILM COATED ORAL at 05:58

## 2021-11-07 RX ADMIN — HALOPERIDOL DECANOATE 1 MILLIGRAM(S): 100 INJECTION INTRAMUSCULAR at 21:16

## 2021-11-07 RX ADMIN — NYSTATIN CREAM 1 APPLICATION(S): 100000 CREAM TOPICAL at 17:27

## 2021-11-07 RX ADMIN — NYSTATIN CREAM 1 APPLICATION(S): 100000 CREAM TOPICAL at 05:58

## 2021-11-07 NOTE — PROGRESS NOTE ADULT - SUBJECTIVE AND OBJECTIVE BOX
*****************************************  Radha Almonte MD I PGY1  Internal Medicine  Pager NS: [phone]  *****************************************      Patient is a 55y old  Male who presents with a chief complaint of septic shock (06 Nov 2021 10:03)      SUBJECTIVE :      MEDICATIONS  (STANDING):  artificial  tears Solution 1 Drop(s) Both EYES every 12 hours  benztropine 0.5 milliGRAM(s) Oral every 12 hours  enoxaparin Injectable 40 milliGRAM(s) SubCutaneous daily  haloperidol    Concentrate 0.5 milliGRAM(s) Oral <User Schedule>  haloperidol    Concentrate 1 milliGRAM(s) Oral <User Schedule>  levETIRAcetam  Solution 500 milliGRAM(s) Oral two times a day  nystatin Powder 1 Application(s) Topical every 12 hours  polyethylene glycol 3350 17 Gram(s) Oral <User Schedule>    MEDICATIONS  (PRN):  acetaminophen    Suspension .. 650 milliGRAM(s) Oral every 6 hours PRN Temp greater or equal to 38C (100.4F), Mild Pain (1 - 3)  ondansetron Injectable 4 milliGRAM(s) IV Push every 8 hours PRN Nausea and/or Vomiting  polyethylene glycol 3350 17 Gram(s) Oral daily PRN Constipation      CAPILLARY BLOOD GLUCOSE        I&O's Summary    05 Nov 2021 07:01  -  06 Nov 2021 07:00  --------------------------------------------------------  IN: 1100 mL / OUT: 0 mL / NET: 1100 mL    06 Nov 2021 08:01  -  07 Nov 2021 06:38  --------------------------------------------------------  IN: 1350 mL / OUT: 0 mL / NET: 1350 mL        PHYSICAL EXAM:  Vital Signs Last 24 Hrs  T(C): 36.4 (07 Nov 2021 06:19), Max: 36.5 (06 Nov 2021 12:25)  T(F): 97.6 (07 Nov 2021 06:19), Max: 97.7 (06 Nov 2021 12:25)  HR: 88 (07 Nov 2021 06:19) (88 - 94)  BP: 100/60 (07 Nov 2021 06:19) (90/52 - 102/60)  BP(mean): --  RR: 18 (07 Nov 2021 06:19) (18 - 18)  SpO2: 100% (07 Nov 2021 06:19) (100% - 100%)    CONSTITUTIONAL: No acute distress, cachetic  EYES: No conjunctival or scleral injection, non-icteric   RESPIRATORY: no cough, lungs clear to ascultation bilaterally with transmitted upper airway sounds   CARDIOVASCULAR: +S1S2, RRR, no M/G/R; pedal pulses full and symmetric; no lower extremity edema  GASTROINTESTINAL: No palpable masses or tenderness, +BS throughout, no rebound/guarding; no hepatosplenomegaly; gtube and colostomy in place  SKIN: L. heel covered in nystatin powder   NEUROLOGIC: alert, tracks movement, nonverbal    LABS:                      RADIOLOGY & ADDITIONAL TESTS:  Results Reviewed: Y  Imaging Personally Reviewed:  Electrocardiogram Personally Reviewed:    COORDINATION OF CARE:  Care Discussed with Consultants/Other Providers [Y/N]:  Prior or Outpatient Records Reviewed [Y/N]:   *****************************************  Radha Almonte MD I PGY1  Internal Medicine  Pager NS: [phone]  *****************************************      Patient is a 55y old  Male who presents with a chief complaint of septic shock (06 Nov 2021 10:03)      SUBJECTIVE : NAEO. Pt seen and examined at bedside. Unable to obtain ROS.       MEDICATIONS  (STANDING):  artificial  tears Solution 1 Drop(s) Both EYES every 12 hours  benztropine 0.5 milliGRAM(s) Oral every 12 hours  enoxaparin Injectable 40 milliGRAM(s) SubCutaneous daily  haloperidol    Concentrate 0.5 milliGRAM(s) Oral <User Schedule>  haloperidol    Concentrate 1 milliGRAM(s) Oral <User Schedule>  levETIRAcetam  Solution 500 milliGRAM(s) Oral two times a day  nystatin Powder 1 Application(s) Topical every 12 hours  polyethylene glycol 3350 17 Gram(s) Oral <User Schedule>    MEDICATIONS  (PRN):  acetaminophen    Suspension .. 650 milliGRAM(s) Oral every 6 hours PRN Temp greater or equal to 38C (100.4F), Mild Pain (1 - 3)  ondansetron Injectable 4 milliGRAM(s) IV Push every 8 hours PRN Nausea and/or Vomiting  polyethylene glycol 3350 17 Gram(s) Oral daily PRN Constipation      CAPILLARY BLOOD GLUCOSE        I&O's Summary    05 Nov 2021 07:01  -  06 Nov 2021 07:00  --------------------------------------------------------  IN: 1100 mL / OUT: 0 mL / NET: 1100 mL    06 Nov 2021 08:01  -  07 Nov 2021 06:38  --------------------------------------------------------  IN: 1350 mL / OUT: 0 mL / NET: 1350 mL        PHYSICAL EXAM:  Vital Signs Last 24 Hrs  T(C): 36.4 (07 Nov 2021 06:19), Max: 36.5 (06 Nov 2021 12:25)  T(F): 97.6 (07 Nov 2021 06:19), Max: 97.7 (06 Nov 2021 12:25)  HR: 88 (07 Nov 2021 06:19) (88 - 94)  BP: 100/60 (07 Nov 2021 06:19) (90/52 - 102/60)  BP(mean): --  RR: 18 (07 Nov 2021 06:19) (18 - 18)  SpO2: 100% (07 Nov 2021 06:19) (100% - 100%)    CONSTITUTIONAL: No acute distress, cachetic  EYES: No conjunctival or scleral injection, non-icteric   RESPIRATORY: no cough, lungs clear to ascultation bilaterally with transmitted upper airway sounds   CARDIOVASCULAR: +S1S2, RRR, no M/G/R; pedal pulses full and symmetric; no lower extremity edema  GASTROINTESTINAL: No palpable masses or tenderness, +BS throughout, no rebound/guarding; no hepatosplenomegaly; gtube and colostomy in place  SKIN: L. heel covered in nystatin powder   NEUROLOGIC: alert, tracks movement, nonverbal    LABS:                      RADIOLOGY & ADDITIONAL TESTS:  Results Reviewed: Y  Imaging Personally Reviewed:  Electrocardiogram Personally Reviewed:    COORDINATION OF CARE:  Care Discussed with Consultants/Other Providers [Y/N]:  Prior or Outpatient Records Reviewed [Y/N]:

## 2021-11-08 PROCEDURE — 99231 SBSQ HOSP IP/OBS SF/LOW 25: CPT | Mod: GC

## 2021-11-08 RX ADMIN — HALOPERIDOL DECANOATE 1 MILLIGRAM(S): 100 INJECTION INTRAMUSCULAR at 21:01

## 2021-11-08 RX ADMIN — POLYETHYLENE GLYCOL 3350 17 GRAM(S): 17 POWDER, FOR SOLUTION ORAL at 17:11

## 2021-11-08 RX ADMIN — LEVETIRACETAM 500 MILLIGRAM(S): 250 TABLET, FILM COATED ORAL at 17:11

## 2021-11-08 RX ADMIN — ENOXAPARIN SODIUM 40 MILLIGRAM(S): 100 INJECTION SUBCUTANEOUS at 11:50

## 2021-11-08 RX ADMIN — Medication 0.5 MILLIGRAM(S): at 17:18

## 2021-11-08 RX ADMIN — Medication 0.5 MILLIGRAM(S): at 05:49

## 2021-11-08 RX ADMIN — NYSTATIN CREAM 1 APPLICATION(S): 100000 CREAM TOPICAL at 17:11

## 2021-11-08 RX ADMIN — Medication 1 DROP(S): at 05:49

## 2021-11-08 RX ADMIN — Medication 1 DROP(S): at 17:11

## 2021-11-08 RX ADMIN — LEVETIRACETAM 500 MILLIGRAM(S): 250 TABLET, FILM COATED ORAL at 05:49

## 2021-11-08 RX ADMIN — NYSTATIN CREAM 1 APPLICATION(S): 100000 CREAM TOPICAL at 05:50

## 2021-11-08 RX ADMIN — HALOPERIDOL DECANOATE 0.5 MILLIGRAM(S): 100 INJECTION INTRAMUSCULAR at 09:24

## 2021-11-09 ENCOUNTER — TRANSCRIPTION ENCOUNTER (OUTPATIENT)
Age: 56
End: 2021-11-09

## 2021-11-09 LAB — SARS-COV-2 RNA SPEC QL NAA+PROBE: SIGNIFICANT CHANGE UP

## 2021-11-09 PROCEDURE — 99231 SBSQ HOSP IP/OBS SF/LOW 25: CPT | Mod: GC

## 2021-11-09 RX ORDER — NYSTATIN CREAM 100000 [USP'U]/G
1 CREAM TOPICAL EVERY 12 HOURS
Refills: 0 | Status: DISCONTINUED | OUTPATIENT
Start: 2021-11-09 | End: 2021-11-10

## 2021-11-09 RX ORDER — LEVETIRACETAM 250 MG/1
5 TABLET, FILM COATED ORAL
Qty: 0 | Refills: 0 | DISCHARGE

## 2021-11-09 RX ORDER — POLYETHYLENE GLYCOL 3350 17 G/17G
1 POWDER, FOR SOLUTION ORAL
Qty: 0 | Refills: 0 | DISCHARGE

## 2021-11-09 RX ORDER — NYSTATIN CREAM 100000 [USP'U]/G
1 CREAM TOPICAL
Qty: 0 | Refills: 0 | DISCHARGE
Start: 2021-11-09

## 2021-11-09 RX ADMIN — HALOPERIDOL DECANOATE 1 MILLIGRAM(S): 100 INJECTION INTRAMUSCULAR at 21:47

## 2021-11-09 RX ADMIN — LEVETIRACETAM 500 MILLIGRAM(S): 250 TABLET, FILM COATED ORAL at 17:06

## 2021-11-09 RX ADMIN — Medication 1 DROP(S): at 17:06

## 2021-11-09 RX ADMIN — NYSTATIN CREAM 1 APPLICATION(S): 100000 CREAM TOPICAL at 06:28

## 2021-11-09 RX ADMIN — Medication 0.5 MILLIGRAM(S): at 17:06

## 2021-11-09 RX ADMIN — Medication 0.5 MILLIGRAM(S): at 06:26

## 2021-11-09 RX ADMIN — HALOPERIDOL DECANOATE 0.5 MILLIGRAM(S): 100 INJECTION INTRAMUSCULAR at 11:35

## 2021-11-09 RX ADMIN — ENOXAPARIN SODIUM 40 MILLIGRAM(S): 100 INJECTION SUBCUTANEOUS at 11:36

## 2021-11-09 RX ADMIN — LEVETIRACETAM 500 MILLIGRAM(S): 250 TABLET, FILM COATED ORAL at 06:26

## 2021-11-09 RX ADMIN — Medication 1 DROP(S): at 06:27

## 2021-11-09 RX ADMIN — NYSTATIN CREAM 1 APPLICATION(S): 100000 CREAM TOPICAL at 17:06

## 2021-11-09 NOTE — DISCHARGE NOTE NURSING/CASE MANAGEMENT/SOCIAL WORK - PATIENT PORTAL LINK FT
You can access the FollowMyHealth Patient Portal offered by [facility] by registering at the following website: [email or web address]. By joining [facility]’s FollowMyHealth portal, you will also be able to view your health information using other applications (apps) compatible with our system.

## 2021-11-09 NOTE — PROGRESS NOTE ADULT - SUBJECTIVE AND OBJECTIVE BOX
*****************************************  Radha Almonte MD I PGY1  Internal Medicine  Pager NS: [phone]  *****************************************      Patient is a 55y old  Male who presents with a chief complaint of septic shock (08 Nov 2021 06:32)      SUBJECTIVE :      MEDICATIONS  (STANDING):  artificial  tears Solution 1 Drop(s) Both EYES every 12 hours  benztropine 0.5 milliGRAM(s) Oral every 12 hours  enoxaparin Injectable 40 milliGRAM(s) SubCutaneous daily  haloperidol    Concentrate 0.5 milliGRAM(s) Oral <User Schedule>  haloperidol    Concentrate 1 milliGRAM(s) Oral <User Schedule>  levETIRAcetam  Solution 500 milliGRAM(s) Oral two times a day  nystatin Powder 1 Application(s) Topical every 12 hours  polyethylene glycol 3350 17 Gram(s) Oral <User Schedule>    MEDICATIONS  (PRN):  acetaminophen    Suspension .. 650 milliGRAM(s) Oral every 6 hours PRN Temp greater or equal to 38C (100.4F), Mild Pain (1 - 3)  ondansetron Injectable 4 milliGRAM(s) IV Push every 8 hours PRN Nausea and/or Vomiting  polyethylene glycol 3350 17 Gram(s) Oral daily PRN Constipation      CAPILLARY BLOOD GLUCOSE        I&O's Summary    07 Nov 2021 07:01  -  08 Nov 2021 07:00  --------------------------------------------------------  IN: 1350 mL / OUT: 0 mL / NET: 1350 mL    08 Nov 2021 07:01  -  09 Nov 2021 06:38  --------------------------------------------------------  IN: 1350 mL / OUT: 600 mL / NET: 750 mL        PHYSICAL EXAM:  Vital Signs Last 24 Hrs  T(C): 37.1 (09 Nov 2021 04:54), Max: 37.1 (08 Nov 2021 21:09)  T(F): 98.7 (09 Nov 2021 04:54), Max: 98.7 (08 Nov 2021 21:09)  HR: 106 (09 Nov 2021 04:54) (88 - 106)  BP: 92/63 (09 Nov 2021 04:54) (91/58 - 96/66)  BP(mean): --  RR: 18 (09 Nov 2021 04:54) (17 - 18)  SpO2: 97% (09 Nov 2021 04:54) (95% - 98%)    CONSTITUTIONAL: No acute distress, cachetic  EYES: No conjunctival or scleral injection, non-icteric   RESPIRATORY: no cough, lungs clear to ascultation bilaterally with transmitted upper airway sounds   CARDIOVASCULAR: +S1S2, RRR, no M/G/R; pedal pulses full and symmetric; no lower extremity edema  GASTROINTESTINAL: No palpable masses or tenderness, +BS throughout, no rebound/guarding; no hepatosplenomegaly; gtube and colostomy in place  SKIN: no rashes or lesions noted   NEUROLOGIC: alert, tracks movement, nonverbal    LABS:                      RADIOLOGY & ADDITIONAL TESTS:  Results Reviewed: Y  Imaging Personally Reviewed:  Electrocardiogram Personally Reviewed:    COORDINATION OF CARE:  Care Discussed with Consultants/Other Providers [Y/N]:  Prior or Outpatient Records Reviewed [Y/N]:   *****************************************  Radha Almonte MD I PGY1  Internal Medicine  Pager NS: [phone]  *****************************************      Patient is a 55y old  Male who presents with a chief complaint of septic shock (08 Nov 2021 06:32)      SUBJECTIVE : NAEO. Pt seen and examined at bedside. Unable to obtain ROS; tracks movements.       MEDICATIONS  (STANDING):  artificial  tears Solution 1 Drop(s) Both EYES every 12 hours  benztropine 0.5 milliGRAM(s) Oral every 12 hours  enoxaparin Injectable 40 milliGRAM(s) SubCutaneous daily  haloperidol    Concentrate 0.5 milliGRAM(s) Oral <User Schedule>  haloperidol    Concentrate 1 milliGRAM(s) Oral <User Schedule>  levETIRAcetam  Solution 500 milliGRAM(s) Oral two times a day  nystatin Powder 1 Application(s) Topical every 12 hours  polyethylene glycol 3350 17 Gram(s) Oral <User Schedule>    MEDICATIONS  (PRN):  acetaminophen    Suspension .. 650 milliGRAM(s) Oral every 6 hours PRN Temp greater or equal to 38C (100.4F), Mild Pain (1 - 3)  ondansetron Injectable 4 milliGRAM(s) IV Push every 8 hours PRN Nausea and/or Vomiting  polyethylene glycol 3350 17 Gram(s) Oral daily PRN Constipation      CAPILLARY BLOOD GLUCOSE        I&O's Summary    07 Nov 2021 07:01  -  08 Nov 2021 07:00  --------------------------------------------------------  IN: 1350 mL / OUT: 0 mL / NET: 1350 mL    08 Nov 2021 07:01  -  09 Nov 2021 06:38  --------------------------------------------------------  IN: 1350 mL / OUT: 600 mL / NET: 750 mL        PHYSICAL EXAM:  Vital Signs Last 24 Hrs  T(C): 37.1 (09 Nov 2021 04:54), Max: 37.1 (08 Nov 2021 21:09)  T(F): 98.7 (09 Nov 2021 04:54), Max: 98.7 (08 Nov 2021 21:09)  HR: 106 (09 Nov 2021 04:54) (88 - 106)  BP: 92/63 (09 Nov 2021 04:54) (91/58 - 96/66)  BP(mean): --  RR: 18 (09 Nov 2021 04:54) (17 - 18)  SpO2: 97% (09 Nov 2021 04:54) (95% - 98%)    CONSTITUTIONAL: No acute distress, cachetic  EYES: No conjunctival or scleral injection, non-icteric   RESPIRATORY: no cough, lungs clear to ascultation bilaterally with transmitted upper airway sounds   CARDIOVASCULAR: +S1S2, RRR, no M/G/R; pedal pulses full and symmetric; no lower extremity edema  GASTROINTESTINAL: No palpable masses or tenderness, +BS throughout, no rebound/guarding; no hepatosplenomegaly; gtube and colostomy in place  SKIN: no rashes or lesions noted   NEUROLOGIC: alert, tracks movement, nonverbal    LABS:                      RADIOLOGY & ADDITIONAL TESTS:  Results Reviewed: Y  Imaging Personally Reviewed:  Electrocardiogram Personally Reviewed:    COORDINATION OF CARE:  Care Discussed with Consultants/Other Providers [Y/N]:  Prior or Outpatient Records Reviewed [Y/N]:

## 2021-11-09 NOTE — DISCHARGE NOTE NURSING/CASE MANAGEMENT/SOCIAL WORK - NSDCVIVACCINE_GEN_ALL_CORE_FT
Tdap; 01-Mar-2016 19:24; Sandra Batista (RN); [employer]; [id number]; IntraMuscular; Deltoid Right.; 0.5 milliLiter(s); VIS (VIS Published: 09-May-2013, VIS Presented: 01-Mar-2016);

## 2021-11-10 VITALS
RESPIRATION RATE: 18 BRPM | DIASTOLIC BLOOD PRESSURE: 58 MMHG | OXYGEN SATURATION: 98 % | HEART RATE: 89 BPM | TEMPERATURE: 97 F | SYSTOLIC BLOOD PRESSURE: 94 MMHG

## 2021-11-10 PROCEDURE — 99239 HOSP IP/OBS DSCHRG MGMT >30: CPT | Mod: GC

## 2021-11-10 RX ORDER — ERYTHROMYCIN BASE 5 MG/GRAM
1 OINTMENT (GRAM) OPHTHALMIC (EYE) EVERY 6 HOURS
Refills: 0 | Status: DISCONTINUED | OUTPATIENT
Start: 2021-11-10 | End: 2021-11-10

## 2021-11-10 RX ORDER — ERYTHROMYCIN BASE 5 MG/GRAM
1 OINTMENT (GRAM) OPHTHALMIC (EYE)
Qty: 0 | Refills: 0 | DISCHARGE
Start: 2021-11-10

## 2021-11-10 RX ADMIN — Medication 1 DROP(S): at 06:06

## 2021-11-10 RX ADMIN — Medication 1 APPLICATION(S): at 11:58

## 2021-11-10 RX ADMIN — NYSTATIN CREAM 1 APPLICATION(S): 100000 CREAM TOPICAL at 06:07

## 2021-11-10 RX ADMIN — HALOPERIDOL DECANOATE 0.5 MILLIGRAM(S): 100 INJECTION INTRAMUSCULAR at 09:32

## 2021-11-10 RX ADMIN — LEVETIRACETAM 500 MILLIGRAM(S): 250 TABLET, FILM COATED ORAL at 06:06

## 2021-11-10 RX ADMIN — ENOXAPARIN SODIUM 40 MILLIGRAM(S): 100 INJECTION SUBCUTANEOUS at 11:57

## 2021-11-10 RX ADMIN — Medication 0.5 MILLIGRAM(S): at 06:06

## 2021-11-10 NOTE — PROGRESS NOTE ADULT - NUTRITIONAL ASSESSMENT
This patient has been assessed with a concern for Malnutrition and has been determined to have a diagnosis/diagnoses of Severe protein-calorie malnutrition.    This patient is being managed with:   Diet NPO with Tube Feed-  Tube Feeding Modality: Gastrostomy  Jevity 1.2 Tacho (JEVITY1.2RTH)  Total Volume for 24 Hours (mL): 1200  Bolus  Total Volume of Bolus (mL):  300  Total # of Feeds: 4  Tube Feed Frequency: Every 6 hours   Tube Feed Start Time: 11:00  Bolus Feed Rate (mL per Hour): 300   Bolus Feed Duration (in Hours): 1  Entered: Oct 26 2021 11:48AM    
This patient has been assessed with a concern for Malnutrition and has been determined to have a diagnosis/diagnoses of Severe protein-calorie malnutrition.    This patient is being managed with:   Diet NPO with Tube Feed-  Tube Feeding Modality: Gastrostomy  Jevity 1.2 Tacho (JEVITY1.2RTH)  Total Volume for 24 Hours (mL): 1320  Continuous  Starting Tube Feed Rate {mL per Hour}: 30  Increase Tube Feed Rate by (mL): 10     Every 4 hours  Until Goal Tube Feed Rate (mL per Hour): 55  Tube Feed Duration (in Hours): 24  Tube Feed Start Time: 09:00  Entered: Oct 17 2021  8:17AM    
This patient has been assessed with a concern for Malnutrition and has been determined to have a diagnosis/diagnoses of Severe protein-calorie malnutrition.    This patient is being managed with:   Diet NPO with Tube Feed-  Tube Feeding Modality: Gastrostomy  Jevity 1.2 Tacho (JEVITY1.2RTH)  Total Volume for 24 Hours (mL): 600  Bolus  Total Volume of Bolus (mL):  150  Total # of Feeds: 4  Tube Feed Frequency: Every 6 hours   Tube Feed Start Time: 11:00  Bolus Feed Rate (mL per Hour): 150   Bolus Feed Duration (in Hours): 1  Entered: Oct 25 2021  1:46PM    
This patient has been assessed with a concern for Malnutrition and has been determined to have a diagnosis/diagnoses of Severe protein-calorie malnutrition.    This patient is being managed with:   Diet NPO-  Except Medications  Entered: Oct 24 2021  7:02AM    
This patient has been assessed with a concern for Malnutrition and has been determined to have a diagnosis/diagnoses of Severe protein-calorie malnutrition.    This patient is being managed with:   Diet NPO with Tube Feed-  Tube Feeding Modality: Gastrostomy  Jevity 1.2 Tacho (JEVITY1.2RTH)  Total Volume for 24 Hours (mL): 1200  Bolus  Total Volume of Bolus (mL):  300  Total # of Feeds: 4  Tube Feed Frequency: Every 6 hours   Tube Feed Start Time: 11:00  Bolus Feed Rate (mL per Hour): 300   Bolus Feed Duration (in Hours): 1  Entered: Oct 26 2021 11:48AM    
This patient has been assessed with a concern for Malnutrition and has been determined to have a diagnosis/diagnoses of Severe protein-calorie malnutrition.    This patient is being managed with:   Diet NPO with Tube Feed-  Tube Feeding Modality: Gastrostomy  Jevity 1.2 Tacho (JEVITY1.2RTH)  Total Volume for 24 Hours (mL): 1320  Continuous  Starting Tube Feed Rate {mL per Hour}: 30  Increase Tube Feed Rate by (mL): 10     Every 4 hours  Until Goal Tube Feed Rate (mL per Hour): 55  Tube Feed Duration (in Hours): 24  Tube Feed Start Time: 09:00  Entered: Oct 17 2021  8:17AM    
This patient has been assessed with a concern for Malnutrition and has been determined to have a diagnosis/diagnoses of Severe protein-calorie malnutrition.    This patient is being managed with:   Diet NPO with Tube Feed-  Tube Feeding Modality: Gastrostomy  Jevity 1.2 Tacho (JEVITY1.2RTH)  Total Volume for 24 Hours (mL): 1200  Bolus  Total Volume of Bolus (mL):  300  Total # of Feeds: 4  Tube Feed Frequency: Every 6 hours   Tube Feed Start Time: 11:00  Bolus Feed Rate (mL per Hour): 300   Bolus Feed Duration (in Hours): 1  Entered: Oct 26 2021 11:48AM    
This patient has been assessed with a concern for Malnutrition and has been determined to have a diagnosis/diagnoses of Severe protein-calorie malnutrition.    This patient is being managed with:   Diet NPO with Tube Feed-  Tube Feeding Modality: Gastrostomy  Jevity 1.2 Tacho (JEVITY1.2RTH)  Total Volume for 24 Hours (mL): 1200  Bolus  Total Volume of Bolus (mL):  300  Total # of Feeds: 4  Tube Feed Frequency: Every 6 hours   Tube Feed Start Time: 11:00  Bolus Feed Rate (mL per Hour): 300   Bolus Feed Duration (in Hours): 1  Entered: Oct 22 2021 10:22AM    
This patient has been assessed with a concern for Malnutrition and has been determined to have a diagnosis/diagnoses of Severe protein-calorie malnutrition.    This patient is being managed with:   Diet NPO with Tube Feed-  Tube Feeding Modality: Gastrostomy  Jevity 1.2 Tacho (JEVITY1.2RTH)  Total Volume for 24 Hours (mL): 1320  Continuous  Starting Tube Feed Rate {mL per Hour}: 30  Increase Tube Feed Rate by (mL): 10     Every 4 hours  Until Goal Tube Feed Rate (mL per Hour): 55  Tube Feed Duration (in Hours): 24  Tube Feed Start Time: 09:00  Entered: Oct 17 2021  8:17AM    
This patient has been assessed with a concern for Malnutrition and has been determined to have a diagnosis/diagnoses of Severe protein-calorie malnutrition.    This patient is being managed with:   Diet NPO with Tube Feed-  Tube Feeding Modality: Gastrostomy  Jevity 1.2 Tacho (JEVITY1.2RTH)  Total Volume for 24 Hours (mL): 720  Continuous  Starting Tube Feed Rate {mL per Hour}: 10  Increase Tube Feed Rate by (mL): 10     Every 4 hours  Until Goal Tube Feed Rate (mL per Hour): 30  Tube Feed Duration (in Hours): 24  Tube Feed Start Time: 12:00  Entered: Oct 15 2021 10:28AM    
This patient has been assessed with a concern for Malnutrition and has been determined to have a diagnosis/diagnoses of Severe protein-calorie malnutrition.    This patient is being managed with:   Diet NPO with Tube Feed-  Tube Feeding Modality: Gastrostomy  Jevity 1.2 Tacho (JEVITY1.2RTH)  Total Volume for 24 Hours (mL): 1320  Continuous  Starting Tube Feed Rate {mL per Hour}: 30  Increase Tube Feed Rate by (mL): 10     Every 4 hours  Until Goal Tube Feed Rate (mL per Hour): 55  Tube Feed Duration (in Hours): 24  Tube Feed Start Time: 09:00  Entered: Oct 17 2021  8:17AM    
This patient has been assessed with a concern for Malnutrition and has been determined to have a diagnosis/diagnoses of Severe protein-calorie malnutrition.    This patient is being managed with:   Diet NPO with Tube Feed-  Tube Feeding Modality: Gastrostomy  Jevity 1.2 Tacho (JEVITY1.2RTH)  Total Volume for 24 Hours (mL): 960  Bolus  Total Volume of Bolus (mL):  240  Total # of Feeds: 4  Tube Feed Frequency: Every 6 hours   Tube Feed Start Time: 13:30  Bolus Feed Rate (mL per Hour): 240   Bolus Feed Duration (in Hours): 1  Entered: Oct 21 2021  1:17PM    
This patient has been assessed with a concern for Malnutrition and has been determined to have a diagnosis/diagnoses of Severe protein-calorie malnutrition.    This patient is being managed with:   Diet NPO with Tube Feed-  Tube Feeding Modality: Gastrostomy  Jevity 1.2 Tacho (JEVITY1.2RTH)  Total Volume for 24 Hours (mL): 1200  Bolus  Total Volume of Bolus (mL):  300  Total # of Feeds: 4  Tube Feed Frequency: Every 6 hours   Tube Feed Start Time: 11:00  Bolus Feed Rate (mL per Hour): 300   Bolus Feed Duration (in Hours): 1  Entered: Oct 26 2021 11:48AM    
This patient has been assessed with a concern for Malnutrition and has been determined to have a diagnosis/diagnoses of Severe protein-calorie malnutrition.    This patient is being managed with:   Diet NPO with Tube Feed-  Tube Feeding Modality: Gastrostomy  Jevity 1.2 Tacho (JEVITY1.2RTH)  Total Volume for 24 Hours (mL): 1200  Bolus  Total Volume of Bolus (mL):  300  Total # of Feeds: 4  Tube Feed Frequency: Every 6 hours   Tube Feed Start Time: 11:00  Bolus Feed Rate (mL per Hour): 300   Bolus Feed Duration (in Hours): 1  Entered: Oct 26 2021 11:48AM    
This patient has been assessed with a concern for Malnutrition and has been determined to have a diagnosis/diagnoses of Severe protein-calorie malnutrition.    This patient is being managed with:   Diet NPO with Tube Feed-  Tube Feeding Modality: Gastrostomy  Jevity 1.2 Tacho (JEVITY1.2RTH)  Total Volume for 24 Hours (mL): 1320  Continuous  Starting Tube Feed Rate {mL per Hour}: 30  Increase Tube Feed Rate by (mL): 10     Every 4 hours  Until Goal Tube Feed Rate (mL per Hour): 55  Tube Feed Duration (in Hours): 24  Tube Feed Start Time: 09:00  Entered: Oct 17 2021  8:17AM    
This patient has been assessed with a concern for Malnutrition and has been determined to have a diagnosis/diagnoses of Severe protein-calorie malnutrition.    This patient is being managed with:   Diet NPO with Tube Feed-  Tube Feeding Modality: Gastrostomy  Jevity 1.2 Tacho (JEVITY1.2RTH)  Total Volume for 24 Hours (mL): 1200  Bolus  Total Volume of Bolus (mL):  300  Total # of Feeds: 4  Tube Feed Frequency: Every 6 hours   Tube Feed Start Time: 11:00  Bolus Feed Rate (mL per Hour): 300   Bolus Feed Duration (in Hours): 1  Entered: Oct 26 2021 11:48AM    
This patient has been assessed with a concern for Malnutrition and has been determined to have a diagnosis/diagnoses of Severe protein-calorie malnutrition.    This patient is being managed with:   Diet NPO-  Except Medications  Entered: Oct 24 2021  7:02AM

## 2021-11-10 NOTE — PROGRESS NOTE ADULT - PROBLEM SELECTOR PROBLEM 9
Preventive measure

## 2021-11-10 NOTE — PROGRESS NOTE ADULT - PROBLEM SELECTOR PROBLEM 6
History of extrapyramidal symptoms
Constipation
History of extrapyramidal symptoms

## 2021-11-10 NOTE — PROGRESS NOTE ADULT - PROBLEM/PLAN-3
DISPLAY PLAN FREE TEXT
Fair
DISPLAY PLAN FREE TEXT

## 2021-11-10 NOTE — PROGRESS NOTE ADULT - SUBJECTIVE AND OBJECTIVE BOX
*****************************************  Radha Almonte MD I PGY1  Internal Medicine  Pager NS: [phone]  *****************************************      Patient is a 55y old  Male who presents with a chief complaint of septic shock (09 Nov 2021 06:37)      SUBJECTIVE :      MEDICATIONS  (STANDING):  artificial  tears Solution 1 Drop(s) Both EYES every 12 hours  benztropine 0.5 milliGRAM(s) Oral every 12 hours  enoxaparin Injectable 40 milliGRAM(s) SubCutaneous daily  haloperidol    Concentrate 0.5 milliGRAM(s) Oral <User Schedule>  haloperidol    Concentrate 1 milliGRAM(s) Oral <User Schedule>  levETIRAcetam  Solution 500 milliGRAM(s) Oral two times a day  nystatin Powder 1 Application(s) Topical every 12 hours  polyethylene glycol 3350 17 Gram(s) Oral <User Schedule>    MEDICATIONS  (PRN):  acetaminophen    Suspension .. 650 milliGRAM(s) Oral every 6 hours PRN Temp greater or equal to 38C (100.4F), Mild Pain (1 - 3)  ondansetron Injectable 4 milliGRAM(s) IV Push every 8 hours PRN Nausea and/or Vomiting  polyethylene glycol 3350 17 Gram(s) Oral daily PRN Constipation      CAPILLARY BLOOD GLUCOSE        I&O's Summary    08 Nov 2021 07:01  -  09 Nov 2021 07:00  --------------------------------------------------------  IN: 1350 mL / OUT: 600 mL / NET: 750 mL    09 Nov 2021 07:01  -  10 Nov 2021 06:40  --------------------------------------------------------  IN: 800 mL / OUT: 700 mL / NET: 100 mL        PHYSICAL EXAM:  Vital Signs Last 24 Hrs  T(C): 36.3 (10 Nov 2021 06:01), Max: 36.6 (09 Nov 2021 13:01)  T(F): 97.3 (10 Nov 2021 06:01), Max: 97.9 (09 Nov 2021 21:55)  HR: 81 (10 Nov 2021 06:01) (81 - 98)  BP: 93/63 (10 Nov 2021 06:01) (92/53 - 100/51)  BP(mean): --  RR: 18 (10 Nov 2021 06:01) (18 - 18)  SpO2: 93% (10 Nov 2021 06:01) (93% - 98%)    CONSTITUTIONAL: No acute distress, cachetic  EYES: No conjunctival or scleral injection, non-icteric   RESPIRATORY: no cough, lungs clear to ascultation bilaterally with transmitted upper airway sounds   CARDIOVASCULAR: +S1S2, RRR, no M/G/R; pedal pulses full and symmetric; no lower extremity edema  GASTROINTESTINAL: No palpable masses or tenderness, +BS throughout, no rebound/guarding; no hepatosplenomegaly; gtube and colostomy in place  SKIN: no rashes or lesions noted   NEUROLOGIC: alert, tracks movement, nonverbal    LABS:                      RADIOLOGY & ADDITIONAL TESTS:  Results Reviewed: Y  Imaging Personally Reviewed:  Electrocardiogram Personally Reviewed:    COORDINATION OF CARE:  Care Discussed with Consultants/Other Providers [Y/N]:  Prior or Outpatient Records Reviewed [Y/N]:   *****************************************  Radha Almonte MD I PGY1  Internal Medicine  Pager NS: [phone]  *****************************************      Patient is a 55y old  Male who presents with a chief complaint of septic shock (09 Nov 2021 06:37)      SUBJECTIVE : NAEO. Pt seen and examined at bedside. Unable to obtain ROS. Tracks movements.       MEDICATIONS  (STANDING):  artificial  tears Solution 1 Drop(s) Both EYES every 12 hours  benztropine 0.5 milliGRAM(s) Oral every 12 hours  enoxaparin Injectable 40 milliGRAM(s) SubCutaneous daily  haloperidol    Concentrate 0.5 milliGRAM(s) Oral <User Schedule>  haloperidol    Concentrate 1 milliGRAM(s) Oral <User Schedule>  levETIRAcetam  Solution 500 milliGRAM(s) Oral two times a day  nystatin Powder 1 Application(s) Topical every 12 hours  polyethylene glycol 3350 17 Gram(s) Oral <User Schedule>    MEDICATIONS  (PRN):  acetaminophen    Suspension .. 650 milliGRAM(s) Oral every 6 hours PRN Temp greater or equal to 38C (100.4F), Mild Pain (1 - 3)  ondansetron Injectable 4 milliGRAM(s) IV Push every 8 hours PRN Nausea and/or Vomiting  polyethylene glycol 3350 17 Gram(s) Oral daily PRN Constipation      CAPILLARY BLOOD GLUCOSE        I&O's Summary    08 Nov 2021 07:01  -  09 Nov 2021 07:00  --------------------------------------------------------  IN: 1350 mL / OUT: 600 mL / NET: 750 mL    09 Nov 2021 07:01  -  10 Nov 2021 06:40  --------------------------------------------------------  IN: 800 mL / OUT: 700 mL / NET: 100 mL        PHYSICAL EXAM:  Vital Signs Last 24 Hrs  T(C): 36.3 (10 Nov 2021 06:01), Max: 36.6 (09 Nov 2021 13:01)  T(F): 97.3 (10 Nov 2021 06:01), Max: 97.9 (09 Nov 2021 21:55)  HR: 81 (10 Nov 2021 06:01) (81 - 98)  BP: 93/63 (10 Nov 2021 06:01) (92/53 - 100/51)  BP(mean): --  RR: 18 (10 Nov 2021 06:01) (18 - 18)  SpO2: 93% (10 Nov 2021 06:01) (93% - 98%)    CONSTITUTIONAL: No acute distress, cachetic  EYES: No conjunctival or scleral injection, non-icteric; + R eye crusting noted; not purulent   RESPIRATORY: no cough, lungs clear to ascultation bilaterally with transmitted upper airway sounds   CARDIOVASCULAR: +S1S2, RRR, no M/G/R; pedal pulses full and symmetric; no lower extremity edema  GASTROINTESTINAL: No palpable masses or tenderness, +BS throughout, no rebound/guarding; no hepatosplenomegaly; gtube and colostomy in place  SKIN: no rashes or lesions noted   NEUROLOGIC: alert, tracks movement, nonverbal    LABS:                      RADIOLOGY & ADDITIONAL TESTS:  Results Reviewed: Y  Imaging Personally Reviewed:  Electrocardiogram Personally Reviewed:    COORDINATION OF CARE:  Care Discussed with Consultants/Other Providers [Y/N]:  Prior or Outpatient Records Reviewed [Y/N]:

## 2021-11-10 NOTE — CHART NOTE - NSCHARTNOTESELECT_GEN_ALL_CORE
Nutrition Follow Up/Nutrition Services
Event Note
Event Note
Nutrition Follow Up/Nutrition Services
Nutrition Services

## 2021-11-10 NOTE — PROGRESS NOTE ADULT - PROBLEM SELECTOR PLAN 6
- continue home miralax (2x/week Monday and Thursday at 7pm) thru G tube
- continue home benztropine thru G tube

## 2021-11-10 NOTE — PROGRESS NOTE ADULT - PROVIDER SPECIALTY LIST ADULT
Internal Medicine
Palliative Care
Internal Medicine

## 2021-11-10 NOTE — PROGRESS NOTE ADULT - ATTENDING SUPERVISION STATEMENT
Resident

## 2021-11-10 NOTE — CHART NOTE - NSCHARTNOTEFT_GEN_A_CORE
Source: Patient [ ]    Family [ ]     other [x ] RN, chart review    Patient seen in f/u for severe malnutrition. 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    Patient currently receiving bolus Jevity 1.2 via GT @ 300 mL every 6 hrs. (4x daily total). Provides 1,440 kcal (28.2 kcal/kg), 67 g pro (1.3 g/kg) based on ABW 51 kg (10/29) and 968 mL of fluid (TF free water). Total volume is 1,200 mL. RN reports no intolerances to tube feed provision. No edema or pressure injuries noted per RN flow sheet.    Diet : Diet, NPO with Tube Feed:   Tube Feeding Modality: Gastrostomy  Jevity 1.2 Tacho (JEVITY1.2RTH)  Total Volume for 24 Hours (mL): 1200  Bolus  Total Volume of Bolus (mL):  300  Total # of Feeds: 4  Tube Feed Frequency: Every 6 hours   Tube Feed Start Time: 11:00  Bolus Feed Rate (mL per Hour): 300   Bolus Feed Duration (in Hours): 1 (10-26-21 @ 11:49)    Current Weight: no new weights to assess    Pertinent Medications: MEDICATIONS  (STANDING):  artificial  tears Solution 1 Drop(s) Both EYES every 12 hours  benztropine 0.5 milliGRAM(s) Oral every 12 hours  enoxaparin Injectable 40 milliGRAM(s) SubCutaneous daily  erythromycin   Ointment 1 Application(s) Right EYE every 6 hours  haloperidol    Concentrate 1 milliGRAM(s) Oral <User Schedule>  haloperidol    Concentrate 0.5 milliGRAM(s) Oral <User Schedule>  levETIRAcetam  Solution 500 milliGRAM(s) Oral two times a day  nystatin Powder 1 Application(s) Topical every 12 hours  polyethylene glycol 3350 17 Gram(s) Oral <User Schedule>    MEDICATIONS  (PRN):  acetaminophen    Suspension .. 650 milliGRAM(s) Oral every 6 hours PRN Temp greater or equal to 38C (100.4F), Mild Pain (1 - 3)  ondansetron Injectable 4 milliGRAM(s) IV Push every 8 hours PRN Nausea and/or Vomiting  polyethylene glycol 3350 17 Gram(s) Oral daily PRN Constipation    Pertinent Labs: no new labs to assess    Estimated Needs:   [x ] no change since previous assessment  [ ] recalculated:     Previous Nutrition Diagnosis: Severe malnutrition (acute)    Nutrition Diagnosis is [x ] ongoing  [ ] resolved [ ] not applicable     Interventions:   - managed with Jevity 1.2 tube feeds via GT    Recommend  - continue tube feed provision as ordered  - obtain current weight as well as weekly weight to monitor trend     Monitoring and Evaluation:   [x ] Tolerance to diet prescription [x ] weights [x ] follow up per protocol

## 2021-11-10 NOTE — PROGRESS NOTE ADULT - PROBLEM SELECTOR PROBLEM 8
Restlessness and agitation
Preventive measure
Restlessness and agitation

## 2021-11-10 NOTE — PROGRESS NOTE ADULT - PROBLEM SELECTOR PROBLEM 7
Constipation
Constipation
Restlessness and agitation
Constipation

## 2021-11-10 NOTE — PROGRESS NOTE ADULT - PROBLEM SELECTOR PROBLEM 5
Urinary retention
History of extrapyramidal symptoms
Urinary retention

## 2021-12-08 NOTE — PATIENT PROFILE ADULT - ANY SIGNIFICANT CHANGE IN ABILITY TO PERFORM THE FOLLOWING ADL SINCE THE ONSET OF PRESENT ILLNESS?
Thank you for choosing the [facility], Vibha Leos MD, and our team as your Neurology Specialists.  We actively use feedback to constantly improve and deliver the best care possible. To provide the best experience, we are collecting feedback from you on how we performed.  You may receive a survey in the mail to evaluate how we did. Please take a moment and share your thoughts.      If for any reason you feel that we did not meet your expectations or you want to share a positive experience, please give us a call. Your feedback helps us know how we are doing and what we can be doing better.    Office hours: 8:00 am to 4:30 pm, Monday - Friday  Phone: [phone]     no

## 2022-03-22 NOTE — H&P ADULT - ASSESSMENT
Patient states that his post op stool softener \"is working too well\". Please call patient.   55 y/o M with MR (non verbal at baseline) 2M, MR p/w fever and cough.

## 2022-08-26 NOTE — PHYSICAL THERAPY INITIAL EVALUATION ADULT - NS ASR RISK AREAS PT EVAL
Care manager note     ARCELIA called [place] transfer center and spoke with Pilar, f/u the transfer request. Pt has been accepted, still waiting for bed to be available, she states pending their discharges today, hopefully that bed will be available late afternoon or evening today.ARCELIA confirmed they have correct numbers to call when bed becomes available. RN and Dr Mao was updated.     1615:  ARCELIA f/u again with [facility], still no bed available as of now. But they said they still possible tonight or this weekend.  ARCELIA confirmed they have correct numbers for [address]  to call when bed becomes available. Dr Mao and RN was updated.   impaired judgment/safety awareness

## 2022-10-03 NOTE — BRIEF OPERATIVE NOTE - NSICDXBRIEFPROCEDURE_GEN_ALL_CORE_FT
Procedure   Destruction of Lesion    Date/Time: 10/3/2022 8:42 AM  Performed by: Carol Crockett APRN  Authorized by: Carol Crockett APRN   Preparation: Patient was prepped and draped in the usual sterile fashion.  Local anesthesia used: yes    Anesthesia:  Local anesthesia used: yes  Local Anesthetic: lidocaine 1% with epinephrine  Anesthetic total: 0.75 mL    Sedation:  Patient sedated: no    Patient tolerance: patient tolerated the procedure well with no immediate complications  Comments: Curette for removal of lesion. Hot cautery for hemostasis. Tissue sent for pathology.            
PROCEDURES:  Evacuation of hematoma of abdomen 23-Jan-2021 01:41:34  Sylvia Munroe  
PROCEDURES:  Sigmoid colostomy 18-Jan-2021 16:41:56  [place]o  Open sigmoidectomy 18-Jan-2021 16:41:23  [place]o

## 2023-09-28 NOTE — PROGRESS NOTE ADULT - ATTENDING COMMENTS
no retractions Patient seen and examined. Case discussed with the medical team on rounds. I agree with the findings and the plan above.    Continue tube feeds, and continue dispo planning  Continue the rest of the work up and management as stated above.

## 2023-10-27 NOTE — ED PROVIDER NOTE - NS ED MD EM SELECTION
Recent Visits  Date Type Provider Dept   11/08/22 Office Visit Sofia Lemons MD [facility]   05/09/22 Office Visit Sofia Lemons MD [facility]   Showing recent visits within past 540 days with a meds authorizing provider and meeting all other requirements  Future Appointments  Date Type Provider Dept   11/08/23 Appointment Sofia Lemons MD [facility]   Showing future appointments within next 150 days with a meds authorizing provider and meeting all other requirements     11/8/2022
Will refill at follow up well visit
[id number] Comprehensive

## 2024-02-06 NOTE — H&P ADULT - NSHPPHYSICALEXAM_GEN_ALL_CORE
Addended by: LANCE CASTRO on: 2/6/2024 10:33 AM     Modules accepted: Orders    
Vital Signs Last 24 Hrs  T(C): 36.4 (21 Mar 2020 01:02), Max: 37.1 (20 Mar 2020 14:30)  T(F): 97.5 (21 Mar 2020 01:02), Max: 98.8 (20 Mar 2020 14:30)  HR: 87 (21 Mar 2020 01:02) (87 - 103)  BP: 111/70 (21 Mar 2020 01:02) (111/70 - 122/87)  BP(mean): --  RR: 17 (21 Mar 2020 01:02) (16 - 17)  SpO2: 100% (21 Mar 2020 01:02) (100% - 100%)    PHYSICAL EXAM:  GENERAL: No Acute Distress  EYES: conjunctiva and sclera clear  ENMT: Moist mucous membranes   NECK: Supple  PULMONARY: Clear to auscultation bilaterally  CARDIAC: Regular rate and rhythm; No murmurs, rubs, or gallops  GASTROINTESTINAL: Abdomen soft, Nontender, Nondistended; Bowel sounds normal  EXTREMITIES:   No clubbing, cyanosis, or pedal edema  PSYCH: Nonverbal.  Calm  SKIN: No rashes or lesions  MUSCULOSKELETAL: No joint swelling

## 2025-01-15 NOTE — ED PROVIDER NOTE - PROGRESS NOTE DETAILS
Sandy: Call received from nurse of group home with Tm actually 102F. Will do full infectious workup at this time liver enzymes down trending, ua unremarkable, spoke with [facility] - they are unable to quarantine pt, he will have to be admitted DISCHARGE

## 2025-03-02 NOTE — DIETITIAN INITIAL EVALUATION ADULT. - PHYSICAL ASSESSMENT TEMPLES
Take 6 tablets by mouth daily for 5 days, Disp-30 tablet, R-0Normal      albuterol sulfate HFA (PROVENTIL HFA) 108 (90 Base) MCG/ACT inhaler Inhale 2 puffs into the lungs every 6 hours as needed for Wheezing or Shortness of Breath, Disp-18 g, R-0Normal      guaiFENesin-codeine (GUAIFENESIN AC) 100-10 MG/5ML liquid Take 5 mLs by mouth 3 times daily as needed for Cough for up to 7 days. Max Daily Amount: 15 mLs, Disp-120 mL, R-0Normal      benzonatate (TESSALON) 100 MG capsule Take 1 capsule by mouth 2 times daily as needed for Cough, Disp-20 capsule, R-0Normal             DISCONTINUED MEDICATIONS:  Discharge Medication List as of 3/2/2025  7:37 PM                 (Please note that portions ofthis note were completed with a voice recognition program.  Efforts were made to edit the dictations but occasionally words are mis-transcribed.)    Jazmin Kennedy PA-C (electronically signed)            Jazmin Kennedy PA-C  03/02/25 2043     moderate

## 2025-05-27 NOTE — ED ADULT TRIAGE NOTE - DATE OF LAST VACCINATION
Continue to monitor your blood pressure at home at least twice weekly.  Continue home medicines as previously prescribed.  Try to limit salt, alcohol and tobacco as this affects your blood pressure.  Follow-up with your primary care provider.  
02-Jan-2021
